# Patient Record
Sex: FEMALE | Race: WHITE | Employment: OTHER | URBAN - NONMETROPOLITAN AREA
[De-identification: names, ages, dates, MRNs, and addresses within clinical notes are randomized per-mention and may not be internally consistent; named-entity substitution may affect disease eponyms.]

---

## 2017-02-07 ENCOUNTER — HOSPITAL ENCOUNTER (OUTPATIENT)
Dept: SLEEP CENTER | Age: 62
Discharge: HOME | End: 2017-02-07
Payer: COMMERCIAL

## 2017-02-07 PROCEDURE — 95811 POLYSOM 6/>YRS CPAP 4/> PARM: CPT

## 2017-02-07 PROCEDURE — 95811 POLYSOM 6/>YRS CPAP 4/> PARM: CPT | Performed by: PSYCHIATRY & NEUROLOGY

## 2017-02-11 DIAGNOSIS — G47.33 OBSTRUCTIVE SLEEP APNEA: ICD-10-CM

## 2017-02-11 DIAGNOSIS — R06.02 SHORTNESS OF BREATH: ICD-10-CM

## 2017-02-11 DIAGNOSIS — G47.31 CENTRAL SLEEP APNEA: Primary | ICD-10-CM

## 2017-02-16 ENCOUNTER — TELEPHONE (OUTPATIENT)
Dept: NEUROLOGY | Age: 62
End: 2017-02-16

## 2017-02-22 ENCOUNTER — HOSPITAL ENCOUNTER (OUTPATIENT)
Dept: NON INVASIVE DIAGNOSTICS | Age: 62
Discharge: HOME | End: 2017-02-22
Payer: COMMERCIAL

## 2017-02-22 DIAGNOSIS — R06.02 SHORTNESS OF BREATH: ICD-10-CM

## 2017-02-22 PROCEDURE — 93306 TTE W/DOPPLER COMPLETE: CPT

## 2017-02-24 ENCOUNTER — TELEPHONE (OUTPATIENT)
Dept: NEUROLOGY | Age: 62
End: 2017-02-24

## 2017-04-05 ENCOUNTER — TELEPHONE (OUTPATIENT)
Dept: NEUROLOGY | Age: 62
End: 2017-04-05

## 2017-05-10 ENCOUNTER — TELEPHONE (OUTPATIENT)
Dept: NEUROLOGY | Age: 62
End: 2017-05-10

## 2018-08-14 ENCOUNTER — APPOINTMENT (OUTPATIENT)
Dept: PREADMISSION TESTING | Facility: HOSPITAL | Age: 63
End: 2018-08-14

## 2018-08-16 ENCOUNTER — APPOINTMENT (OUTPATIENT)
Dept: PREADMISSION TESTING | Facility: HOSPITAL | Age: 63
End: 2018-08-16

## 2018-08-16 VITALS
DIASTOLIC BLOOD PRESSURE: 67 MMHG | WEIGHT: 223.99 LBS | HEIGHT: 64 IN | HEART RATE: 78 BPM | SYSTOLIC BLOOD PRESSURE: 134 MMHG | RESPIRATION RATE: 20 BRPM | OXYGEN SATURATION: 94 % | BODY MASS INDEX: 38.24 KG/M2

## 2018-08-16 LAB
ANION GAP SERPL CALCULATED.3IONS-SCNC: 9 MMOL/L (ref 4–13)
BUN BLD-MCNC: 14 MG/DL (ref 5–21)
BUN/CREAT SERPL: 21.5 (ref 7–25)
CALCIUM SPEC-SCNC: 9.2 MG/DL (ref 8.4–10.4)
CHLORIDE SERPL-SCNC: 102 MMOL/L (ref 98–110)
CO2 SERPL-SCNC: 29 MMOL/L (ref 24–31)
CREAT BLD-MCNC: 0.65 MG/DL (ref 0.5–1.4)
DEPRECATED RDW RBC AUTO: 40.2 FL (ref 40–54)
ERYTHROCYTE [DISTWIDTH] IN BLOOD BY AUTOMATED COUNT: 13.3 % (ref 12–15)
GFR SERPL CREATININE-BSD FRML MDRD: 92 ML/MIN/1.73
GLUCOSE BLD-MCNC: 161 MG/DL (ref 70–100)
HCT VFR BLD AUTO: 39 % (ref 37–47)
HGB BLD-MCNC: 12.4 G/DL (ref 12–16)
MCH RBC QN AUTO: 26.4 PG (ref 28–32)
MCHC RBC AUTO-ENTMCNC: 31.8 G/DL (ref 33–36)
MCV RBC AUTO: 83.2 FL (ref 82–98)
PLATELET # BLD AUTO: 234 10*3/MM3 (ref 130–400)
PMV BLD AUTO: 9.2 FL (ref 6–12)
POTASSIUM BLD-SCNC: 3.5 MMOL/L (ref 3.5–5.3)
RBC # BLD AUTO: 4.69 10*6/MM3 (ref 4.2–5.4)
SODIUM BLD-SCNC: 140 MMOL/L (ref 135–145)
WBC NRBC COR # BLD: 8.24 10*3/MM3 (ref 4.8–10.8)

## 2018-08-16 PROCEDURE — 36415 COLL VENOUS BLD VENIPUNCTURE: CPT

## 2018-08-16 PROCEDURE — 93010 ELECTROCARDIOGRAM REPORT: CPT | Performed by: INTERNAL MEDICINE

## 2018-08-16 PROCEDURE — 93005 ELECTROCARDIOGRAM TRACING: CPT

## 2018-08-16 PROCEDURE — 85027 COMPLETE CBC AUTOMATED: CPT | Performed by: PODIATRIST

## 2018-08-16 PROCEDURE — 80048 BASIC METABOLIC PNL TOTAL CA: CPT | Performed by: PODIATRIST

## 2018-08-16 RX ORDER — ASPIRIN 325 MG
325 TABLET ORAL DAILY
COMMUNITY
End: 2021-04-14

## 2018-08-16 RX ORDER — ERGOCALCIFEROL 1.25 MG/1
50000 CAPSULE ORAL WEEKLY
COMMUNITY
End: 2019-09-30

## 2018-08-16 NOTE — DISCHARGE INSTRUCTIONS
DAY OF SURGERY INSTRUCTIONS        YOUR SURGEON: Senthil Curtis    PROCEDURE: 2nd and 3rd Tarsometatarsal Joint Injection with Fluroscopic Guidance Bilateral Feet     DATE OF SURGERY: August 21, 2018     ARRIVAL TIME: AS DIRECTED BY OFFICE    DAY OF SURGERY TAKE ONLY THESE MEDICATIONS UNLESS OTHERWISE INSTRUCTED BY YOUR PHYSICIAN: Metoprolol         MANAGING PAIN AFTER SURGERY    We know you are probably wondering what your pain will be like after surgery.  Following surgery it is unrealistic to expect you will not have pain.   Pain is how our bodies let us know that something is wrong or cautions us to be careful.  That said, our goal is to make your pain tolerable.    Methods we may use to treat your pain include (oral or IV medications, PCAs, epidurals, nerve blocks, etc.)   While some procedures require IV pain medications for a short time after surgery, transitioning to pain medications by mouth allows for better management of pain.   Your nurse will encourage you to take oral pain medications whenever possible.  IV medications work almost immediately, but only last a short while.  Taking medications by mouth allows for a more constant level of medication in your blood stream for a longer period of time.      Once your pain is out of control it is harder to get back under control.  It is important you are aware when your next dose of pain medication is due.  If you are admitted, your nurse may write the time of your next dose on the white board in your room to help you remember.      We are interested in your pain and encourage you to inform us about aggravating factors during your visit.   Many times a simple repositioning every few hours can make a big difference.    If your physician says it is okay, do not let your pain prevent you from getting out of bed. Be sure to call your nurse for assistance prior to getting up so you do not fall.      Before surgery, please decide your tolerable pain goal.  These  faces help describe the pain ratings we use on a 0-10 scale.   Be prepared to tell us your goal and whether or not you take pain or anxiety medications at home.          BEFORE YOU COME TO THE HOSPITAL  (Pre-op instructions)  • Do not eat, drink, smoke or chew gum after midnight the night before surgery.  This also includes no mints.  • Morning of surgery take only the medicines you have been instructed with a sip of water unless otherwise instructed  by your physician.  • Do not shave, wear makeup or dark nail polish.  • Remove all jewelry including rings.  • Leave anything you consider valuable at home.  • Leave your suitcase in the car until after your surgery.  • Bring the following with you if applicable:  o Picture ID and insurance, Medicare or Medicaid cards  o Co-pay/deductible required by insurance (cash, check, credit card)  o Copy of advance directive, living will or power-of- documents if not brought to PAT  o CPAP or BIPAP mask and tubing  o Relaxation aids (MP3 player, book, magazine)  • On the day of surgery check in at registration located at the main entrance of the hospital.       Outpatient Surgery Guidelines, Adult  Outpatient procedures are those for which the person having the procedure is allowed to go home the same day as the procedure. Various procedures are done on an outpatient basis. You should follow some general guidelines if you will be having an outpatient procedure.  LET YOUR HEALTH CARE PROVIDER KNOW ABOUT:  · Any allergies you have.  · All medicines you are taking, including vitamins, herbs, eye drops, creams, and over-the-counter medicines.  · Previous problems you or members of your family have had with the use of anesthetics.  · Any blood disorders you have.  · Previous surgeries you have had.  · Medical conditions you have.  RISKS AND COMPLICATIONS  Your health care provider will discuss possible risks and complications with you before surgery. Common risks and  complications include:    · Problems due to the use of anesthetics.  · Blood loss and replacement (does not apply to minor surgical procedures).  · Temporary increase in pain due to surgery.  · Uncorrected pain or problems that the surgery was meant to correct.  · Infection.  · New damage.  BEFORE THE PROCEDURE  · Ask your health care provider about changing or stopping your regular medicines. You may need to stop taking certain medicines in the days or weeks before the procedure.  · Stop smoking at least 2 weeks before surgery. This lowers your risk for complications during and after surgery. Ask your health care provider for help with this if needed.  · Eat your usual meals and a light supper the day before surgery. Continue fluid intake. Do not drink alcohol.  · Do not eat or drink after midnight the night before your surgery.   · Arrange for someone to take you home and to stay with you for 24 hours after the procedure. Medicine given for your procedure may affect your ability to drive or to care for yourself.  · Call your health care provider's office if you develop an illness or problem that may prevent you from safely having your procedure.  AFTER THE PROCEDURE  After surgery, you will be taken to a recovery area, where your progress will be monitored. If there are no complications, you will be allowed to go home when you are awake, stable, and taking fluids well. You may have numbness around the surgical site. Healing will take some time. You will have tenderness at the surgical site and may have some swelling and bruising. You may also have some nausea.  HOME CARE INSTRUCTIONS  · Do not drive for 24 hours, or as directed by your health care provider. Do not drive while taking prescription pain medicines.  · Do not drink alcohol for 24 hours.  · Do not make important decisions or sign legal documents for 24 hours.  · You may resume a normal diet and activities as directed.  · Do not lift anything heavier  than 10 pounds (4.5 kg) or play contact sports until your health care provider says it is okay.  · Change your bandages (dressings) as directed.  · Only take over-the-counter or prescription medicines as directed by your health care provider.  · Follow up with your health care provider as directed.  SEEK MEDICAL CARE IF:  · You have increased bleeding (more than a small spot) from the surgical site.  · You have redness, swelling, or increasing pain in the wound.  · You see pus coming from the wound.  · You have a fever.  · You notice a bad smell coming from the wound or dressing.  · You feel lightheaded or faint.  · You develop a rash.  · You have trouble breathing.  · You develop allergies.  MAKE SURE YOU:  · Understand these instructions.  · Will watch your condition.  · Will get help right away if you are not doing well or get worse.     This information is not intended to replace advice given to you by your health care provider. Make sure you discuss any questions you have with your health care provider.     Document Released: 09/12/2002 Document Revised: 05/03/2016 Document Reviewed: 05/22/2014  [facility] [facility] Interactive Patient Education ©2016 [employer]       Fall Prevention in Hospitals, Adult  As a hospital patient, your condition and the treatments you receive can increase your risk for falls. Some additional risk factors for falls in a hospital include:  · Being in an unfamiliar environment.  · Being on bed rest.  · Your surgery.  · Taking certain medicines.  · Your tubing requirements, such as intravenous (IV) therapy or catheters.  It is important that you learn how to decrease fall risks while at the hospital. Below are important tips that can help prevent falls.  SAFETY TIPS FOR PREVENTING FALLS  Talk about your risk of falling.  · Ask your health care provider why you are at risk for falling. Is it your medicine, illness, tubing placement, or something else?  · Make a plan with your health care  provider to keep you safe from falls.  · Ask your health care provider or pharmacist about side effects of your medicines. Some medicines can make you dizzy or affect your coordination.  Ask for help.  · Ask for help before getting out of bed. You may need to press your call button.  · Ask for assistance in getting safely to the toilet.  · Ask for a walker or cane to be put at your bedside. Ask that most of the side rails on your bed be placed up before your health care provider leaves the room.  · Ask family or friends to sit with you.  · Ask for things that are out of your reach, such as your glasses, hearing aids, telephone, bedside table, or call button.  Follow these tips to avoid falling:  · Stay lying or seated, rather than standing, while waiting for help.  · Wear rubber-soled slippers or shoes whenever you walk in the hospital.  · Avoid quick, sudden movements.  ¨ Change positions slowly.  ¨ Sit on the side of your bed before standing.  ¨ Stand up slowly and wait before you start to walk.  · Let your health care provider know if there is a spill on the floor.  · Pay careful attention to the medical equipment, electrical cords, and tubes around you.  · When you need help, use your call button by your bed or in the bathroom. Wait for one of your health care providers to help you.  · If you feel dizzy or unsure of your footing, return to bed and wait for assistance.  · Avoid being distracted by the TV, telephone, or another person in your room.  · Do not lean or support yourself on rolling objects, such as IV poles or bedside tables.     This information is not intended to replace advice given to you by your health care provider. Make sure you discuss any questions you have with your health care provider.     Document Released: 12/15/2001 Document Revised: 01/08/2016 Document Reviewed: 08/25/2013  [employer] Interactive Patient Education ©2016 [employer]       Surgical Site Infections FAQs  What is a Surgical  Site Infection (SSI)?  A surgical site infection is an infection that occurs after surgery in the part of the body where the surgery took place. Most patients who have surgery do not develop an infection. However, infections develop in about 1 to 3 out of every 100 patients who have surgery.  Some of the common symptoms of a surgical site infection are:  · Redness and pain around the area where you had surgery  · Drainage of cloudy fluid from your surgical wound  · Fever  Can SSIs be treated?  Yes. Most surgical site infections can be treated with antibiotics. The antibiotic given to you depends on the bacteria (germs) causing the infection. Sometimes patients with SSIs also need another surgery to treat the infection.  What are some of the things that hospitals are doing to prevent SSIs?  To prevent SSIs, doctors, nurses, and other healthcare providers:  · Clean their hands and arms up to their elbows with an antiseptic agent just before the surgery.  · Clean their hands with soap and water or an alcohol-based hand rub before and after caring for each patient.  · May remove some of your hair immediately before your surgery using electric clippers if the hair is in the same area where the procedure will occur. They should not shave you with a razor.  · Wear special hair covers, masks, gowns, and gloves during surgery to keep the surgery area clean.  · Give you antibiotics before your surgery starts. In most cases, you should get antibiotics within 60 minutes before the surgery starts and the antibiotics should be stopped within 24 hours after surgery.  · Clean the skin at the site of your surgery with a special soap that kills germs.  What can I do to help prevent SSIs?  Before your surgery:  · Tell your doctor about other medical problems you may have. Health problems such as allergies, diabetes, and obesity could affect your surgery and your treatment.  · Quit smoking. Patients who smoke get more infections. Talk  to your doctor about how you can quit before your surgery.  · Do not shave near where you will have surgery. Shaving with a razor can irritate your skin and make it easier to develop an infection.  At the time of your surgery:  · Speak up if someone tries to shave you with a razor before surgery. Ask why you need to be shaved and talk with your surgeon if you have any concerns.  · Ask if you will get antibiotics before surgery.  After your surgery:  · Make sure that your healthcare providers clean their hands before examining you, either with soap and water or an alcohol-based hand rub.  · If you do not see your providers clean their hands, please ask them to do so.  · Family and friends who visit you should not touch the surgical wound or dressings.  · Family and friends should clean their hands with soap and water or an alcohol-based hand rub before and after visiting you. If you do not see them clean their hands, ask them to clean their hands.  What do I need to do when I go home from the hospital?  · Before you go home, your doctor or nurse should explain everything you need to know about taking care of your wound. Make sure you understand how to care for your wound before you leave the hospital.  · Always clean your hands before and after caring for your wound.  · Before you go home, make sure you know who to contact if you have questions or problems after you get home.  · If you have any symptoms of an infection, such as redness and pain at the surgery site, drainage, or fever, call your doctor immediately.  If you have additional questions, please ask your doctor or nurse.  Developed and co-sponsored by The Society for Healthcare Epidemiology of [place] (SHEA); Infectious Diseases Society of [place] (IDSA); American Hospital Association; Association for Professionals in Infection Control and Epidemiology (APIC); Centers for Disease Control and Prevention (CDC); and The Joint Commission.     This information  is not intended to replace advice given to you by your health care provider. Make sure you discuss any questions you have with your health care provider.     Document Released: 12/23/2014 Document Revised: 01/08/2016 Document Reviewed: 03/02/2016  [employer] Interactive Patient Education ©2016 [employer].       [facility]  CHG 4% Patient Instruction Sheet    Preparing the Skin Before Surgery  Preparing or “prepping” skin before surgery can reduce the risk of infection at the surgical site. To make the process easier,[facility] has chosen 4% Chlorhexidine Gluconate (CHG) antiseptic solution.   The steps below outline the prepping process and should be carefully followed.                                                                                                                                                      Prep the skin at the following time(s):                                                      We recommend you shower the night before surgery, and again the morning of surgery with the 4% CHG antiseptic solution using half of the bottle and a cloth each time.  Dress in clean clothes/sleepwear after showering.  See instructions below for application.          Do not apply any lotions or moisturizers.       Do not shave the area to be prepped for at least 2 days prior to surgery.    Clipping the hair may be done immediately prior to your surgery at the hospital    if needed.    Directions:  Thoroughly rinse your body with water.  Apply 4% CHG to a cloth and wash skin gently, paying special attention to the operative site.  Rinse again thoroughly.  Once you have begun using this product do not apply anything else to your skin. If itching or redness persists, rinse affected areas and discontinue use.    When using this product:  • Keep out of eyes, ears, and mouth.  • If solution should contact these areas, rinse out promptly and thoroughly with water.  • For external use only.  • Do not use in genital  area, on your face or head.      PATIENT/FAMILY/RESPONSIBLE PARTY VERBALIZES UNDERSTANDING OF ABOVE EDUCATION.  COPY OF PAIN SCALE GIVEN AND REVIEWED WITH VERBALIZED UNDERSTANDING.

## 2018-08-21 ENCOUNTER — ANESTHESIA (OUTPATIENT)
Dept: PERIOP | Facility: HOSPITAL | Age: 63
End: 2018-08-21

## 2018-08-21 ENCOUNTER — HOSPITAL ENCOUNTER (OUTPATIENT)
Facility: HOSPITAL | Age: 63
Setting detail: HOSPITAL OUTPATIENT SURGERY
Discharge: HOME | End: 2018-08-21
Attending: PODIATRIST | Admitting: PODIATRIST

## 2018-08-21 ENCOUNTER — APPOINTMENT (OUTPATIENT)
Dept: GENERAL RADIOLOGY | Facility: HOSPITAL | Age: 63
End: 2018-08-21

## 2018-08-21 ENCOUNTER — ANESTHESIA EVENT (OUTPATIENT)
Dept: PERIOP | Facility: HOSPITAL | Age: 63
End: 2018-08-21

## 2018-08-21 VITALS
HEART RATE: 55 BPM | RESPIRATION RATE: 18 BRPM | SYSTOLIC BLOOD PRESSURE: 142 MMHG | OXYGEN SATURATION: 96 % | TEMPERATURE: 97.1 F | DIASTOLIC BLOOD PRESSURE: 62 MMHG

## 2018-08-21 PROCEDURE — 73620 X-RAY EXAM OF FOOT: CPT

## 2018-08-21 PROCEDURE — 25010000002 ROPIVACAINE PER 1 MG: Performed by: PODIATRIST

## 2018-08-21 PROCEDURE — 76000 FLUOROSCOPY <1 HR PHYS/QHP: CPT

## 2018-08-21 PROCEDURE — 25010000002 MIDAZOLAM PER 1 MG: Performed by: ANESTHESIOLOGY

## 2018-08-21 PROCEDURE — 25010000002 PROPOFOL 10 MG/ML EMULSION: Performed by: NURSE ANESTHETIST, CERTIFIED REGISTERED

## 2018-08-21 RX ORDER — ACETAMINOPHEN 500 MG
1000 TABLET ORAL ONCE
Status: DISCONTINUED | OUTPATIENT
Start: 2018-08-21 | End: 2018-08-21 | Stop reason: HOSPADM

## 2018-08-21 RX ORDER — MIDAZOLAM HYDROCHLORIDE 1 MG/ML
2 INJECTION INTRAMUSCULAR; INTRAVENOUS
Status: DISCONTINUED | OUTPATIENT
Start: 2018-08-21 | End: 2018-08-21 | Stop reason: HOSPADM

## 2018-08-21 RX ORDER — SODIUM CHLORIDE 0.9 % (FLUSH) 0.9 %
1-10 SYRINGE (ML) INJECTION AS NEEDED
Status: DISCONTINUED | OUTPATIENT
Start: 2018-08-21 | End: 2018-08-21 | Stop reason: HOSPADM

## 2018-08-21 RX ORDER — ROPIVACAINE HYDROCHLORIDE 5 MG/ML
INJECTION, SOLUTION EPIDURAL; INFILTRATION; PERINEURAL AS NEEDED
Status: DISCONTINUED | OUTPATIENT
Start: 2018-08-21 | End: 2018-08-21 | Stop reason: HOSPADM

## 2018-08-21 RX ORDER — METHYLPREDNISOLONE ACETATE 80 MG/ML
INJECTION, SUSPENSION INTRA-ARTICULAR; INTRALESIONAL; INTRAMUSCULAR; SOFT TISSUE AS NEEDED
Status: DISCONTINUED | OUTPATIENT
Start: 2018-08-21 | End: 2018-08-21 | Stop reason: HOSPADM

## 2018-08-21 RX ORDER — SODIUM CHLORIDE, SODIUM LACTATE, POTASSIUM CHLORIDE, CALCIUM CHLORIDE 600; 310; 30; 20 MG/100ML; MG/100ML; MG/100ML; MG/100ML
100 INJECTION, SOLUTION INTRAVENOUS CONTINUOUS PRN
Status: DISCONTINUED | OUTPATIENT
Start: 2018-08-21 | End: 2018-08-21 | Stop reason: HOSPADM

## 2018-08-21 RX ORDER — LIDOCAINE HYDROCHLORIDE 20 MG/ML
INJECTION, SOLUTION INFILTRATION; PERINEURAL AS NEEDED
Status: DISCONTINUED | OUTPATIENT
Start: 2018-08-21 | End: 2018-08-21 | Stop reason: SURG

## 2018-08-21 RX ORDER — MIDAZOLAM HYDROCHLORIDE 1 MG/ML
1 INJECTION INTRAMUSCULAR; INTRAVENOUS
Status: DISCONTINUED | OUTPATIENT
Start: 2018-08-21 | End: 2018-08-21 | Stop reason: HOSPADM

## 2018-08-21 RX ORDER — PROPOFOL 10 MG/ML
VIAL (ML) INTRAVENOUS AS NEEDED
Status: DISCONTINUED | OUTPATIENT
Start: 2018-08-21 | End: 2018-08-21 | Stop reason: SURG

## 2018-08-21 RX ORDER — SODIUM CHLORIDE, SODIUM LACTATE, POTASSIUM CHLORIDE, CALCIUM CHLORIDE 600; 310; 30; 20 MG/100ML; MG/100ML; MG/100ML; MG/100ML
100 INJECTION, SOLUTION INTRAVENOUS CONTINUOUS
Status: DISCONTINUED | OUTPATIENT
Start: 2018-08-21 | End: 2018-08-21 | Stop reason: HOSPADM

## 2018-08-21 RX ADMIN — LIDOCAINE HYDROCHLORIDE 0.5 ML: 10 INJECTION, SOLUTION EPIDURAL; INFILTRATION; INTRACAUDAL; PERINEURAL at 06:27

## 2018-08-21 RX ADMIN — LIDOCAINE HYDROCHLORIDE 100 MG: 20 INJECTION, SOLUTION INFILTRATION; PERINEURAL at 08:10

## 2018-08-21 RX ADMIN — MIDAZOLAM 2 MG: 1 INJECTION INTRAMUSCULAR; INTRAVENOUS at 07:47

## 2018-08-21 RX ADMIN — PROPOFOL 200 MG: 10 INJECTION, EMULSION INTRAVENOUS at 08:10

## 2018-08-21 RX ADMIN — SODIUM CHLORIDE, POTASSIUM CHLORIDE, SODIUM LACTATE AND CALCIUM CHLORIDE 100 ML/HR: 600; 310; 30; 20 INJECTION, SOLUTION INTRAVENOUS at 06:27

## 2018-08-21 NOTE — DISCHARGE INSTRUCTIONS
YOUR NEXT PAIN MEDICATION IS DUE AT______________        Moderate Conscious Sedation, Adult, Care After  Refer to this sheet in the next few weeks. These instructions provide you with information on caring for yourself after your procedure. Your health care provider may also give you more specific instructions. Your treatment has been planned according to current medical practices, but problems sometimes occur. Call your health care provider if you have any problems or questions after your procedure.  WHAT TO EXPECT AFTER THE PROCEDURE    After your procedure:  · You may feel sleepy, clumsy, and have poor balance for several hours.  · Vomiting may occur if you eat too soon after the procedure.  HOME CARE INSTRUCTIONS  · Do not participate in any activities where you could become injured for at least 24 hours. Do not:  ¨ Drive.  ¨ Swim.  ¨ Ride a bicycle.  ¨ Operate heavy machinery.  ¨ Cook.  ¨ Use power tools.  ¨ Climb ladders.  ¨ Work from a high place.  · Do not make important decisions or sign legal documents until you are improved.  · If you vomit, drink water, juice, or soup when you can drink without vomiting. Make sure you have little or no nausea before eating solid foods.  · Only take over-the-counter or prescription medicines for pain, discomfort, or fever as directed by your health care provider.  · Make sure you and your family fully understand everything about the medicines given to you, including what side effects may occur.  · You should not drink alcohol, take sleeping pills, or take medicines that cause drowsiness for at least 24 hours.  · If you smoke, do not smoke without supervision.  · If you are feeling better, you may resume normal activities 24 hours after you were sedated.  · Keep all appointments with your health care provider.  SEEK MEDICAL CARE IF:  · Your skin is pale or bluish in color.  · You continue to feel nauseous or vomit.  · Your pain is getting worse and is not helped by  medicine.  · You have bleeding or swelling.  · You are still sleepy or feeling clumsy after 24 hours.  SEEK IMMEDIATE MEDICAL CARE IF:  · You develop a rash.  · You have difficulty breathing.  · You develop any type of allergic problem.  · You have a fever.  MAKE SURE YOU:  · Understand these instructions.  · Will watch your condition.  · Will get help right away if you are not doing well or get worse.     This information is not intended to replace advice given to you by your health care provider. Make sure you discuss any questions you have with your health care provider.     Document Released: 10/08/2014 Document Revised: 01/08/2016 Document Reviewed: 10/08/2014  [employer] Interactive Patient Education ©2016 [employer]         CALL YOUR PHYSICIAN IF YOU EXPERIENCE  INCREASED PAIN NOT HELPED BY YOUR PAIN MEDICATION.        Fall Prevention in the Home      Falls can cause injuries. They can happen to people of all ages. There are many things you can do to make your home safe and to help prevent falls.    WHAT CAN I DO ON THE OUTSIDE OF MY HOME?  · Regularly fix the edges of walkways and driveways and fix any cracks.  · Remove anything that might make you trip as you walk through a door, such as a raised step or threshold.  · Trim any bushes or trees on the path to your home.  · Use bright outdoor lighting.  · Clear any walking paths of anything that might make someone trip, such as rocks or tools.  · Regularly check to see if handrails are loose or broken. Make sure that both sides of any steps have handrails.  · Any raised decks and porches should have guardrails on the edges.  · Have any leaves, snow, or ice cleared regularly.  · Use sand or salt on walking paths during winter.  · Clean up any spills in your garage right away. This includes oil or grease spills.  WHAT CAN I DO IN THE BATHROOM?    · Use night lights.  · Install grab bars by the toilet and in the tub and shower. Do not use towel bars as grab  bars.  · Use non-skid mats or decals in the tub or shower.  · If you need to sit down in the shower, use a plastic, non-slip stool.  · Keep the floor dry. Clean up any water that spills on the floor as soon as it happens.  · Remove soap buildup in the tub or shower regularly.  · Attach bath mats securely with double-sided non-slip rug tape.  · Do not have throw rugs and other things on the floor that can make you trip.  WHAT CAN I DO IN THE BEDROOM?  · Use night lights.  · Make sure that you have a light by your bed that is easy to reach.  · Do not use any sheets or blankets that are too big for your bed. They should not hang down onto the floor.  · Have a firm chair that has side arms. You can use this for support while you get dressed.  · Do not have throw rugs and other things on the floor that can make you trip.  WHAT CAN I DO IN THE KITCHEN?  · Clean up any spills right away.  · Avoid walking on wet floors.  · Keep items that you use a lot in easy-to-reach places.  · If you need to reach something above you, use a strong step stool that has a grab bar.  · Keep electrical cords out of the way.  · Do not use floor polish or wax that makes floors slippery. If you must use wax, use non-skid floor wax.  · Do not have throw rugs and other things on the floor that can make you trip.  WHAT CAN I DO WITH MY STAIRS?  · Do not leave any items on the stairs.  · Make sure that there are handrails on both sides of the stairs and use them. Fix handrails that are broken or loose. Make sure that handrails are as long as the stairways.  · Check any carpeting to make sure that it is firmly attached to the stairs. Fix any carpet that is loose or worn.  · Avoid having throw rugs at the top or bottom of the stairs. If you do have throw rugs, attach them to the floor with carpet tape.  · Make sure that you have a light switch at the top of the stairs and the bottom of the stairs. If you do not have them, ask someone to add them for  you.  WHAT ELSE CAN I DO TO HELP PREVENT FALLS?  · Wear shoes that:  ¨ Do not have high heels.  ¨ Have rubber bottoms.  ¨ Are comfortable and fit you well.  ¨ Are closed at the toe. Do not wear sandals.  · If you use a stepladder:  ¨ Make sure that it is fully opened. Do not climb a closed stepladder.  ¨ Make sure that both sides of the stepladder are locked into place.  ¨ Ask someone to hold it for you, if possible.  · Clearly sirisha and make sure that you can see:  ¨ Any grab bars or handrails.  ¨ First and last steps.  ¨ Where the edge of each step is.  · Use tools that help you move around (mobility aids) if they are needed. These include:  ¨ Canes.  ¨ Walkers.  ¨ Scooters.  ¨ Crutches.  · Turn on the lights when you go into a dark area. Replace any light bulbs as soon as they burn out.  · Set up your furniture so you have a clear path. Avoid moving your furniture around.  · If any of your floors are uneven, fix them.  · If there are any pets around you, be aware of where they are.  · Review your medicines with your doctor. Some medicines can make you feel dizzy. This can increase your chance of falling.  Ask your doctor what other things that you can do to help prevent falls.     This information is not intended to replace advice given to you by your health care provider. Make sure you discuss any questions you have with your health care provider.     Document Released: 10/14/2010 Document Revised: 05/03/2016 Document Reviewed: 01/22/2016  [employer] Interactive Patient Education ©2016 [employer]     PATIENT/FAMILY/RESPONSIBLE PARTY VERBALIZES UNDERSTANDING OF ABOVE EDUCATION.  COPY OF PAIN SCALE GIVEN AND REVIEWED WITH VERBALIZED UNDERSTANDING.

## 2018-08-21 NOTE — ANESTHESIA POSTPROCEDURE EVALUATION
Patient: Sada Casey    Procedure Summary     Date:  08/21/18 Room / Location:  [facility] OR [address] [place] [address] PAD OR    Anesthesia Start:  0806 Anesthesia Stop:  0824    Procedure:  2, 3 TARSOMETATARSAL JOINT INJECTION WITH FLUROSCOPIC GUIDANCE - BILATERAL FEET (Bilateral ) Diagnosis:       Primary osteoarthritis of foot      Pain, foot      (OSTEOARTHRITIS MIDFOOT - BILATERAL FEET)    Surgeon:  Senthil Curtis DPM Provider:  Ministerio Tate CRNA    Anesthesia Type:  MAC ASA Status:  2          Anesthesia Type: MAC  Last vitals  BP   138/62 (08/21/18 0851)   Temp   97.1 °F (36.2 °C) (08/21/18 0823)   Pulse   59 (08/21/18 0851)   Resp   18 (08/21/18 0851)     SpO2   100 % (08/21/18 0851)     Post Anesthesia Care and Evaluation    Patient location during evaluation: PACU  Patient participation: complete - patient participated  Level of consciousness: awake and alert  Pain score: 0  Pain management: adequate  Airway patency: patent  Anesthetic complications: No anesthetic complications  PONV Status: none  Cardiovascular status: acceptable  Respiratory status: acceptable  Hydration status: acceptable    Comments: Blood pressure 138/62, pulse 59, temperature 97.1 °F (36.2 °C), temperature source Temporal Artery , resp. rate 18, SpO2 100 %.    Pt discharged from PACU based on dilia score >8

## 2018-08-21 NOTE — ANESTHESIA POSTPROCEDURE EVALUATION
Patient: Sada Casey    Procedure Summary     Date:  08/21/18 Room / Location:  [facility] OR [address] [place] [address] PAD OR    Anesthesia Start:  0806 Anesthesia Stop:      Procedure:  2, 3 TARSOMETATARSAL JOINT INJECTION WITH FLUROSCOPIC GUIDANCE - BILATERAL FEET (Bilateral ) Diagnosis:       Primary osteoarthritis of foot      Pain, foot      (OSTEOARTHRITIS MIDFOOT - BILATERAL FEET)    Surgeon:  Senthil Curtis DPM Provider:  Ministerio Tate CRNA    Anesthesia Type:  MAC ASA Status:  2          Anesthesia Type: MAC  Last vitals  BP   134/68 (08/21/18 0615)   Temp   98.4 °F (36.9 °C) (08/21/18 0615)   Pulse   57 (08/21/18 0721)   Resp   16 (08/21/18 0615)     SpO2   98 % (08/21/18 0721)     Post Anesthesia Care and Evaluation    Patient location during evaluation: PACU  Patient participation: complete - patient participated  Level of consciousness: awake and alert  Pain management: adequate  Airway patency: patent  Anesthetic complications: No anesthetic complications  PONV Status: none  Cardiovascular status: acceptable and hemodynamically stable  Respiratory status: acceptable  Hydration status: acceptable    Comments: Blood pressure 134/68, pulse 57, temperature 98.4 °F (36.9 °C), temperature source Temporal Artery , resp. rate 16, SpO2 98 %.    Patient discharged from PACU based upon Juan score. Please see RN notes for further details

## 2018-08-21 NOTE — ANESTHESIA PREPROCEDURE EVALUATION
Anesthesia Evaluation     Patient summary reviewed and Nursing notes reviewed   no history of anesthetic complications:  NPO Solid Status: > 8 hours  NPO Liquid Status: > 8 hours           Airway   Mallampati: III  TM distance: >3 FB  Neck ROM: full  Possible difficult intubation  Dental      Pulmonary     breath sounds clear to auscultation  (+) sleep apnea on CPAP,   (-) asthma, not a smoker  Cardiovascular   Exercise tolerance: poor (<4 METS)    Patient on routine beta blocker and Beta blocker given within 24 hours of surgery  Rhythm: regular  Rate: normal    (+) hypertension, DVT resolved, hyperlipidemia,       Neuro/Psych  (+) numbness, psychiatric history Anxiety,     (-) seizures, TIA, CVA  GI/Hepatic/Renal/Endo    (+) obesity,  GERD,    (-) liver disease, no renal disease, diabetes    Musculoskeletal     Abdominal    Substance History      OB/GYN          Other   (+) arthritis                     Anesthesia Plan    ASA 2     MAC     intravenous induction   Anesthetic plan and risks discussed with patient.

## 2018-12-06 ENCOUNTER — OFFICE VISIT (OUTPATIENT)
Dept: GASTROENTEROLOGY | Facility: CLINIC | Age: 63
End: 2018-12-06

## 2018-12-06 VITALS
HEART RATE: 64 BPM | OXYGEN SATURATION: 99 % | HEIGHT: 63 IN | BODY MASS INDEX: 39.69 KG/M2 | WEIGHT: 224 LBS | DIASTOLIC BLOOD PRESSURE: 74 MMHG | TEMPERATURE: 98.1 F | SYSTOLIC BLOOD PRESSURE: 120 MMHG

## 2018-12-06 DIAGNOSIS — Z12.11 COLON CANCER SCREENING: Primary | ICD-10-CM

## 2018-12-06 PROCEDURE — S0285 CNSLT BEFORE SCREEN COLONOSC: HCPCS | Performed by: NURSE PRACTITIONER

## 2018-12-06 RX ORDER — DULOXETIN HYDROCHLORIDE 30 MG/1
CAPSULE, DELAYED RELEASE ORAL
COMMUNITY
Start: 2018-11-15 | End: 2019-01-14

## 2018-12-06 NOTE — PROGRESS NOTES
Chief Complaint   Patient presents with   • Colon Cancer Screening     Patient is here today for colon screening.     Subjective   HPI  Sada Casey is a 63 y.o. female who presents as a referral for preventative maintenance. She has no complaints of nausea or vomiting. No change in bowels. No wt loss. No BRBPR. No melena. There is no family hx for colon cancer. No abdominal pain. There was n0 Cologuard screening this year.  The patient's last colonoscopy was performed on 12/15/11 with findings of diverticulosis only.  Past Medical History:   Diagnosis Date   • Anxiety    • Arthritis    • DVT (deep venous thrombosis) (CMS/HCC)    • Eczema    • GERD (gastroesophageal reflux disease)    • Hyperlipidemia    • Hypertension    • Right leg numbness    • Sleep apnea    • Stress incontinence      Past Surgical History:   Procedure Laterality Date   • BREAST LUMPECTOMY Left    • COLONOSCOPY  12/15/2011   • EYE SURGERY Bilateral     cataract surgery with lens implants    • HYSTERECTOMY     • UPPER GASTROINTESTINAL ENDOSCOPY  12/15/2011       Current Outpatient Medications:   •  aspirin 325 MG tablet, Take 325 mg by mouth Daily. Held 8/16/2018, Disp: , Rfl:   •  cimetidine (TAGAMET HB) 200 MG tablet, Take 200 mg by mouth 2 times daily as needed, Disp: , Rfl:   •  clotrimazole-betamethasone (LOTRISONE) 1-0.05 % cream, Apply  topically 2 (Two) Times a Day. (Patient taking differently: Apply  topically to the appropriate area as directed 2 (Two) Times a Day. As needed), Disp: 15 g, Rfl: 0  •  hydrochlorothiazide (MICROZIDE) 12.5 MG capsule, Take 12.5 mg by mouth every morning , Disp: , Rfl:   •  irbesartan (AVAPRO) 150 MG tablet, Take 150 mg by mouth daily , Disp: , Rfl:   •  metoprolol succinate XL (TOPROL-XL) 100 MG 24 hr tablet, Take 100 mg by mouth daily , Disp: , Rfl:   •  vitamin D (ERGOCALCIFEROL) 45364 units capsule capsule, Take 50,000 Units by mouth 1 (One) Time Per Week., Disp: , Rfl:   •  DULoxetine (CYMBALTA) 30  MG capsule, , Disp: , Rfl:   •  Sod Picosulfate-Mag Ox-Cit Acd (CLENPIQ) 10-3.5-12 MG-GM -GM/160ML solution, Take 1 container by mouth Take As Directed., Disp: 1 bottle, Rfl: 0  Allergies   Allergen Reactions   • Acetaminophen Rash     Social History     Socioeconomic History   • Marital status:      Spouse name: Not on file   • Number of children: Not on file   • Years of education: Not on file   • Highest education level: Not on file   Social Needs   • Financial resource strain: Not on file   • Food insecurity - worry: Not on file   • Food insecurity - inability: Not on file   • Transportation needs - medical: Not on file   • Transportation needs - non-medical: Not on file   Occupational History   • Not on file   Tobacco Use   • Smoking status: Never Smoker   • Smokeless tobacco: Never Used   Substance and Sexual Activity   • Alcohol use: Yes     Comment: occ   • Drug use: No   • Sexual activity: Defer   Other Topics Concern   • Not on file   Social History Narrative   • Not on file     Family History   Problem Relation Age of Onset   • No Known Problems Father    • Coronary artery disease Mother    • No Known Problems Sister    • No Known Problems Sister    • No Known Problems Sister    • No Known Problems Sister    • Colon cancer Neg Hx    • Colon polyps Neg Hx        REVIEW OF SYSTEMS  General: well appearing, no fever chills or sweats, no unexplained wt loss  HEENT: no acute visual or hearing disturbances  Cardiovascular: No chest pain or palpitations  Pulmonary: No shortness of breath, coughing, wheezing or hemoptysis  : No burning, urgency, hematuria, or dysuria  Musculoskeletal: No joint pain or stiffness  Peripheral: no edema  Skin: No lesions or rashes  Neuro: No dizziness, headaches, stroke, syncope  Endocrine: No hot or cold intolerances  Hematological: No blood dyscrasias    Objective   Vitals:    12/06/18 0952   BP: 120/74   Pulse: 64   Temp: 98.1 °F (36.7 °C)   SpO2: 99%   Weight: 102 kg  "(224 lb)   Height: 160 cm (63\")     Body mass index is 39.68 kg/m².    PHYSICAL EXAM  General: age appropriate well nourished well appearing, no acute distress  Head: normocephalic and atraumatic  Global assessment-supple  Neck-No JVD noted, no lymphadenopathy  Pulmonary-clear to auscultation bilaterally, normal respiratory effort  Cardiovascular-normal rate and rhythm, normal heart sounds, S1 and S2 noted  Abdomen-soft, non tender, non distended, normal bowel sounds all 4 quadrants, no hepatosplenomegaly noted  Extremities-No clubbing cyanosis or edema  Neuro-Non focal, converses appropriately, awake, alert, oriented    Imaging Results (most recent)     None        Assessment/Plan   Sada was seen today for colon cancer screening.    Diagnoses and all orders for this visit:    Colon cancer screening  -     Case Request; Standing  -     Case Request    Other orders  -     Follow Anesthesia Guidelines / Standing Orders; Future  -     Implement Anesthesia Orders Day of Procedure; Standing  -     Obtain Informed Consent; Standing  -     Verify bowel prep was successful; Standing  -     Sod Picosulfate-Mag Ox-Cit Acd (CLENPIQ) 10-3.5-12 MG-GM -GM/160ML solution; Take 1 container by mouth Take As Directed.      COLONOSCOPY WITH ANESTHESIA (N/A)       Body mass index is 39.68 kg/m². Patient's Body mass index is 39.68 kg/m². BMI is above normal parameters. Recommendations include: no follow-up required.      All risks, benefits, alternatives, and indications of colonoscopy procedure have been discussed with the patient. Risks to include perforation of the colon requiring possible surgery or colostomy, risk of bleeding from biopsies or removal of colon tissue, possibility of missing a colon polyp or cancer, or adverse drug reaction.  Benefits to include the diagnosis and management of disease of the colon and rectum. Alternatives to include barium enema, radiographic evaluation, lab testing or no intervention. Pt " verbalizes understanding and agrees.

## 2018-12-26 ENCOUNTER — TELEPHONE (OUTPATIENT)
Dept: NEUROLOGY | Age: 63
End: 2018-12-26

## 2019-01-14 ENCOUNTER — APPOINTMENT (OUTPATIENT)
Dept: PREADMISSION TESTING | Facility: HOSPITAL | Age: 64
End: 2019-01-14

## 2019-01-14 VITALS
BODY MASS INDEX: 40.9 KG/M2 | OXYGEN SATURATION: 96 % | RESPIRATION RATE: 16 BRPM | SYSTOLIC BLOOD PRESSURE: 146 MMHG | HEIGHT: 63 IN | DIASTOLIC BLOOD PRESSURE: 65 MMHG | WEIGHT: 230.82 LBS | HEART RATE: 75 BPM

## 2019-01-14 LAB
ANION GAP SERPL CALCULATED.3IONS-SCNC: 10 MMOL/L (ref 4–13)
BUN BLD-MCNC: 11 MG/DL (ref 5–21)
BUN/CREAT SERPL: 22.4 (ref 7–25)
CALCIUM SPEC-SCNC: 9.3 MG/DL (ref 8.4–10.4)
CHLORIDE SERPL-SCNC: 101 MMOL/L (ref 98–110)
CO2 SERPL-SCNC: 28 MMOL/L (ref 24–31)
CREAT BLD-MCNC: 0.49 MG/DL (ref 0.5–1.4)
DEPRECATED RDW RBC AUTO: 40.3 FL (ref 40–54)
ERYTHROCYTE [DISTWIDTH] IN BLOOD BY AUTOMATED COUNT: 13.4 % (ref 12–15)
GFR SERPL CREATININE-BSD FRML MDRD: 127 ML/MIN/1.73
GLUCOSE BLD-MCNC: 150 MG/DL (ref 70–100)
HCT VFR BLD AUTO: 41.4 % (ref 37–47)
HGB BLD-MCNC: 13.1 G/DL (ref 12–16)
MCH RBC QN AUTO: 26.3 PG (ref 28–32)
MCHC RBC AUTO-ENTMCNC: 31.6 G/DL (ref 33–36)
MCV RBC AUTO: 83.1 FL (ref 82–98)
PLATELET # BLD AUTO: 257 10*3/MM3 (ref 130–400)
PMV BLD AUTO: 9.1 FL (ref 6–12)
POTASSIUM BLD-SCNC: 4 MMOL/L (ref 3.5–5.3)
RBC # BLD AUTO: 4.98 10*6/MM3 (ref 4.2–5.4)
SODIUM BLD-SCNC: 139 MMOL/L (ref 135–145)
WBC NRBC COR # BLD: 8.78 10*3/MM3 (ref 4.8–10.8)

## 2019-01-14 PROCEDURE — 85027 COMPLETE CBC AUTOMATED: CPT | Performed by: PODIATRIST

## 2019-01-14 PROCEDURE — 80048 BASIC METABOLIC PNL TOTAL CA: CPT | Performed by: PODIATRIST

## 2019-01-14 PROCEDURE — 36415 COLL VENOUS BLD VENIPUNCTURE: CPT

## 2019-01-14 RX ORDER — IBUPROFEN AND FAMOTIDINE 26.6; 8 MG/1; MG/1
1 TABLET, FILM COATED ORAL 3 TIMES DAILY PRN
COMMUNITY
End: 2019-09-30

## 2019-01-14 NOTE — DISCHARGE INSTRUCTIONS
DAY OF SURGERY INSTRUCTIONS        YOUR SURGEON: dr yonis calle    PROCEDURE: ***2, 3, TARSOMETATARSAL JOINT INJECTION WITH FLUROSCOPIC GUIDANCE- BILATERAL FEET    DATE OF SURGERY: ***1/15/2019    ARRIVAL TIME: AS DIRECTED BY OFFICE    YOU MAY TAKE THE FOLLOWING MEDICATION(S) THE MORNING OF SURGERY WITH A SIP OF WATER: ***none                MANAGING PAIN AFTER SURGERY    We know you are probably wondering what your pain will be like after surgery.  Following surgery it is unrealistic to expect you will not have pain.   Pain is how our bodies let us know that something is wrong or cautions us to be careful.  That said, our goal is to make your pain tolerable.    Methods we may use to treat your pain include (oral or IV medications, PCAs, epidurals, nerve blocks, etc.)   While some procedures require IV pain medications for a short time after surgery, transitioning to pain medications by mouth allows for better management of pain.   Your nurse will encourage you to take oral pain medications whenever possible.  IV medications work almost immediately, but only last a short while.  Taking medications by mouth allows for a more constant level of medication in your blood stream for a longer period of time.      Once your pain is out of control it is harder to get back under control.  It is important you are aware when your next dose of pain medication is due.  If you are admitted, your nurse may write the time of your next dose on the white board in your room to help you remember.      We are interested in your pain and encourage you to inform us about aggravating factors during your visit.   Many times a simple repositioning every few hours can make a big difference.    If your physician says it is okay, do not let your pain prevent you from getting out of bed. Be sure to call your nurse for assistance prior to getting up so you do not fall.      Before surgery, please decide your tolerable pain goal.  These faces  help describe the pain ratings we use on a 0-10 scale.   Be prepared to tell us your goal and whether or not you take pain or anxiety medications at home.          BEFORE YOU COME TO THE HOSPITAL  (Pre-op instructions)  • Do not eat, drink, smoke or chew gum after midnight the night before surgery.  This also includes no mints.  • Morning of surgery take only the medicines you have been instructed with a sip of water unless otherwise instructed  by your physician.  • Do not shave, wear makeup or dark nail polish.  • Remove all jewelry including rings.  • Leave anything you consider valuable at home.  • Leave your suitcase in the car until after your surgery.  • Bring the following with you if applicable:  o Picture ID and insurance, Medicare or Medicaid cards  o Co-pay/deductible required by insurance (cash, check, credit card)  o Copy of advance directive, living will or power-of- documents if not brought to PAT  o CPAP or BIPAP mask and tubing  o Relaxation aids (MP3 player, book, magazine)  • On the day of surgery check in at registration located at the main entrance of the hospital.       Outpatient Surgery Guidelines, Adult  Outpatient procedures are those for which the person having the procedure is allowed to go home the same day as the procedure. Various procedures are done on an outpatient basis. You should follow some general guidelines if you will be having an outpatient procedure.  LET YOUR HEALTH CARE PROVIDER KNOW ABOUT:  · Any allergies you have.  · All medicines you are taking, including vitamins, herbs, eye drops, creams, and over-the-counter medicines.  · Previous problems you or members of your family have had with the use of anesthetics.  · Any blood disorders you have.  · Previous surgeries you have had.  · Medical conditions you have.  RISKS AND COMPLICATIONS  Your health care provider will discuss possible risks and complications with you before surgery. Common risks and complications  include:    · Problems due to the use of anesthetics.  · Blood loss and replacement (does not apply to minor surgical procedures).  · Temporary increase in pain due to surgery.  · Uncorrected pain or problems that the surgery was meant to correct.  · Infection.  · New damage.  BEFORE THE PROCEDURE  · Ask your health care provider about changing or stopping your regular medicines. You may need to stop taking certain medicines in the days or weeks before the procedure.  · Stop smoking at least 2 weeks before surgery. This lowers your risk for complications during and after surgery. Ask your health care provider for help with this if needed.  · Eat your usual meals and a light supper the day before surgery. Continue fluid intake. Do not drink alcohol.  · Do not eat or drink after midnight the night before your surgery.   · Arrange for someone to take you home and to stay with you for 24 hours after the procedure. Medicine given for your procedure may affect your ability to drive or to care for yourself.  · Call your health care provider's office if you develop an illness or problem that may prevent you from safely having your procedure.  AFTER THE PROCEDURE  After surgery, you will be taken to a recovery area, where your progress will be monitored. If there are no complications, you will be allowed to go home when you are awake, stable, and taking fluids well. You may have numbness around the surgical site. Healing will take some time. You will have tenderness at the surgical site and may have some swelling and bruising. You may also have some nausea.  HOME CARE INSTRUCTIONS  · Do not drive for 24 hours, or as directed by your health care provider. Do not drive while taking prescription pain medicines.  · Do not drink alcohol for 24 hours.  · Do not make important decisions or sign legal documents for 24 hours.  · You may resume a normal diet and activities as directed.  · Do not lift anything heavier than 10 pounds  (4.5 kg) or play contact sports until your health care provider says it is okay.  · Change your bandages (dressings) as directed.  · Only take over-the-counter or prescription medicines as directed by your health care provider.  · Follow up with your health care provider as directed.  SEEK MEDICAL CARE IF:  · You have increased bleeding (more than a small spot) from the surgical site.  · You have redness, swelling, or increasing pain in the wound.  · You see pus coming from the wound.  · You have a fever.  · You notice a bad smell coming from the wound or dressing.  · You feel lightheaded or faint.  · You develop a rash.  · You have trouble breathing.  · You develop allergies.  MAKE SURE YOU:  · Understand these instructions.  · Will watch your condition.  · Will get help right away if you are not doing well or get worse.     This information is not intended to replace advice given to you by your health care provider. Make sure you discuss any questions you have with your health care provider.     Document Released: 09/12/2002 Document Revised: 05/03/2016 Document Reviewed: 05/22/2014  [employer] Interactive Patient Education ©2016 [employer]       Fall Prevention in Hospitals, Adult  As a hospital patient, your condition and the treatments you receive can increase your risk for falls. Some additional risk factors for falls in a hospital include:  · Being in an unfamiliar environment.  · Being on bed rest.  · Your surgery.  · Taking certain medicines.  · Your tubing requirements, such as intravenous (IV) therapy or catheters.  It is important that you learn how to decrease fall risks while at the hospital. Below are important tips that can help prevent falls.  SAFETY TIPS FOR PREVENTING FALLS  Talk about your risk of falling.  · Ask your health care provider why you are at risk for falling. Is it your medicine, illness, tubing placement, or something else?  · Make a plan with your health care provider to keep you  safe from falls.  · Ask your health care provider or pharmacist about side effects of your medicines. Some medicines can make you dizzy or affect your coordination.  Ask for help.  · Ask for help before getting out of bed. You may need to press your call button.  · Ask for assistance in getting safely to the toilet.  · Ask for a walker or cane to be put at your bedside. Ask that most of the side rails on your bed be placed up before your health care provider leaves the room.  · Ask family or friends to sit with you.  · Ask for things that are out of your reach, such as your glasses, hearing aids, telephone, bedside table, or call button.  Follow these tips to avoid falling:  · Stay lying or seated, rather than standing, while waiting for help.  · Wear rubber-soled slippers or shoes whenever you walk in the hospital.  · Avoid quick, sudden movements.  ¨ Change positions slowly.  ¨ Sit on the side of your bed before standing.  ¨ Stand up slowly and wait before you start to walk.  · Let your health care provider know if there is a spill on the floor.  · Pay careful attention to the medical equipment, electrical cords, and tubes around you.  · When you need help, use your call button by your bed or in the bathroom. Wait for one of your health care providers to help you.  · If you feel dizzy or unsure of your footing, return to bed and wait for assistance.  · Avoid being distracted by the TV, telephone, or another person in your room.  · Do not lean or support yourself on rolling objects, such as IV poles or bedside tables.     This information is not intended to replace advice given to you by your health care provider. Make sure you discuss any questions you have with your health care provider.     Document Released: 12/15/2001 Document Revised: 01/08/2016 Document Reviewed: 08/25/2013  [employer] Interactive Patient Education ©2016 [employer]       Surgical Site Infections FAQs  What is a Surgical Site Infection  (SSI)?  A surgical site infection is an infection that occurs after surgery in the part of the body where the surgery took place. Most patients who have surgery do not develop an infection. However, infections develop in about 1 to 3 out of every 100 patients who have surgery.  Some of the common symptoms of a surgical site infection are:  · Redness and pain around the area where you had surgery  · Drainage of cloudy fluid from your surgical wound  · Fever  Can SSIs be treated?  Yes. Most surgical site infections can be treated with antibiotics. The antibiotic given to you depends on the bacteria (germs) causing the infection. Sometimes patients with SSIs also need another surgery to treat the infection.  What are some of the things that hospitals are doing to prevent SSIs?  To prevent SSIs, doctors, nurses, and other healthcare providers:  · Clean their hands and arms up to their elbows with an antiseptic agent just before the surgery.  · Clean their hands with soap and water or an alcohol-based hand rub before and after caring for each patient.  · May remove some of your hair immediately before your surgery using electric clippers if the hair is in the same area where the procedure will occur. They should not shave you with a razor.  · Wear special hair covers, masks, gowns, and gloves during surgery to keep the surgery area clean.  · Give you antibiotics before your surgery starts. In most cases, you should get antibiotics within 60 minutes before the surgery starts and the antibiotics should be stopped within 24 hours after surgery.  · Clean the skin at the site of your surgery with a special soap that kills germs.  What can I do to help prevent SSIs?  Before your surgery:  · Tell your doctor about other medical problems you may have. Health problems such as allergies, diabetes, and obesity could affect your surgery and your treatment.  · Quit smoking. Patients who smoke get more infections. Talk to your doctor  about how you can quit before your surgery.  · Do not shave near where you will have surgery. Shaving with a razor can irritate your skin and make it easier to develop an infection.  At the time of your surgery:  · Speak up if someone tries to shave you with a razor before surgery. Ask why you need to be shaved and talk with your surgeon if you have any concerns.  · Ask if you will get antibiotics before surgery.  After your surgery:  · Make sure that your healthcare providers clean their hands before examining you, either with soap and water or an alcohol-based hand rub.  · If you do not see your providers clean their hands, please ask them to do so.  · Family and friends who visit you should not touch the surgical wound or dressings.  · Family and friends should clean their hands with soap and water or an alcohol-based hand rub before and after visiting you. If you do not see them clean their hands, ask them to clean their hands.  What do I need to do when I go home from the hospital?  · Before you go home, your doctor or nurse should explain everything you need to know about taking care of your wound. Make sure you understand how to care for your wound before you leave the hospital.  · Always clean your hands before and after caring for your wound.  · Before you go home, make sure you know who to contact if you have questions or problems after you get home.  · If you have any symptoms of an infection, such as redness and pain at the surgery site, drainage, or fever, call your doctor immediately.  If you have additional questions, please ask your doctor or nurse.  Developed and co-sponsored by The Society for Healthcare Epidemiology of [place] (SHEA); Infectious Diseases Society of [place] (IDSA); American Hospital Association; Association for Professionals in Infection Control and Epidemiology (APIC); Centers for Disease Control and Prevention (CDC); and The Joint Commission.     This information is not intended  to replace advice given to you by your health care provider. Make sure you discuss any questions you have with your health care provider.     Document Released: 12/23/2014 Document Revised: 01/08/2016 Document Reviewed: 03/02/2016  [employer] Interactive Patient Education ©2016 [employer] Inc.       [facility]  CHG 4% Patient Instruction Sheet    Preparing the Skin Before Surgery  Preparing or “prepping” skin before surgery can reduce the risk of infection at the surgical site. To make the process easier,[facility] has chosen 4% Chlorhexidine Gluconate (CHG) antiseptic solution.   The steps below outline the prepping process and should be carefully followed.                                                                                                                                                      Prep the skin at the following time(s):                                                      We recommend you shower the night before surgery, and again the morning of surgery with the 4% CHG antiseptic solution using half of the bottle and a cloth each time.  Dress in clean clothes/sleepwear after showering.  See instructions below for application.          Do not apply any lotions or moisturizers.       Do not shave the area to be prepped for at least 2 days prior to surgery.    Clipping the hair may be done immediately prior to your surgery at the hospital    if needed.    Directions:  Thoroughly rinse your body with water.  Apply 4% CHG to a cloth and wash skin gently, paying special attention to the operative site.  Rinse again thoroughly.  Once you have begun using this product do not apply anything else to your skin. If itching or redness persists, rinse affected areas and discontinue use.    When using this product:  • Keep out of eyes, ears, and mouth.  • If solution should contact these areas, rinse out promptly and thoroughly with water.  • For external use only.  • Do not use in genital area, on your  face or head.      PATIENT/FAMILY/RESPONSIBLE PARTY VERBALIZES UNDERSTANDING OF ABOVE EDUCATION.  COPY OF PAIN SCALE GIVEN AND REVIEWED WITH VERBALIZED UNDERSTANDING.

## 2019-01-15 ENCOUNTER — HOSPITAL ENCOUNTER (OUTPATIENT)
Facility: HOSPITAL | Age: 64
Setting detail: HOSPITAL OUTPATIENT SURGERY
Discharge: HOME | End: 2019-01-15
Attending: PODIATRIST | Admitting: PODIATRIST

## 2019-01-15 ENCOUNTER — ANESTHESIA EVENT (OUTPATIENT)
Dept: PERIOP | Facility: HOSPITAL | Age: 64
End: 2019-01-15

## 2019-01-15 ENCOUNTER — APPOINTMENT (OUTPATIENT)
Dept: GENERAL RADIOLOGY | Facility: HOSPITAL | Age: 64
End: 2019-01-15

## 2019-01-15 ENCOUNTER — ANESTHESIA (OUTPATIENT)
Dept: PERIOP | Facility: HOSPITAL | Age: 64
End: 2019-01-15

## 2019-01-15 VITALS
TEMPERATURE: 97.6 F | HEART RATE: 63 BPM | RESPIRATION RATE: 18 BRPM | DIASTOLIC BLOOD PRESSURE: 66 MMHG | OXYGEN SATURATION: 96 % | SYSTOLIC BLOOD PRESSURE: 132 MMHG

## 2019-01-15 PROCEDURE — 73620 X-RAY EXAM OF FOOT: CPT

## 2019-01-15 PROCEDURE — 25010000002 MIDAZOLAM PER 1 MG: Performed by: ANESTHESIOLOGY

## 2019-01-15 PROCEDURE — 25010000002 ROPIVACAINE PER 1 MG: Performed by: PODIATRIST

## 2019-01-15 PROCEDURE — 25010000002 METHYLPREDNISOLONE PER 80 MG: Performed by: PODIATRIST

## 2019-01-15 PROCEDURE — 25010000002 PROPOFOL 10 MG/ML EMULSION: Performed by: NURSE ANESTHETIST, CERTIFIED REGISTERED

## 2019-01-15 PROCEDURE — 76000 FLUOROSCOPY <1 HR PHYS/QHP: CPT

## 2019-01-15 RX ORDER — SODIUM CHLORIDE 0.9 % (FLUSH) 0.9 %
1-10 SYRINGE (ML) INJECTION AS NEEDED
Status: DISCONTINUED | OUTPATIENT
Start: 2019-01-15 | End: 2019-01-15 | Stop reason: HOSPADM

## 2019-01-15 RX ORDER — MIDAZOLAM HYDROCHLORIDE 1 MG/ML
2 INJECTION INTRAMUSCULAR; INTRAVENOUS
Status: DISCONTINUED | OUTPATIENT
Start: 2019-01-15 | End: 2019-01-15 | Stop reason: HOSPADM

## 2019-01-15 RX ORDER — METHYLPREDNISOLONE ACETATE 80 MG/ML
INJECTION, SUSPENSION INTRA-ARTICULAR; INTRALESIONAL; INTRAMUSCULAR; SOFT TISSUE AS NEEDED
Status: DISCONTINUED | OUTPATIENT
Start: 2019-01-15 | End: 2019-01-15 | Stop reason: HOSPADM

## 2019-01-15 RX ORDER — MEPERIDINE HYDROCHLORIDE 25 MG/ML
12.5 INJECTION INTRAMUSCULAR; INTRAVENOUS; SUBCUTANEOUS
Status: CANCELLED | OUTPATIENT
Start: 2019-01-15 | End: 2019-01-16

## 2019-01-15 RX ORDER — FENTANYL CITRATE 50 UG/ML
25 INJECTION, SOLUTION INTRAMUSCULAR; INTRAVENOUS AS NEEDED
Status: CANCELLED | OUTPATIENT
Start: 2019-01-15

## 2019-01-15 RX ORDER — SODIUM CHLORIDE, SODIUM LACTATE, POTASSIUM CHLORIDE, CALCIUM CHLORIDE 600; 310; 30; 20 MG/100ML; MG/100ML; MG/100ML; MG/100ML
100 INJECTION, SOLUTION INTRAVENOUS CONTINUOUS
Status: DISCONTINUED | OUTPATIENT
Start: 2019-01-15 | End: 2019-01-15 | Stop reason: HOSPADM

## 2019-01-15 RX ORDER — LABETALOL HYDROCHLORIDE 5 MG/ML
5 INJECTION, SOLUTION INTRAVENOUS
Status: CANCELLED | OUTPATIENT
Start: 2019-01-15

## 2019-01-15 RX ORDER — METOCLOPRAMIDE HYDROCHLORIDE 5 MG/ML
5 INJECTION INTRAMUSCULAR; INTRAVENOUS
Status: CANCELLED | OUTPATIENT
Start: 2019-01-15

## 2019-01-15 RX ORDER — SODIUM CHLORIDE, SODIUM LACTATE, POTASSIUM CHLORIDE, CALCIUM CHLORIDE 600; 310; 30; 20 MG/100ML; MG/100ML; MG/100ML; MG/100ML
1000 INJECTION, SOLUTION INTRAVENOUS CONTINUOUS
Status: DISCONTINUED | OUTPATIENT
Start: 2019-01-15 | End: 2019-01-15 | Stop reason: HOSPADM

## 2019-01-15 RX ORDER — SODIUM CHLORIDE 0.9 % (FLUSH) 0.9 %
3 SYRINGE (ML) INJECTION AS NEEDED
Status: DISCONTINUED | OUTPATIENT
Start: 2019-01-15 | End: 2019-01-15 | Stop reason: HOSPADM

## 2019-01-15 RX ORDER — ONDANSETRON 2 MG/ML
4 INJECTION INTRAMUSCULAR; INTRAVENOUS ONCE AS NEEDED
Status: CANCELLED | OUTPATIENT
Start: 2019-01-15

## 2019-01-15 RX ORDER — NALOXONE HCL 0.4 MG/ML
0.4 VIAL (ML) INJECTION AS NEEDED
Status: CANCELLED | OUTPATIENT
Start: 2019-01-15

## 2019-01-15 RX ORDER — MIDAZOLAM HYDROCHLORIDE 1 MG/ML
1 INJECTION INTRAMUSCULAR; INTRAVENOUS
Status: DISCONTINUED | OUTPATIENT
Start: 2019-01-15 | End: 2019-01-15 | Stop reason: HOSPADM

## 2019-01-15 RX ORDER — PROPOFOL 10 MG/ML
VIAL (ML) INTRAVENOUS AS NEEDED
Status: DISCONTINUED | OUTPATIENT
Start: 2019-01-15 | End: 2019-01-15 | Stop reason: SURG

## 2019-01-15 RX ORDER — LIDOCAINE HYDROCHLORIDE 20 MG/ML
INJECTION, SOLUTION INFILTRATION; PERINEURAL AS NEEDED
Status: DISCONTINUED | OUTPATIENT
Start: 2019-01-15 | End: 2019-01-15 | Stop reason: SURG

## 2019-01-15 RX ORDER — ROPIVACAINE HYDROCHLORIDE 5 MG/ML
INJECTION, SOLUTION EPIDURAL; INFILTRATION; PERINEURAL AS NEEDED
Status: DISCONTINUED | OUTPATIENT
Start: 2019-01-15 | End: 2019-01-15 | Stop reason: HOSPADM

## 2019-01-15 RX ORDER — SODIUM CHLORIDE 0.9 % (FLUSH) 0.9 %
3 SYRINGE (ML) INJECTION EVERY 12 HOURS SCHEDULED
Status: DISCONTINUED | OUTPATIENT
Start: 2019-01-15 | End: 2019-01-15 | Stop reason: HOSPADM

## 2019-01-15 RX ADMIN — MIDAZOLAM HYDROCHLORIDE 2 MG: 1 INJECTION, SOLUTION INTRAMUSCULAR; INTRAVENOUS at 10:05

## 2019-01-15 RX ADMIN — LIDOCAINE HYDROCHLORIDE 0.5 ML: 10 INJECTION, SOLUTION EPIDURAL; INFILTRATION; INTRACAUDAL; PERINEURAL at 09:31

## 2019-01-15 RX ADMIN — PROPOFOL 200 MG: 10 INJECTION, EMULSION INTRAVENOUS at 11:39

## 2019-01-15 RX ADMIN — LIDOCAINE HYDROCHLORIDE 50 MG: 20 INJECTION, SOLUTION INFILTRATION; PERINEURAL at 11:39

## 2019-01-15 RX ADMIN — SODIUM CHLORIDE, POTASSIUM CHLORIDE, SODIUM LACTATE AND CALCIUM CHLORIDE 1000 ML: 600; 310; 30; 20 INJECTION, SOLUTION INTRAVENOUS at 09:31

## 2019-01-15 NOTE — DISCHARGE INSTRUCTIONS

## 2019-01-15 NOTE — OP NOTE
STEROID INJECTION  Procedure Note    Sada R Carmela  1/15/2019    Pre-op Diagnosis:   OSTEOARTHRITIS MIDFOOT- BILATERAL FEET  * Primary osteoarthritis of left foot [M19.072]     * Primary osteoarthritis of right foot [M19.071]     * Pain, foot [M79.673]    Post-op Diagnosis:     Post-Op Diagnosis Codes:     * Primary osteoarthritis of left foot [M19.072]     * Primary osteoarthritis of right foot [M19.071]     * Pain, foot [M79.673]    Procedure/CPT® Codes:      Procedure(s):  1)  2, 3, TARSOMETATARSAL JOINT INJECTION WITH FLUROSCOPIC GUIDANCE- right foot  2)  2, 3, TARSOMETATARSAL JOINT INJECTION WITH FLUROSCOPIC GUIDANCE- left foot    Surgeon(s):  Senthil Curtis DPM    Anesthesia: Local    Staff:   Circulator: Kat Singleton RN  Scrub Person: Tania Williamson; Jose Villatoro    Indications for procedure:  Chronic Midfoot arthritis pain    Procedure details:  The patient was brought to the operating room and placed under sedation following procedures were performed.    Procedure #1 intra-articular cortical steroid injection under fluoroscopic guidance second and third tarsal metatarsal joint right foot    Was directed to the dorsolateral aspect patient's right foot were usual fashion under aseptic technique utilizing a 25-gauge 1/2 inch needle combination of 2 mL 0.5% naropin plain and 1 mL of Depo-Medrol 80 mg/ml was infiltrated into second third tarsal metatarsal joints under direct fluoroscopic guidance.  Band-Aid was applied    Procedure #2 intra-articular cortical steroid injection under fluoroscopic guidance second and third tarsal metatarsal joint left foot    Was directed to the dorsolateral aspect patient's left foot were usual fashion under aseptic technique utilizing a 25-gauge 1/2 inch needle combination of 2 mL 0.5% naropin plain and 1 mL of Depo-Medrol 80 mg/ml was infiltrated into second third tarsal metatarsal joints under direct fluoroscopic guidance.  Band-Aid was  applied          Estimated Blood Loss: none    Specimens:                None      Drains:  none    Implants: Nothing was implanted during the procedure     Complications: none    Follow up:   Weightbearing in normal shoe gear    Senthil Curtis DPM     Date: 1/15/2019  Time: 12:09 PM

## 2019-01-15 NOTE — ANESTHESIA PREPROCEDURE EVALUATION
Anesthesia Evaluation     Patient summary reviewed   no history of anesthetic complications:  NPO Solid Status: > 8 hours  NPO Liquid Status: > 8 hours           Airway   Mallampati: III  TM distance: <3 FB  Possible difficult intubation  Dental          Pulmonary    (+) sleep apnea on CPAP,   (-) COPD, asthma, not a smoker  Cardiovascular     Patient on routine beta blocker and Beta blocker given within 24 hours of surgery    (+) hypertension, hyperlipidemia,       Neuro/Psych  (+) numbness, psychiatric history Anxiety,     (-) seizures, TIA, CVA  GI/Hepatic/Renal/Endo    (+) morbid obesity,    (-) liver disease, no renal disease, diabetes    Musculoskeletal     Abdominal    Substance History      OB/GYN          Other   (+) arthritis                     Anesthesia Plan    ASA 3     MAC     intravenous induction   Anesthetic plan, all risks, benefits, and alternatives have been provided, discussed and informed consent has been obtained with: patient.

## 2019-01-15 NOTE — ANESTHESIA POSTPROCEDURE EVALUATION
Patient: Sada Casey    Procedure Summary     Date:  01/15/19 Room / Location:  [facility] OR [address] [place] [address] [facility]AD OR    Anesthesia Start:  1137 Anesthesia Stop:  1149    Procedure:  2, 3, TARSOMETATARSAL JOINT INJECTION WITH FLUROSCOPIC GUIDANCE- BILATERAL FEET (Bilateral ) Diagnosis:  (OSTEOARTHRITIS MIDFOOT- BILATERAL FEET)    Surgeon:  Senthil Curtis DPM Provider:  Mani Cooper CRNA    Anesthesia Type:  MAC ASA Status:  3          Anesthesia Type: MAC  Last vitals  BP   151/74 (01/15/19 0901)   Temp   97.6 °F (36.4 °C) (01/15/19 0901)   Pulse   62 (01/15/19 0941)   Resp   18 (01/15/19 0901)     SpO2   97 % (01/15/19 0941)     Post Anesthesia Care and Evaluation    Patient location during evaluation: PHASE II  Level of consciousness: awake and alert  Pain management: adequate  Airway patency: patent  Anesthetic complications: No anesthetic complications    Cardiovascular status: acceptable  Respiratory status: acceptable  Hydration status: acceptable

## 2019-02-01 ENCOUNTER — ANESTHESIA (OUTPATIENT)
Dept: GASTROENTEROLOGY | Facility: HOSPITAL | Age: 64
End: 2019-02-01

## 2019-02-01 ENCOUNTER — HOSPITAL ENCOUNTER (OUTPATIENT)
Facility: HOSPITAL | Age: 64
Setting detail: HOSPITAL OUTPATIENT SURGERY
Discharge: HOME | End: 2019-02-01
Attending: INTERNAL MEDICINE | Admitting: INTERNAL MEDICINE

## 2019-02-01 ENCOUNTER — TELEPHONE (OUTPATIENT)
Dept: GASTROENTEROLOGY | Facility: CLINIC | Age: 64
End: 2019-02-01

## 2019-02-01 ENCOUNTER — ANESTHESIA EVENT (OUTPATIENT)
Dept: GASTROENTEROLOGY | Facility: HOSPITAL | Age: 64
End: 2019-02-01

## 2019-02-01 VITALS
DIASTOLIC BLOOD PRESSURE: 53 MMHG | SYSTOLIC BLOOD PRESSURE: 110 MMHG | BODY MASS INDEX: 39.87 KG/M2 | HEART RATE: 54 BPM | WEIGHT: 225 LBS | HEIGHT: 63 IN | OXYGEN SATURATION: 95 % | TEMPERATURE: 97.2 F | RESPIRATION RATE: 25 BRPM

## 2019-02-01 PROCEDURE — 25010000002 PROPOFOL 10 MG/ML EMULSION: Performed by: NURSE ANESTHETIST, CERTIFIED REGISTERED

## 2019-02-01 PROCEDURE — G0121 COLON CA SCRN NOT HI RSK IND: HCPCS | Performed by: INTERNAL MEDICINE

## 2019-02-01 RX ORDER — LIDOCAINE HYDROCHLORIDE 20 MG/ML
INJECTION, SOLUTION INFILTRATION; PERINEURAL AS NEEDED
Status: DISCONTINUED | OUTPATIENT
Start: 2019-02-01 | End: 2019-02-01 | Stop reason: SURG

## 2019-02-01 RX ORDER — SODIUM CHLORIDE 9 MG/ML
500 INJECTION, SOLUTION INTRAVENOUS CONTINUOUS PRN
Status: DISCONTINUED | OUTPATIENT
Start: 2019-02-01 | End: 2019-02-01 | Stop reason: HOSPADM

## 2019-02-01 RX ORDER — SODIUM CHLORIDE 0.9 % (FLUSH) 0.9 %
3 SYRINGE (ML) INJECTION AS NEEDED
Status: DISCONTINUED | OUTPATIENT
Start: 2019-02-01 | End: 2019-02-01 | Stop reason: HOSPADM

## 2019-02-01 RX ORDER — PROPOFOL 10 MG/ML
VIAL (ML) INTRAVENOUS AS NEEDED
Status: DISCONTINUED | OUTPATIENT
Start: 2019-02-01 | End: 2019-02-01 | Stop reason: SURG

## 2019-02-01 RX ADMIN — PROPOFOL 340 MG: 10 INJECTION, EMULSION INTRAVENOUS at 09:25

## 2019-02-01 RX ADMIN — LIDOCAINE HYDROCHLORIDE 60 MG: 20 INJECTION, SOLUTION INFILTRATION; PERINEURAL at 09:25

## 2019-02-01 RX ADMIN — SODIUM CHLORIDE 500 ML: 9 INJECTION, SOLUTION INTRAVENOUS at 07:47

## 2019-02-01 NOTE — ANESTHESIA POSTPROCEDURE EVALUATION
"Patient: Sada Casey    Procedure Summary     Date:  02/01/19 Room / Location:  [facility] ENDOSCOPY 4 / [facility] ENDOSCOPY    Anesthesia Start:  0922 Anesthesia Stop:  0941    Procedure:  COLONOSCOPY WITH ANESTHESIA (N/A ) Diagnosis:       Colon cancer screening      (Colon cancer screening [Z12.11])    Surgeon:  Shawnee Marin MD Provider:  Tanya Hdz CRNA    Anesthesia Type:  MAC, general ASA Status:  2          Anesthesia Type: MAC, general  Last vitals  BP   102/56 (02/01/19 0945)   Temp   97.2 °F (36.2 °C) (02/01/19 0724)   Pulse   57 (02/01/19 0945)   Resp   20 (02/01/19 0945)     SpO2   95 % (02/01/19 0945)     Post Anesthesia Care and Evaluation    Patient location during evaluation: PACU  Patient participation: complete - patient participated  Level of consciousness: awake and alert  Pain management: adequate  Airway patency: patent  Anesthetic complications: No anesthetic complications    Cardiovascular status: acceptable  Respiratory status: acceptable  Hydration status: acceptable    Comments: Blood pressure 102/56, pulse 57, temperature 97.2 °F (36.2 °C), temperature source Temporal, resp. rate 20, height 160 cm (63\"), weight 102 kg (225 lb), SpO2 95 %.    Pt discharged from PACU based on dilia score >8      "

## 2019-02-01 NOTE — H&P
Chief Complaint:   Screening    Subjective     HPI:   She presents for screening colonoscopy.  Last colonoscopy was over 8 years ago and was unremarkable.  Family history is negative.    Past Medical History:   Past Medical History:   Diagnosis Date   • Anxiety    • Arthritis    • DVT (deep venous thrombosis) (CMS/HCC)     left leg, about 6 yrs   • Eczema    • GERD (gastroesophageal reflux disease)    • Hyperlipidemia    • Hypertension    • Right leg numbness    • Sleep apnea     cpap   • Stress incontinence        Past Surgical History:  Past Surgical History:   Procedure Laterality Date   • BREAST LUMPECTOMY Left    • COLONOSCOPY  12/15/2011   • EYE SURGERY Bilateral     cataract surgery with lens implants    • HYSTERECTOMY     • STEROID INJECTION Bilateral 8/21/2018    Procedure: 2, 3 TARSOMETATARSAL JOINT INJECTION WITH FLUROSCOPIC GUIDANCE - BILATERAL FEET;  Surgeon: Senthil Curtis DPM;  Location:  [facility];  Service: Podiatry   • STEROID INJECTION Bilateral 1/15/2019    Procedure: 2, 3, TARSOMETATARSAL JOINT INJECTION WITH FLUROSCOPIC GUIDANCE- BILATERAL FEET;  Surgeon: Senthil Curtis DPM;  Location:  [facility];  Service: Podiatry   • UPPER GASTROINTESTINAL ENDOSCOPY  12/15/2011        Family History:  Family History   Problem Relation Age of Onset   • No Known Problems Father    • Coronary artery disease Mother    • No Known Problems Sister    • No Known Problems Sister    • No Known Problems Sister    • No Known Problems Sister    • Colon cancer Neg Hx    • Colon polyps Neg Hx        Social History:   reports that  has never smoked. she has never used smokeless tobacco. She reports that she drinks alcohol. She reports that she does not use drugs.    Medications:   Medications Prior to Admission   Medication Sig Dispense Refill Last Dose   • aspirin 325 MG tablet Take 325 mg by mouth Daily. Held 8/16/2018 1/31/2019 at 2100   • hydrochlorothiazide (MICROZIDE) 12.5 MG capsule Take 12.5 mg by mouth  "every morning    1/31/2019 at Unknown time   • Ibuprofen-Famotidine (DUEXIS) 800-26.6 MG tablet Take 1 tablet by mouth 3 (Three) Times a Day As Needed.   1/31/2019 at Unknown time   • irbesartan (AVAPRO) 150 MG tablet Take 150 mg by mouth daily    1/31/2019 at Unknown time   • metoprolol succinate XL (TOPROL-XL) 100 MG 24 hr tablet Take 100 mg by mouth daily    1/31/2019 at 2100   • sertraline (ZOLOFT) 50 MG tablet Take 50 mg by mouth Daily.   1/31/2019 at Unknown time   • cimetidine (TAGAMET HB) 200 MG tablet Take 200 mg by mouth 2 times daily as needed   More than a month at Unknown time   • clotrimazole-betamethasone (LOTRISONE) 1-0.05 % cream Apply  topically 2 (Two) Times a Day. (Patient taking differently: Apply  topically to the appropriate area as directed 2 (Two) Times a Day. As needed) 15 g 0 Unknown at Unknown time   • vitamin D (ERGOCALCIFEROL) 05516 units capsule capsule Take 50,000 Units by mouth 1 (One) Time Per Week.   1/28/2019       Allergies:  Acetaminophen; Naprosyn [naproxen]; and Nyquil multi-symptom [pseudoeph-doxylamine-dm-apap]    ROS:    General: Weight stable  Resp: No SOA  Cardiovascular: No CP      Objective     /73 (Patient Position: Sitting)   Pulse 58   Temp 97.2 °F (36.2 °C) (Temporal)   Resp 18   Ht 160 cm (63\")   Wt 102 kg (225 lb)   SpO2 96%   BMI 39.86 kg/m²     Physical Exam   Constitutional: Pt is oriented to person, place, and in no distress.  Eyes: No icterus  ENMT: Unremarkable   Cardiovascular: Normal rate, regular rhythm.    Pulmonary/Chest: No distress.  No audible wheezes  Abdominal: Soft.   Skin: Skin is warm and dry.   Psychiatric: Mood, memory, affect and judgment appear normal.     Assessment/Plan     Diagnosis:    screening    Anticipated Surgical Procedure:  Colonoscopy    The risks, benefits, and alternatives of colonoscopy were reviewed with the patient today.  Risks including perforation of the colon possibly requiring surgery or colostomy.  " Additional risks include risk of bleeding from biopsies or removal of colon tissue.  There is also the risk of a drug reaction or problems with anesthesia.  This will be discussed with the further by the anesthesia team on the day of the procedure.  Lastly there is a possibility of missing a colon polyp or cancer.  The benefits include the diagnosis and management of disease of the colon and rectum.  Alternatives to colonoscopy include barium enema, laboratory testing, radiographic evaluation, or no intervention.  The patient verbalizes understanding and agrees.    Much of this encounter note is an electronic transcription/translation of spoken language to printed text. The electronic translation of spoken language may permit erroneous, or at times, nonsensical words or phrases to be inadvertently transcribed; although I have reviewed the note for such errors, some may still exist.

## 2019-02-01 NOTE — ANESTHESIA PREPROCEDURE EVALUATION
Anesthesia Evaluation     Patient summary reviewed and Nursing notes reviewed   no history of anesthetic complications:  NPO Solid Status: > 8 hours  NPO Liquid Status: > 2 hours           Airway   Mallampati: II  TM distance: >3 FB  Neck ROM: full  No difficulty expected  Dental - normal exam     Pulmonary    (+) sleep apnea on CPAP,   Cardiovascular     (+) hypertension, DVT resolved, hyperlipidemia,       Neuro/Psych  (+) psychiatric history Anxiety,     GI/Hepatic/Renal/Endo    (+) obesity,  GERD,      Musculoskeletal     Abdominal    Substance History - negative use     OB/GYN negative ob/gyn ROS         Other   (+) arthritis                   Anesthesia Plan    ASA 2     MAC and general     intravenous induction   Anesthetic plan, all risks, benefits, and alternatives have been provided, discussed and informed consent has been obtained with: patient.

## 2019-03-25 ENCOUNTER — OFFICE VISIT (OUTPATIENT)
Dept: NEUROLOGY | Age: 64
End: 2019-03-25
Payer: COMMERCIAL

## 2019-03-25 VITALS
HEIGHT: 63 IN | BODY MASS INDEX: 40.57 KG/M2 | DIASTOLIC BLOOD PRESSURE: 75 MMHG | SYSTOLIC BLOOD PRESSURE: 134 MMHG | HEART RATE: 60 BPM | WEIGHT: 229 LBS

## 2019-03-25 DIAGNOSIS — G47.31 CENTRAL SLEEP APNEA: ICD-10-CM

## 2019-03-25 DIAGNOSIS — G47.33 OBSTRUCTIVE SLEEP APNEA: Primary | ICD-10-CM

## 2019-03-25 DIAGNOSIS — Z99.89 BIPAP (BIPHASIC POSITIVE AIRWAY PRESSURE) DEPENDENCE: ICD-10-CM

## 2019-03-25 PROCEDURE — 99213 OFFICE O/P EST LOW 20 MIN: CPT | Performed by: PHYSICIAN ASSISTANT

## 2019-03-25 RX ORDER — CHOLECALCIFEROL (VITAMIN D3) 1250 MCG
CAPSULE ORAL WEEKLY
COMMUNITY
End: 2019-12-27

## 2019-03-25 RX ORDER — ASPIRIN 325 MG
325 TABLET ORAL DAILY
COMMUNITY

## 2019-09-09 ENCOUNTER — TELEPHONE (OUTPATIENT)
Dept: GASTROENTEROLOGY | Facility: CLINIC | Age: 64
End: 2019-09-09

## 2019-09-09 RX ORDER — DICYCLOMINE HYDROCHLORIDE 10 MG/1
10 CAPSULE ORAL
Qty: 120 CAPSULE | Refills: 0 | Status: SHIPPED | OUTPATIENT
Start: 2019-09-09 | End: 2019-11-07 | Stop reason: SINTOL

## 2019-09-09 NOTE — TELEPHONE ENCOUNTER
Yes.  Needs appt with Jaqui.    I sent a 1 month supply of Bentyl sent to her pharmacy.  She will take 1 capsule 30 minutes before breakfast before lunch before dinner and at nighttime as needed.    Shawnee Marin MD

## 2019-09-09 NOTE — TELEPHONE ENCOUNTER
"Pt just called me-states she is having a lot of abdominal cramping. This has been going on a while. She tells me every morning she has diarrhea and cramping that lasts several hours-starts after breakfast. Most days it slacks off by the afternoon, but occasionally these \"episodes\" last all day.     Pt takes Pepto every morning and and an OTC Omeprazole every day-those seem to help some. She tells me she has the abdominal cramping even on days she doesn't have diarrhea. No BRBPR, fever, or n/v.     She said she knows she probably needs an appt, but was asking if there was something you could call in for her to help until she can be seen? I told her I would message you and call her back later with your recommendations. Thanks!     [Uses [facility]]  "

## 2019-09-09 NOTE — TELEPHONE ENCOUNTER
Patient wanted me to schedule after the first of October due to being out of town.  I have her scheduled for October 7th with Jaqui.

## 2019-09-09 NOTE — TELEPHONE ENCOUNTER
Spoke to pt re: Dr. Marin's recommendations-she VU.  Pt advised to call me back with any further questions/problems.    Milla-I tried to transfer her to schedule OV but no one was available. Can you call her and schedule an appt with Jaqui in the next couple of weeks-just whatever is good for the pt. Thanks!

## 2019-09-30 ENCOUNTER — OFFICE VISIT (OUTPATIENT)
Dept: GASTROENTEROLOGY | Facility: CLINIC | Age: 64
End: 2019-09-30

## 2019-09-30 VITALS
HEART RATE: 75 BPM | TEMPERATURE: 98.7 F | BODY MASS INDEX: 34.02 KG/M2 | OXYGEN SATURATION: 98 % | SYSTOLIC BLOOD PRESSURE: 122 MMHG | HEIGHT: 63 IN | WEIGHT: 192 LBS | DIASTOLIC BLOOD PRESSURE: 74 MMHG

## 2019-09-30 DIAGNOSIS — R19.7 DIARRHEA, UNSPECIFIED TYPE: Primary | ICD-10-CM

## 2019-09-30 DIAGNOSIS — R10.9 ABDOMINAL CRAMPING: ICD-10-CM

## 2019-09-30 PROCEDURE — 99214 OFFICE O/P EST MOD 30 MIN: CPT | Performed by: NURSE PRACTITIONER

## 2019-09-30 RX ORDER — DICLOFENAC SODIUM 75 MG/1
1 TABLET, DELAYED RELEASE ORAL DAILY
COMMUNITY
Start: 2019-08-30

## 2019-09-30 RX ORDER — DULOXETIN HYDROCHLORIDE 30 MG/1
60 CAPSULE, DELAYED RELEASE ORAL DAILY
COMMUNITY
End: 2022-09-06

## 2019-09-30 NOTE — PROGRESS NOTES
Chief Complaint:   Chief Complaint   Patient presents with   • Follow-up     Pt here to f/u on abdominal cramping and diarrhea-was started on Bentyl a couple of weeks ago; Pt states she takes the Bentyl usually twice a day, but sometimes 3x/day-still having some cramping and diarrhea         Patient ID: Sada Casey is a 64 y.o. female     History of Present Illness: This is a very pleasant 64-year-old female who is here today to follow-up for abdominal cramping.    The patient's last colonoscopy was performed on 2/1/2019 with findings of diverticulosis only.  The patient states that sometime shortly after her colonoscopy  for the past 6 months she began to have diarrhea along with abdominal cramping. She called our office and Bentyl was given. She has tried Pepto which seems to help and Bently has helped some.  She states that she will have up to 5 watery and sometimes explosive stools.  She states that this is always accompanied by abdominal cramping but the abdominal cramping can occur without the diarrhea.  She denies any previous antibiotics.    The patient denies any nausea, vomiting, epigastric pain, dysphagia, pyrosis or hematemesis.  The patient denies any fever or chills.  Denies any melena or hematochezia.  Denies any unintentional weight loss or loss of appetite.    Past Medical History:   Diagnosis Date   • Anxiety    • Arthritis    • DVT (deep venous thrombosis) (CMS/HCC)     left leg, about 6 yrs   • Eczema    • GERD (gastroesophageal reflux disease)    • Hyperlipidemia    • Hypertension    • Insomnia    • Right leg numbness    • Sleep apnea     cpap   • Stress incontinence    • TIA (transient ischemic attack)        Past Surgical History:   Procedure Laterality Date   • BREAST LUMPECTOMY Left    • CATARACT EXTRACTION WITH INTRAOCULAR LENS IMPLANT Bilateral    • COLONOSCOPY  12/15/2011    Diverticulosis; Repeat 7 years   • COLONOSCOPY N/A 2/1/2019    Diverticulosis in the left colon; The  examination was otherwise normal on direct and retroflexion views; No specimens collected; Repeat 10 years   • ENDOSCOPY  12/15/2011    [facility]   • HYSTERECTOMY     • STEROID INJECTION Bilateral 8/21/2018    Procedure: 2, 3 TARSOMETATARSAL JOINT INJECTION WITH FLUROSCOPIC GUIDANCE - BILATERAL FEET;  Surgeon: Senthil Curtis DPM;  Location:  [facility];  Service: Podiatry   • STEROID INJECTION Bilateral 1/15/2019    Procedure: 2, 3, TARSOMETATARSAL JOINT INJECTION WITH FLUROSCOPIC GUIDANCE- BILATERAL FEET;  Surgeon: Senthil Curtis DPM;  Location:  [facility];  Service: Podiatry         Current Outpatient Medications:   •  aspirin 325 MG tablet, Take 325 mg by mouth Daily., Disp: , Rfl:   •  cimetidine (TAGAMET HB) 200 MG tablet, Take 200 mg by mouth 2 times daily as needed, Disp: , Rfl:   •  clotrimazole-betamethasone (LOTRISONE) 1-0.05 % cream, Apply  topically 2 (Two) Times a Day. (Patient taking differently: Apply  topically to the appropriate area as directed 2 (Two) Times a Day. As needed), Disp: 15 g, Rfl: 0  •  diclofenac (VOLTAREN) 75 MG EC tablet, Take 1 tablet by mouth 2 (Two) Times a Day., Disp: , Rfl:   •  dicyclomine (BENTYL) 10 MG capsule, Take 1 capsule by mouth 4 (Four) Times a Day Before Meals & at Bedtime., Disp: 120 capsule, Rfl: 0  •  DULoxetine (CYMBALTA) 30 MG capsule, Take 30 mg by mouth Daily., Disp: , Rfl:   •  hydrochlorothiazide (MICROZIDE) 12.5 MG capsule, Take 12.5 mg by mouth every morning , Disp: , Rfl:   •  irbesartan (AVAPRO) 150 MG tablet, Take 150 mg by mouth daily , Disp: , Rfl:   •  metoprolol succinate XL (TOPROL-XL) 100 MG 24 hr tablet, Take 100 mg by mouth daily , Disp: , Rfl:     Allergies   Allergen Reactions   • Acetaminophen Rash   • Naprosyn [Naproxen] Rash   • Nyquil Multi-Symptom [Pseudoeph-Doxylamine-Dm-Apap] Rash       Social History     Socioeconomic History   • Marital status:      Spouse name: Not on file   • Number of children: Not on file   • Years of  "education: Not on file   • Highest education level: Not on file   Tobacco Use   • Smoking status: Never Smoker   • Smokeless tobacco: Never Used   Substance and Sexual Activity   • Alcohol use: Yes     Comment: Occasional   • Drug use: No   • Sexual activity: Defer       Family History   Problem Relation Age of Onset   • No Known Problems Father    • Coronary artery disease Mother    • No Known Problems Sister    • No Known Problems Sister    • No Known Problems Sister    • No Known Problems Sister    • Colon cancer Neg Hx    • Colon polyps Neg Hx    • Esophageal cancer Neg Hx    • Liver cancer Neg Hx    • Liver disease Neg Hx    • Rectal cancer Neg Hx    • Stomach cancer Neg Hx        Vitals:    09/30/19 0843   BP: 122/74   BP Location: Left arm   Patient Position: Sitting   Cuff Size: Adult   Pulse: 75   Temp: 98.7 °F (37.1 °C)   TempSrc: Tympanic   SpO2: 98%   Weight: 87.1 kg (192 lb)   Height: 160 cm (63\")       Review of Systems:    General:    Present -feeling well   Skin:    Not Present-Rash   HEENT:     Not Present-Acute visual changes or Acute hearing changes   Neck :    Not Present- swollen glands   Genitourinary:      Not Present- burning, frequency, urgency hematuria, dysuria,   Cardiovascular:   Not Present-chest pain, palpitations, or pressure   Respiratory:   Not Present- shortness of breath or cough   Gastrointestinal:  Musculoskeletal:  Neurological:  Psychiatric:   Present as mentioned in the HP    Not Present. Recent gait disturbances.    Not Present-Seizures and weakness in extremities.    Not Present- Anxiety or Depression.       Physical Exam:    General Appearance:    Alert, cooperative, in no acute distress   Psych:    Mood appropriate    Eyes:          conjunctivae and sclerae normal, no   icterus, no pallor   ENMT:    Ears appear intact with no abnormalities noted oral mucosa moist   Neck:   No adenopathy, supple, trachea midline, no thyromegaly, no   carotid bruit, no JVD    " Cardiovascular:    Regular rhythm and normal rate, normal S1 and S2, no            murmur, no gallop, no rub, no click   Gastrointestinal:     Inspection normal.  Normal bowel sounds, no masses, no organomegaly, soft round non-tender, non-distended, no guarding, no rebound or tenderness. No hepatosplenomegaly.   Skin:   No bleeding, bruising or rash   Neurologic:   nonfocal       Lab Results   Component Value Date    WBC 8.78 01/14/2019    WBC 8.24 08/16/2018    HGB 13.1 01/14/2019    HGB 12.4 08/16/2018    HCT 41.4 01/14/2019    HCT 39.0 08/16/2018     01/14/2019     08/16/2018        Lab Results   Component Value Date     01/14/2019     08/16/2018    K 4.0 01/14/2019    K 3.5 08/16/2018     01/14/2019     08/16/2018    CO2 28.0 01/14/2019    CO2 29.0 08/16/2018    BUN 11 01/14/2019    BUN 14 08/16/2018    CREATININE 0.49 (L) 01/14/2019    CREATININE 0.65 08/16/2018    GLUCOSE 150 (H) 01/14/2019    GLUCOSE 161 (H) 08/16/2018       No results found for: INR    Body mass index is 34.01 kg/m². Patient's Body mass index is 34.01 kg/m². BMI is above normal parameters. Recommendations include: nutrition counseling.    Assessment and Plan:  Assessment/Plan   Sada was seen today for follow-up.    Diagnoses and all orders for this visit:    Diarrhea, unspecified type  Comments:  Will check gastrointestinal panel for pathogens.  Patient states she recently had CBC CMP and thyroid check all normal with PCP  Orders:  -     Gastrointestinal Panel, PCR - Stool, Per Rectum; Future  -     Fecal Leukocytes - Stool, Per Rectum; Future  -     CT Abdomen Pelvis Without Contrast; Future    Abdominal cramping  Comments:  Check CT of abdomen and pelvis depending on findings may schedule gallbladder ultrasound and HIDA scan  Orders:  -     Gastrointestinal Panel, PCR - Stool, Per Rectum; Future  -     Fecal Leukocytes - Stool, Per Rectum; Future  -     CT Abdomen Pelvis Without Contrast;  Future             There are no Patient Instructions on file for this visit.    Next follow-up appointment          EMR Dragon/Transcription disclaimer:  Much of this encounter note is an electronic transcription/translation of spoken language to printed text. The electronic translation of spoken language may permit erroneous, or at times, nonsensical words or phrases to be inadvertently transcribed; although I have reviewed the note for such errors, some may still exist.

## 2019-10-01 ENCOUNTER — LAB (OUTPATIENT)
Dept: LAB | Facility: HOSPITAL | Age: 64
End: 2019-10-01

## 2019-10-01 DIAGNOSIS — R19.7 DIARRHEA, UNSPECIFIED TYPE: ICD-10-CM

## 2019-10-01 DIAGNOSIS — R10.9 ABDOMINAL CRAMPING: ICD-10-CM

## 2019-10-01 LAB
ADV 40+41 DNA STL QL NAA+NON-PROBE: NOT DETECTED
ASTRO TYP 1-8 RNA STL QL NAA+NON-PROBE: NOT DETECTED
C CAYETANENSIS DNA STL QL NAA+NON-PROBE: NOT DETECTED
C DIFF TOX GENS STL QL NAA+PROBE: NOT DETECTED
CAMPY SP DNA.DIARRHEA STL QL NAA+PROBE: NOT DETECTED
CRYPTOSP STL CULT: NOT DETECTED
E COLI DNA SPEC QL NAA+PROBE: NOT DETECTED
E HISTOLYT AG STL-ACNC: NOT DETECTED
EAEC PAA PLAS AGGR+AATA ST NAA+NON-PRB: NOT DETECTED
EC STX1 + STX2 GENES STL NAA+PROBE: NOT DETECTED
EPEC EAE GENE STL QL NAA+NON-PROBE: NOT DETECTED
ETEC LTA+ST1A+ST1B TOX ST NAA+NON-PROBE: NOT DETECTED
G LAMBLIA DNA SPEC QL NAA+PROBE: NOT DETECTED
NOROVIRUS GI+II RNA STL QL NAA+NON-PROBE: NOT DETECTED
P SHIGELLOIDES DNA STL QL NAA+PROBE: NOT DETECTED
RV RNA STL NAA+PROBE: NOT DETECTED
SALMONELLA DNA SPEC QL NAA+PROBE: NOT DETECTED
SAPO I+II+IV+V RNA STL QL NAA+NON-PROBE: NOT DETECTED
SHIGELLA SP+EIEC IPAH STL QL NAA+PROBE: NOT DETECTED
V CHOLERAE DNA SPEC QL NAA+PROBE: NOT DETECTED
VIBRIO DNA SPEC NAA+PROBE: NOT DETECTED
WBC STL QL MICRO: ABNORMAL
YERSINIA STL CULT: NOT DETECTED

## 2019-10-01 PROCEDURE — 87205 SMEAR GRAM STAIN: CPT | Performed by: NURSE PRACTITIONER

## 2019-10-01 PROCEDURE — 0097U HC BIOFIRE FILMARRAY GI PANEL: CPT | Performed by: NURSE PRACTITIONER

## 2019-10-02 ENCOUNTER — TELEPHONE (OUTPATIENT)
Dept: GASTROENTEROLOGY | Facility: CLINIC | Age: 64
End: 2019-10-02

## 2019-10-02 NOTE — TELEPHONE ENCOUNTER
----- Message from VINICIO Taylor sent at 10/2/2019  1:59 PM CDT -----  Please notify patient gastrointestinal panel was negative for pathogens

## 2019-10-02 NOTE — TELEPHONE ENCOUNTER
I called and spoke to pt re: results-she VU. Pt tells me she went and picked up some Activia after she left the office Monday when she saw you. She states since she has started that, she hasn't had any diarrhea or cramping.     She wants to hold off on the CT right now to see if this continues to help. I told her I would let you know and if you thought it wasn't ok to wait or if you had any further recommendations, then I would call her back.     Otherwise-she was advised to update me on how she is feeling. She understands if the cramping/diarrhea were to return, then the CT would be the first thing that would need to be done.

## 2019-10-15 DIAGNOSIS — R19.7 DIARRHEA, UNSPECIFIED TYPE: ICD-10-CM

## 2019-10-15 DIAGNOSIS — R10.9 ABDOMINAL CRAMPING: Primary | ICD-10-CM

## 2019-10-28 ENCOUNTER — HOSPITAL ENCOUNTER (OUTPATIENT)
Dept: CT IMAGING | Facility: HOSPITAL | Age: 64
Discharge: HOME | End: 2019-10-28
Admitting: NURSE PRACTITIONER

## 2019-10-28 DIAGNOSIS — R19.7 DIARRHEA, UNSPECIFIED TYPE: ICD-10-CM

## 2019-10-28 DIAGNOSIS — R10.9 ABDOMINAL CRAMPING: ICD-10-CM

## 2019-10-28 PROCEDURE — 74176 CT ABD & PELVIS W/O CONTRAST: CPT

## 2019-10-28 PROCEDURE — 0 IOHEXOL 300 MG/ML SOLUTION: Performed by: NURSE PRACTITIONER

## 2019-10-28 RX ADMIN — IOHEXOL 50 ML: 300 INJECTION, SOLUTION INTRAVENOUS at 10:19

## 2019-10-29 ENCOUNTER — TELEPHONE (OUTPATIENT)
Dept: GASTROENTEROLOGY | Facility: CLINIC | Age: 64
End: 2019-10-29

## 2019-10-29 NOTE — TELEPHONE ENCOUNTER
----- Message from VINICIO Taylor sent at 10/28/2019  3:31 PM CDT -----  Please notify the patient that CT of abdomen and pelvis revealed no acute process to explain her abdominal pain and diarrhea.  She does show fatty liver which will need to be followed.

## 2019-10-29 NOTE — TELEPHONE ENCOUNTER
Tried to call pt re: results-was unable to reach her so I left VM asking her to call me back. Per Jaqui-pt needs OV in the next couple of months to discuss fatty liver and complete the workup for it-will further discuss with pt when she calls me back.

## 2019-10-29 NOTE — TELEPHONE ENCOUNTER
PT returned Lynn's call re: CT results. She VU and I transferred pt to [facility] to schedule OV for the fatty liver.

## 2019-11-07 ENCOUNTER — OFFICE VISIT (OUTPATIENT)
Dept: GASTROENTEROLOGY | Facility: CLINIC | Age: 64
End: 2019-11-07

## 2019-11-07 ENCOUNTER — LAB (OUTPATIENT)
Dept: LAB | Facility: HOSPITAL | Age: 64
End: 2019-11-07

## 2019-11-07 VITALS
BODY MASS INDEX: 33.84 KG/M2 | HEART RATE: 73 BPM | SYSTOLIC BLOOD PRESSURE: 124 MMHG | TEMPERATURE: 98.1 F | WEIGHT: 191 LBS | HEIGHT: 63 IN | DIASTOLIC BLOOD PRESSURE: 72 MMHG | OXYGEN SATURATION: 98 %

## 2019-11-07 DIAGNOSIS — Z79.1 ENCOUNTER FOR LONG-TERM (CURRENT) USE OF NSAIDS: ICD-10-CM

## 2019-11-07 DIAGNOSIS — R19.7 DIARRHEA, UNSPECIFIED TYPE: ICD-10-CM

## 2019-11-07 DIAGNOSIS — R93.2 ABNORMAL FINDING ON IMAGING OF LIVER: ICD-10-CM

## 2019-11-07 DIAGNOSIS — K21.9 GASTROESOPHAGEAL REFLUX DISEASE, ESOPHAGITIS PRESENCE NOT SPECIFIED: ICD-10-CM

## 2019-11-07 DIAGNOSIS — R10.9 ABDOMINAL CRAMPING: ICD-10-CM

## 2019-11-07 DIAGNOSIS — R14.2 BELCHING: ICD-10-CM

## 2019-11-07 DIAGNOSIS — R12 HEARTBURN: Primary | ICD-10-CM

## 2019-11-07 PROCEDURE — 80053 COMPREHEN METABOLIC PANEL: CPT | Performed by: NURSE PRACTITIONER

## 2019-11-07 PROCEDURE — 82104 ALPHA-1-ANTITRYPSIN PHENO: CPT | Performed by: NURSE PRACTITIONER

## 2019-11-07 PROCEDURE — 84466 ASSAY OF TRANSFERRIN: CPT | Performed by: NURSE PRACTITIONER

## 2019-11-07 PROCEDURE — 82390 ASSAY OF CERULOPLASMIN: CPT | Performed by: NURSE PRACTITIONER

## 2019-11-07 PROCEDURE — 85025 COMPLETE CBC W/AUTO DIFF WBC: CPT | Performed by: NURSE PRACTITIONER

## 2019-11-07 PROCEDURE — 83516 IMMUNOASSAY NONANTIBODY: CPT | Performed by: NURSE PRACTITIONER

## 2019-11-07 PROCEDURE — 86038 ANTINUCLEAR ANTIBODIES: CPT | Performed by: NURSE PRACTITIONER

## 2019-11-07 PROCEDURE — 82103 ALPHA-1-ANTITRYPSIN TOTAL: CPT | Performed by: NURSE PRACTITIONER

## 2019-11-07 PROCEDURE — 99214 OFFICE O/P EST MOD 30 MIN: CPT | Performed by: NURSE PRACTITIONER

## 2019-11-07 PROCEDURE — 83540 ASSAY OF IRON: CPT | Performed by: NURSE PRACTITIONER

## 2019-11-07 PROCEDURE — 36415 COLL VENOUS BLD VENIPUNCTURE: CPT

## 2019-11-07 PROCEDURE — 80074 ACUTE HEPATITIS PANEL: CPT | Performed by: NURSE PRACTITIONER

## 2019-11-07 PROCEDURE — 82728 ASSAY OF FERRITIN: CPT | Performed by: NURSE PRACTITIONER

## 2019-11-07 RX ORDER — PANTOPRAZOLE SODIUM 40 MG/1
40 TABLET, DELAYED RELEASE ORAL DAILY
Qty: 30 TABLET | Refills: 11 | Status: SHIPPED | OUTPATIENT
Start: 2019-11-07 | End: 2020-03-05 | Stop reason: SDUPTHER

## 2019-11-07 NOTE — H&P (VIEW-ONLY)
Chief Complaint:   Chief Complaint   Patient presents with   • Follow-up     Pt presents today for fatty liver follow up-had CT 10/28/19 that showed fatty liver; Pt also states she is still having abdominal cramping and diarrhea-had to stop taking Bentyl because it made her feel bad; Pt still taking pepto-usually BID-that seems to help          Patient ID: Sada Casey is a 64 y.o. female     History of Present Illness: This is a very pleasant 64-year-old female who is here to follow-up today for abnormal findings on imaging of liver.  The patient is also following up for abdominal cramping and diarrhea.    On 9/30/2019 the patient came in with complaints of abdominal cramping and diarrhea.  She had been started on Bentyl which she stated helped some at the time but then began to cause her to feel dizzy and drowsy and therefore she stopped taking it.  She states that she has been using Pepto-Bismol for her abdominal cramping. The patient's last colonoscopy was performed on 2/1/2019 with findings of diverticulosis only.  The patient has not been treated with any antibiotics.  Gastrointestinal panel was negative for pathogens.  Discussed possible gallbladder ultrasound and HIDA scan. Last EGD was 2011 small HH. She is having heartburn and belching. She states she has been taking NSAIDs for over a year along with aspirin 325 mg daily and Pepto-Bismol.  After last office visit the patient bought Activia and started taking that which she stated stopped her diarrhea and cramping.  She then began to have those complaints again and we went forward with the CT of abdomen and pelvis.    The patient denies any nausea, vomiting, epigastric pain, dysphagia, or hematemesis.  The patient denies any fever or chills.  Denies any melena or hematochezia.  Denies any unintentional weight loss or loss of appetite.    Patient was also found to have abnormal imaging of liver suggesting hepatic steatosis.  She states that she has lost  "about 35 pounds in 1 year using the keto diet but has recently stopped because she was unsure if this is what is causing all of her \"stomach issues.\".     Study Result     Exam: CT ABDOMEN PELVIS WO CONTRAST-     Indication: Lower abdominal pain, cramping, diarrhea     Comparison: None       IMPRESSION:     No findings to explain abdominal pain.     Hepatic steatosis.  This report was finalized on 10/28/2019 10:31 by Dr. Blas Lewis MD.    Past Medical History:   Diagnosis Date   • Anxiety    • Arthritis    • DVT (deep venous thrombosis) (CMS/HCC)     left leg, about 6 yrs   • Eczema    • GERD (gastroesophageal reflux disease)    • Hyperlipidemia    • Hypertension    • Insomnia    • Right leg numbness    • Sleep apnea     cpap   • Stress incontinence    • TIA (transient ischemic attack)        Past Surgical History:   Procedure Laterality Date   • BREAST LUMPECTOMY Left    • CATARACT EXTRACTION WITH INTRAOCULAR LENS IMPLANT Bilateral    • COLONOSCOPY  12/15/2011    Diverticulosis; Repeat 7 years   • COLONOSCOPY N/A 2/1/2019    Diverticulosis in the left colon; The examination was otherwise normal on direct and retroflexion views; No specimens collected; Repeat 10 years   • ENDOSCOPY  12/15/2011    HH   • HYSTERECTOMY     • STEROID INJECTION Bilateral 8/21/2018    Procedure: 2, 3 TARSOMETATARSAL JOINT INJECTION WITH FLUROSCOPIC GUIDANCE - BILATERAL FEET;  Surgeon: Senthil Curtis DPM;  Location:  [facility];  Service: Podiatry   • STEROID INJECTION Bilateral 1/15/2019    Procedure: 2, 3, TARSOMETATARSAL JOINT INJECTION WITH FLUROSCOPIC GUIDANCE- BILATERAL FEET;  Surgeon: Senthil Curtis DPM;  Location:  [facility];  Service: Podiatry         Current Outpatient Medications:   •  aspirin 325 MG tablet, Take 325 mg by mouth Daily., Disp: , Rfl:   •  bismuth subsalicylate (PEPTO BISMOL) 262 MG/15ML suspension, Take 15 mL by mouth 2 (Two) Times a Day As Needed for Indigestion., Disp: , Rfl:   •  " clotrimazole-betamethasone (LOTRISONE) 1-0.05 % cream, Apply  topically 2 (Two) Times a Day. (Patient taking differently: Apply  topically to the appropriate area as directed 2 (Two) Times a Day. As needed), Disp: 15 g, Rfl: 0  •  diclofenac (VOLTAREN) 75 MG EC tablet, Take 1 tablet by mouth Daily., Disp: , Rfl:   •  DULoxetine (CYMBALTA) 30 MG capsule, Take 60 mg by mouth Daily., Disp: , Rfl:   •  hydrochlorothiazide (MICROZIDE) 12.5 MG capsule, Take 12.5 mg by mouth every morning , Disp: , Rfl:   •  irbesartan (AVAPRO) 150 MG tablet, Take 150 mg by mouth daily , Disp: , Rfl:   •  metoprolol succinate XL (TOPROL-XL) 100 MG 24 hr tablet, Take 100 mg by mouth daily , Disp: , Rfl:   •  pantoprazole (PROTONIX) 40 MG EC tablet, Take 1 tablet by mouth Daily., Disp: 30 tablet, Rfl: 11    Allergies   Allergen Reactions   • Acetaminophen Rash   • Naprosyn [Naproxen] Rash   • Nyquil Multi-Symptom [Pseudoeph-Doxylamine-Dm-Apap] Rash       Social History     Socioeconomic History   • Marital status:      Spouse name: Not on file   • Number of children: Not on file   • Years of education: Not on file   • Highest education level: Not on file   Tobacco Use   • Smoking status: Never Smoker   • Smokeless tobacco: Never Used   Substance and Sexual Activity   • Alcohol use: Yes     Comment: Occasional   • Drug use: No   • Sexual activity: Defer       Family History   Problem Relation Age of Onset   • No Known Problems Father    • Coronary artery disease Mother    • No Known Problems Sister    • No Known Problems Sister    • No Known Problems Sister    • No Known Problems Sister    • Colon cancer Neg Hx    • Colon polyps Neg Hx    • Esophageal cancer Neg Hx    • Liver cancer Neg Hx    • Liver disease Neg Hx    • Rectal cancer Neg Hx    • Stomach cancer Neg Hx        Vitals:    11/07/19 1322   BP: 124/72   BP Location: Left arm   Patient Position: Sitting   Cuff Size: Adult   Pulse: 73   Temp: 98.1 °F (36.7 °C)   TempSrc:  "Tympanic   SpO2: 98%   Weight: 86.6 kg (191 lb)   Height: 160 cm (63\")       Review of Systems:    General:    Present -feeling well   Skin:    Not Present-Rash   HEENT:     Not Present-Acute visual changes or Acute hearing changes   Neck :    Not Present- swollen glands   Genitourinary:      Not Present- burning, frequency, urgency hematuria, dysuria,   Cardiovascular:   Not Present-chest pain, palpitations, or pressure   Respiratory:   Not Present- shortness of breath or cough   Gastrointestinal:  Musculoskeletal:  Neurological:  Psychiatric:   Present as mentioned in the HP    Not Present. Recent gait disturbances.    Not Present-Seizures and weakness in extremities.    Not Present- Anxiety or Depression.       Physical Exam:    General Appearance:    Alert, cooperative, in no acute distress   Psych:    Mood appropriate    Eyes:          conjunctivae and sclerae normal, no   icterus, no pallor   ENMT:    Ears appear intact with no abnormalities noted oral mucosa moist   Neck:   No adenopathy, supple, trachea midline, no thyromegaly, no   carotid bruit, no JVD    Cardiovascular:    Regular rhythm and normal rate, normal S1 and S2, no            murmur, no gallop, no rub, no click   Gastrointestinal:     Inspection normal.  Normal bowel sounds, no masses, no organomegaly, soft round non-tender, non-distended, no guarding, no rebound or tenderness. No hepatosplenomegaly.   Skin:   No bleeding, bruising or rash   Neurologic:   nonfocal       Lab Results   Component Value Date    WBC 8.78 01/14/2019    WBC 8.24 08/16/2018    HGB 13.1 01/14/2019    HGB 12.4 08/16/2018    HCT 41.4 01/14/2019    HCT 39.0 08/16/2018     01/14/2019     08/16/2018        Lab Results   Component Value Date     01/14/2019     08/16/2018    K 4.0 01/14/2019    K 3.5 08/16/2018     01/14/2019     08/16/2018    CO2 28.0 01/14/2019    CO2 29.0 08/16/2018    BUN 11 01/14/2019    BUN 14 08/16/2018    CREATININE " "0.49 (L) 01/14/2019    CREATININE 0.65 08/16/2018    GLUCOSE 150 (H) 01/14/2019    GLUCOSE 161 (H) 08/16/2018       No results found for: INR    Body mass index is 33.83 kg/m². Patient's Body mass index is 33.83 kg/m². BMI is above normal parameters. Recommendations include: nutrition counseling.    Assessment and Plan:  Assessment/Plan   Sada was seen today for follow-up.    Diagnoses and all orders for this visit:    Heartburn  Comments:  Initiate Protonix.  Schedule EGD  Orders:  -     Case Request; Standing  -     Case Request    Abnormal finding on imaging of liver  Comments:  We will schedule liver ultrasound as well as labs noted below.  Will call when all are resulted and schedule follow-up  Orders:  -     CBC & Differential; Future  -     Comprehensive Metabolic Panel; Future  -     PARTH; Future  -     Mitochondrial Antibodies, M2; Future  -     Anti-Smooth Muscle Antibody Titer; Future  -     Iron Profile; Future  -     Ferritin; Future  -     Ceruloplasmin; Future  -     Alpha - 1 - Antitrypsin; Future  -     Alpha - 1 - Antitrypsin Phenotype; Future  -     Hepatitis Panel, Acute; Future  -     US Elastography Parenchyma; Future  -     US Liver; Future    Abdominal cramping    Diarrhea, unspecified type  Comments:  Start Imodium as directed    Belching  -     Case Request; Standing  -     Case Request    Gastroesophageal reflux disease, esophagitis presence not specified  -     Case Request; Standing  -     Case Request    Encounter for long-term (current) use of NSAIDs  Comments:  Patient instructed to discontinue all NSAID use and use Tylenol only.  She tells me \"I have to have something for my arthritis.\"  Orders:  -     Case Request; Standing  -     Case Request    Other orders  -     pantoprazole (PROTONIX) 40 MG EC tablet; Take 1 tablet by mouth Daily.  -     Follow Anesthesia Guidelines / Standing Orders; Future  -     Obtain Informed Consent; Future  -     Implement Anesthesia Orders Day of " Procedure; Standing  -     Obtain Informed Consent; Standing        The risks, benefits, and alternatives of endoscopy were reviewed with the patient today.  Risks including perforation, with or without dilation, possibly requiring surgery.  Additional risks include risk of bleeding.  There is also the risk of a drug reaction or problems with anesthesia.  This will be discussed with the further by the anesthesia team on the day of the procedure. The benefits include the diagnosis and management of disease of the upper digestive tract.  Alternatives to endoscopy include upper GI series, laboratory testing, radiographic evaluation, or no intervention.  The patient verbalizes understanding and agrees.       There are no Patient Instructions on file for this visit.    Next follow-up appointment          EMR Dragon/Transcription disclaimer:  Much of this encounter note is an electronic transcription/translation of spoken language to printed text. The electronic translation of spoken language may permit erroneous, or at times, nonsensical words or phrases to be inadvertently transcribed; although I have reviewed the note for such errors, some may still exist.

## 2019-11-08 PROBLEM — Z79.1 ENCOUNTER FOR LONG-TERM (CURRENT) USE OF NSAIDS: Status: ACTIVE | Noted: 2019-11-08

## 2019-11-08 PROBLEM — K21.9 GASTROESOPHAGEAL REFLUX DISEASE: Status: ACTIVE | Noted: 2019-11-08

## 2019-11-08 PROBLEM — R14.2 BELCHING: Status: ACTIVE | Noted: 2019-11-08

## 2019-11-08 PROBLEM — R12 HEARTBURN: Status: ACTIVE | Noted: 2019-11-08

## 2019-11-08 LAB
ACTIN IGG SERPL-ACNC: 5 UNITS (ref 0–19)
ALBUMIN SERPL-MCNC: 3.8 G/DL (ref 3.5–5.2)
ALBUMIN/GLOB SERPL: 1.2 G/DL
ALP SERPL-CCNC: 77 U/L (ref 39–117)
ALT SERPL W P-5'-P-CCNC: 32 U/L (ref 1–33)
ANA SER QL: NEGATIVE
ANION GAP SERPL CALCULATED.3IONS-SCNC: 12.5 MMOL/L (ref 5–15)
AST SERPL-CCNC: 26 U/L (ref 1–32)
BASOPHILS # BLD AUTO: 0.04 10*3/MM3 (ref 0–0.2)
BASOPHILS NFR BLD AUTO: 0.5 % (ref 0–1.5)
BILIRUB SERPL-MCNC: 0.3 MG/DL (ref 0.2–1.2)
BUN BLD-MCNC: 11 MG/DL (ref 8–23)
BUN/CREAT SERPL: 20 (ref 7–25)
CALCIUM SPEC-SCNC: 9 MG/DL (ref 8.6–10.5)
CERULOPLASMIN SERPL-MCNC: 27 MG/DL (ref 19–39)
CHLORIDE SERPL-SCNC: 103 MMOL/L (ref 98–107)
CO2 SERPL-SCNC: 26.5 MMOL/L (ref 22–29)
CREAT BLD-MCNC: 0.55 MG/DL (ref 0.57–1)
DEPRECATED MITOCHONDRIA M2 IGG SER-ACNC: <20 UNITS (ref 0–20)
DEPRECATED RDW RBC AUTO: 39.5 FL (ref 37–54)
EOSINOPHIL # BLD AUTO: 0.17 10*3/MM3 (ref 0–0.4)
EOSINOPHIL NFR BLD AUTO: 2.3 % (ref 0.3–6.2)
ERYTHROCYTE [DISTWIDTH] IN BLOOD BY AUTOMATED COUNT: 13.1 % (ref 12.3–15.4)
FERRITIN SERPL-MCNC: 138 NG/ML (ref 13–150)
GFR SERPL CREATININE-BSD FRML MDRD: 111 ML/MIN/1.73
GLOBULIN UR ELPH-MCNC: 3.3 GM/DL
GLUCOSE BLD-MCNC: 88 MG/DL (ref 65–99)
HAV IGM SERPL QL IA: NORMAL
HBV CORE IGM SERPL QL IA: NORMAL
HBV SURFACE AG SERPL QL IA: NORMAL
HCT VFR BLD AUTO: 39.6 % (ref 34–46.6)
HCV AB SER DONR QL: NORMAL
HGB BLD-MCNC: 13.5 G/DL (ref 12–15.9)
IMM GRANULOCYTES # BLD AUTO: 0.03 10*3/MM3 (ref 0–0.05)
IMM GRANULOCYTES NFR BLD AUTO: 0.4 % (ref 0–0.5)
IRON 24H UR-MRATE: 48 MCG/DL (ref 37–145)
IRON SATN MFR SERPL: 14 % (ref 20–50)
LYMPHOCYTES # BLD AUTO: 2.71 10*3/MM3 (ref 0.7–3.1)
LYMPHOCYTES NFR BLD AUTO: 36.5 % (ref 19.6–45.3)
MCH RBC QN AUTO: 28.5 PG (ref 26.6–33)
MCHC RBC AUTO-ENTMCNC: 34.1 G/DL (ref 31.5–35.7)
MCV RBC AUTO: 83.7 FL (ref 79–97)
MONOCYTES # BLD AUTO: 0.57 10*3/MM3 (ref 0.1–0.9)
MONOCYTES NFR BLD AUTO: 7.7 % (ref 5–12)
NEUTROPHILS # BLD AUTO: 3.91 10*3/MM3 (ref 1.7–7)
NEUTROPHILS NFR BLD AUTO: 52.6 % (ref 42.7–76)
NRBC BLD AUTO-RTO: 0 /100 WBC (ref 0–0.2)
PLATELET # BLD AUTO: 290 10*3/MM3 (ref 140–450)
PMV BLD AUTO: 9.8 FL (ref 6–12)
POTASSIUM BLD-SCNC: 3.5 MMOL/L (ref 3.5–5.2)
PROT SERPL-MCNC: 7.1 G/DL (ref 6–8.5)
RBC # BLD AUTO: 4.73 10*6/MM3 (ref 3.77–5.28)
SODIUM BLD-SCNC: 142 MMOL/L (ref 136–145)
TIBC SERPL-MCNC: 335 MCG/DL (ref 298–536)
TRANSFERRIN SERPL-MCNC: 225 MG/DL (ref 200–360)
WBC NRBC COR # BLD: 7.43 10*3/MM3 (ref 3.4–10.8)

## 2019-11-11 ENCOUNTER — OFFICE VISIT (OUTPATIENT)
Dept: SURGERY | Age: 64
End: 2019-11-11
Payer: MEDICARE

## 2019-11-11 VITALS
HEIGHT: 63 IN | BODY MASS INDEX: 35.08 KG/M2 | WEIGHT: 198 LBS | HEART RATE: 70 BPM | SYSTOLIC BLOOD PRESSURE: 139 MMHG | DIASTOLIC BLOOD PRESSURE: 78 MMHG

## 2019-11-11 DIAGNOSIS — R92.0 MICROCALCIFICATION OF BREAST: Primary | ICD-10-CM

## 2019-11-11 PROCEDURE — 1090F PRES/ABSN URINE INCON ASSESS: CPT | Performed by: PHYSICIAN ASSISTANT

## 2019-11-11 PROCEDURE — G8417 CALC BMI ABV UP PARAM F/U: HCPCS | Performed by: PHYSICIAN ASSISTANT

## 2019-11-11 PROCEDURE — G8400 PT W/DXA NO RESULTS DOC: HCPCS | Performed by: PHYSICIAN ASSISTANT

## 2019-11-11 PROCEDURE — G8484 FLU IMMUNIZE NO ADMIN: HCPCS | Performed by: PHYSICIAN ASSISTANT

## 2019-11-11 PROCEDURE — 99203 OFFICE O/P NEW LOW 30 MIN: CPT | Performed by: PHYSICIAN ASSISTANT

## 2019-11-11 PROCEDURE — G8427 DOCREV CUR MEDS BY ELIG CLIN: HCPCS | Performed by: PHYSICIAN ASSISTANT

## 2019-11-11 PROCEDURE — 3017F COLORECTAL CA SCREEN DOC REV: CPT | Performed by: PHYSICIAN ASSISTANT

## 2019-11-11 PROCEDURE — 4040F PNEUMOC VAC/ADMIN/RCVD: CPT | Performed by: PHYSICIAN ASSISTANT

## 2019-11-11 PROCEDURE — 1123F ACP DISCUSS/DSCN MKR DOCD: CPT | Performed by: PHYSICIAN ASSISTANT

## 2019-11-11 PROCEDURE — 1036F TOBACCO NON-USER: CPT | Performed by: PHYSICIAN ASSISTANT

## 2019-11-11 RX ORDER — DULOXETIN HYDROCHLORIDE 30 MG/1
60 CAPSULE, DELAYED RELEASE ORAL DAILY
COMMUNITY

## 2019-11-11 RX ORDER — PANTOPRAZOLE SODIUM 40 MG/1
40 TABLET, DELAYED RELEASE ORAL DAILY
COMMUNITY
Start: 2019-11-07

## 2019-11-12 ENCOUNTER — APPOINTMENT (OUTPATIENT)
Dept: ULTRASOUND IMAGING | Facility: HOSPITAL | Age: 64
End: 2019-11-12

## 2019-11-13 ENCOUNTER — APPOINTMENT (OUTPATIENT)
Dept: ULTRASOUND IMAGING | Facility: HOSPITAL | Age: 64
End: 2019-11-13

## 2019-11-14 ENCOUNTER — TELEPHONE (OUTPATIENT)
Dept: GASTROENTEROLOGY | Facility: CLINIC | Age: 64
End: 2019-11-14

## 2019-11-14 LAB
A1AT SERPL-MCNC: 149 MG/DL (ref 101–187)
PHENOTYPE: NORMAL

## 2019-11-14 NOTE — TELEPHONE ENCOUNTER
Called and spoke to pt re: results-she VU. Pt is scheduled for liver US tomorrow-advised her we would let her know those results after they came back. I faxed copy of labs to PCP-advised her if they had any further recommendations, then Dr. Hummel's office would contact her.  Pt advised to call me back with any further questions/problems.

## 2019-11-14 NOTE — TELEPHONE ENCOUNTER
----- Message from VINICIO Taylro sent at 11/14/2019  3:46 PM CST -----  Please notify the patient that liver work-up was found to be essentially unremarkable.  She is still due to have her liver ultrasound performed.  Most likely as we discussed this will just be fatty liver as all labs were normal.  She does show a low iron saturation.  Please send the result of the iron profile to her PCP.

## 2019-11-15 ENCOUNTER — HOSPITAL ENCOUNTER (OUTPATIENT)
Dept: ULTRASOUND IMAGING | Facility: HOSPITAL | Age: 64
Discharge: HOME | End: 2019-11-15

## 2019-11-15 ENCOUNTER — HOSPITAL ENCOUNTER (OUTPATIENT)
Dept: ULTRASOUND IMAGING | Facility: HOSPITAL | Age: 64
Discharge: HOME | End: 2019-11-15
Admitting: NURSE PRACTITIONER

## 2019-11-15 DIAGNOSIS — R93.2 ABNORMAL FINDING ON IMAGING OF LIVER: ICD-10-CM

## 2019-11-15 PROCEDURE — 76705 ECHO EXAM OF ABDOMEN: CPT

## 2019-11-15 PROCEDURE — 76981 USE PARENCHYMA: CPT

## 2019-11-18 ENCOUNTER — HOSPITAL ENCOUNTER (OUTPATIENT)
Facility: HOSPITAL | Age: 64
Setting detail: HOSPITAL OUTPATIENT SURGERY
Discharge: HOME | End: 2019-11-18
Attending: INTERNAL MEDICINE | Admitting: INTERNAL MEDICINE

## 2019-11-18 ENCOUNTER — ANESTHESIA EVENT (OUTPATIENT)
Dept: GASTROENTEROLOGY | Facility: HOSPITAL | Age: 64
End: 2019-11-18

## 2019-11-18 ENCOUNTER — ANESTHESIA (OUTPATIENT)
Dept: GASTROENTEROLOGY | Facility: HOSPITAL | Age: 64
End: 2019-11-18

## 2019-11-18 VITALS
OXYGEN SATURATION: 98 % | RESPIRATION RATE: 19 BRPM | HEIGHT: 63 IN | HEART RATE: 58 BPM | BODY MASS INDEX: 34.2 KG/M2 | SYSTOLIC BLOOD PRESSURE: 121 MMHG | TEMPERATURE: 97.3 F | WEIGHT: 193 LBS | DIASTOLIC BLOOD PRESSURE: 66 MMHG

## 2019-11-18 DIAGNOSIS — Z79.1 ENCOUNTER FOR LONG-TERM (CURRENT) USE OF NSAIDS: ICD-10-CM

## 2019-11-18 DIAGNOSIS — K21.9 GASTROESOPHAGEAL REFLUX DISEASE, ESOPHAGITIS PRESENCE NOT SPECIFIED: ICD-10-CM

## 2019-11-18 DIAGNOSIS — R14.2 BELCHING: ICD-10-CM

## 2019-11-18 DIAGNOSIS — R12 HEARTBURN: ICD-10-CM

## 2019-11-18 PROCEDURE — 88305 TISSUE EXAM BY PATHOLOGIST: CPT | Performed by: INTERNAL MEDICINE

## 2019-11-18 PROCEDURE — 88342 IMHCHEM/IMCYTCHM 1ST ANTB: CPT | Performed by: INTERNAL MEDICINE

## 2019-11-18 PROCEDURE — 25010000002 PROPOFOL 10 MG/ML EMULSION: Performed by: NURSE ANESTHETIST, CERTIFIED REGISTERED

## 2019-11-18 PROCEDURE — 43239 EGD BIOPSY SINGLE/MULTIPLE: CPT | Performed by: INTERNAL MEDICINE

## 2019-11-18 RX ORDER — SODIUM CHLORIDE 0.9 % (FLUSH) 0.9 %
10 SYRINGE (ML) INJECTION AS NEEDED
Status: DISCONTINUED | OUTPATIENT
Start: 2019-11-18 | End: 2019-11-18 | Stop reason: HOSPADM

## 2019-11-18 RX ORDER — PROPOFOL 10 MG/ML
VIAL (ML) INTRAVENOUS AS NEEDED
Status: DISCONTINUED | OUTPATIENT
Start: 2019-11-18 | End: 2019-11-18 | Stop reason: SURG

## 2019-11-18 RX ORDER — SODIUM CHLORIDE 9 MG/ML
500 INJECTION, SOLUTION INTRAVENOUS CONTINUOUS PRN
Status: DISCONTINUED | OUTPATIENT
Start: 2019-11-18 | End: 2019-11-18 | Stop reason: HOSPADM

## 2019-11-18 RX ADMIN — SODIUM CHLORIDE 500 ML: 9 INJECTION, SOLUTION INTRAVENOUS at 11:16

## 2019-11-18 RX ADMIN — LIDOCAINE HYDROCHLORIDE 100 MG: 20 INJECTION, SOLUTION INTRAVENOUS at 13:23

## 2019-11-18 RX ADMIN — PROPOFOL 50 MG: 10 INJECTION, EMULSION INTRAVENOUS at 13:28

## 2019-11-18 RX ADMIN — PROPOFOL 50 MG: 10 INJECTION, EMULSION INTRAVENOUS at 13:26

## 2019-11-18 RX ADMIN — PROPOFOL 100 MG: 10 INJECTION, EMULSION INTRAVENOUS at 13:23

## 2019-11-18 NOTE — ANESTHESIA POSTPROCEDURE EVALUATION
Patient: Sada Casey    Procedure Summary     Date:  11/18/19 Room / Location:   [facility] /  [facility]    Anesthesia Start:  1322 Anesthesia Stop:  1333    Procedure:  ESOPHAGOGASTRODUODENOSCOPY WITH ANESTHESIA (N/A ) Diagnosis:       Heartburn      Belching      Gastroesophageal reflux disease, esophagitis presence not specified      Encounter for long-term (current) use of NSAIDs      (Heartburn [R12])      (Belching [R14.2])      (Gastroesophageal reflux disease, esophagitis presence not specified [K21.9])      (Encounter for long-term (current) use of NSAIDs [Z79.1])    Surgeon:  Shawnee Marin MD Provider:  YOLA Landaverde CRNA    Anesthesia Type:  MAC ASA Status:  2          Anesthesia Type: MAC  Last vitals  BP   147/83 (11/18/19 1054)   Temp   97.3 °F (36.3 °C) (11/18/19 1054)   Pulse   68 (11/18/19 1054)   Resp   19 (11/18/19 1054)     SpO2   94 % (11/18/19 1054)     Post Anesthesia Care and Evaluation    Patient location during evaluation: PACU  Patient participation: complete - patient participated  Level of consciousness: awake and alert  Pain score: 0  Pain management: adequate  Airway patency: patent  Anesthetic complications: No anesthetic complications    Cardiovascular status: acceptable and stable  Respiratory status: acceptable and unassisted  Hydration status: acceptable

## 2019-11-18 NOTE — ANESTHESIA PREPROCEDURE EVALUATION
Anesthesia Evaluation     Patient summary reviewed and Nursing notes reviewed   no history of anesthetic complications:  NPO Solid Status: > 8 hours  NPO Liquid Status: > 8 hours           Airway   Mallampati: II  TM distance: >3 FB  Neck ROM: full  No difficulty expected  Dental - normal exam     Pulmonary    (+) sleep apnea on CPAP,   Cardiovascular   Exercise tolerance: good (4-7 METS)    (+) hypertension, DVT resolved, hyperlipidemia,       Neuro/Psych  (+) TIA (possible, right periorbital numbness), psychiatric history Anxiety,     (-) seizures, CVA  GI/Hepatic/Renal/Endo    (+) obesity,  GERD,    (-) liver disease, no renal disease, diabetes    Musculoskeletal     Abdominal    Substance History - negative use     OB/GYN negative ob/gyn ROS         Other   arthritis,                        Anesthesia Plan    ASA 2     MAC     intravenous induction     Anesthetic plan, all risks, benefits, and alternatives have been provided, discussed and informed consent has been obtained with: patient.

## 2019-11-19 LAB
LAB AP CASE REPORT: NORMAL
LAB AP CLINICAL INFORMATION: NORMAL
LAB AP DIAGNOSIS COMMENT: NORMAL
PATH REPORT.FINAL DX SPEC: NORMAL
PATH REPORT.GROSS SPEC: NORMAL

## 2019-11-20 ENCOUNTER — TELEPHONE (OUTPATIENT)
Dept: GASTROENTEROLOGY | Facility: CLINIC | Age: 64
End: 2019-11-20

## 2019-11-20 ENCOUNTER — HOSPITAL ENCOUNTER (OUTPATIENT)
Dept: WOMENS IMAGING | Age: 64
Discharge: HOME | End: 2019-11-20
Payer: MEDICARE

## 2019-11-20 DIAGNOSIS — R92.0 MICROCALCIFICATIONS OF THE BREAST: ICD-10-CM

## 2019-11-20 DIAGNOSIS — K29.80 LYMPHOCYTIC DUODENITIS: Primary | ICD-10-CM

## 2019-11-20 DIAGNOSIS — R92.0 MICROCALCIFICATION OF BREAST: ICD-10-CM

## 2019-11-20 PROCEDURE — 19081 BX BREAST 1ST LESION STRTCTC: CPT | Performed by: SURGERY

## 2019-11-20 PROCEDURE — 2720000010 MAM STEREO BREAST BX W LOC DEVICE 1ST LESION RIGHT

## 2019-11-20 PROCEDURE — 88305 TISSUE EXAM BY PATHOLOGIST: CPT

## 2019-11-20 PROCEDURE — 77065 DX MAMMO INCL CAD UNI: CPT

## 2019-11-20 PROCEDURE — 88361 TUMOR IMMUNOHISTOCHEM/COMPUT: CPT

## 2019-11-20 NOTE — TELEPHONE ENCOUNTER
Please let her know that her small bowel bxs showed nonspecific inflammation.  I'd like to send off some labs to check for celiac disease as this is on the top of my list.  She can have them done anytime at her convenience.    Shawnee Marin MD

## 2019-11-22 ENCOUNTER — LAB (OUTPATIENT)
Dept: LAB | Facility: HOSPITAL | Age: 64
End: 2019-11-22

## 2019-11-22 DIAGNOSIS — K29.80 LYMPHOCYTIC DUODENITIS: ICD-10-CM

## 2019-11-22 LAB — IGA1 MFR SER: 357 MG/DL (ref 70–400)

## 2019-11-22 PROCEDURE — 36415 COLL VENOUS BLD VENIPUNCTURE: CPT

## 2019-11-22 PROCEDURE — 82784 ASSAY IGA/IGD/IGG/IGM EACH: CPT | Performed by: INTERNAL MEDICINE

## 2019-11-22 PROCEDURE — 83516 IMMUNOASSAY NONANTIBODY: CPT | Performed by: INTERNAL MEDICINE

## 2019-11-23 LAB — TTG IGA SER-ACNC: <2 U/ML (ref 0–3)

## 2019-11-25 ENCOUNTER — TELEPHONE (OUTPATIENT)
Dept: SURGERY | Age: 64
End: 2019-11-25

## 2019-11-25 DIAGNOSIS — D05.11 BREAST NEOPLASM, TIS (DCIS), RIGHT: Primary | ICD-10-CM

## 2019-11-26 ENCOUNTER — TELEPHONE (OUTPATIENT)
Dept: GASTROENTEROLOGY | Facility: CLINIC | Age: 64
End: 2019-11-26

## 2019-11-26 NOTE — TELEPHONE ENCOUNTER
Pt called and requesting lab results she had done on Friday. I told pt I would send you a message and call her back as soon as I heard back from you.

## 2019-11-27 ENCOUNTER — TELEPHONE (OUTPATIENT)
Dept: OTHER | Age: 64
End: 2019-11-27

## 2019-11-27 LAB
CONV COMMENT: NORMAL
DEPRECATED HLA-DQ2 QL: POSITIVE
HLA-DQ8 QL: NEGATIVE
Lab: NORMAL

## 2019-11-27 NOTE — TELEPHONE ENCOUNTER
They aren't all back.  So far neg for celiac, but the genetic test is pending.    Shawnee Marin MD

## 2019-12-02 PROBLEM — K90.41 GLUTEN INTOLERANCE: Status: ACTIVE | Noted: 2019-12-02

## 2019-12-12 ENCOUNTER — HOSPITAL ENCOUNTER (OUTPATIENT)
Dept: MRI IMAGING | Age: 64
Discharge: HOME | End: 2019-12-12
Payer: MEDICARE

## 2019-12-12 ENCOUNTER — APPOINTMENT (OUTPATIENT)
Dept: WOMENS IMAGING | Age: 64
End: 2019-12-12
Payer: MEDICARE

## 2019-12-12 DIAGNOSIS — D05.11 BREAST NEOPLASM, TIS (DCIS), RIGHT: ICD-10-CM

## 2019-12-12 PROCEDURE — A9577 INJ MULTIHANCE: HCPCS | Performed by: PHYSICIAN ASSISTANT

## 2019-12-12 PROCEDURE — 6360000004 HC RX CONTRAST MEDICATION: Performed by: PHYSICIAN ASSISTANT

## 2019-12-12 PROCEDURE — 77049 MRI BREAST C-+ W/CAD BI: CPT

## 2019-12-12 RX ADMIN — GADOBENATE DIMEGLUMINE 18 ML: 529 INJECTION, SOLUTION INTRAVENOUS at 12:56

## 2019-12-17 ENCOUNTER — TELEPHONE (OUTPATIENT)
Dept: SURGERY | Age: 64
End: 2019-12-17

## 2019-12-23 ENCOUNTER — HOSPITAL ENCOUNTER (OUTPATIENT)
Dept: WOMENS IMAGING | Age: 64
Discharge: HOME | End: 2019-12-23
Payer: MEDICARE

## 2019-12-23 ENCOUNTER — INITIAL CONSULT (OUTPATIENT)
Dept: SURGERY | Age: 64
End: 2019-12-23
Payer: MEDICARE

## 2019-12-23 DIAGNOSIS — D05.11 BREAST NEOPLASM, TIS (DCIS), RIGHT: ICD-10-CM

## 2019-12-23 DIAGNOSIS — D05.11 DUCTAL CARCINOMA IN SITU (DCIS) OF RIGHT BREAST: ICD-10-CM

## 2019-12-23 PROCEDURE — 3017F COLORECTAL CA SCREEN DOC REV: CPT | Performed by: SURGERY

## 2019-12-23 PROCEDURE — G8484 FLU IMMUNIZE NO ADMIN: HCPCS | Performed by: SURGERY

## 2019-12-23 PROCEDURE — 1036F TOBACCO NON-USER: CPT | Performed by: SURGERY

## 2019-12-23 PROCEDURE — G8417 CALC BMI ABV UP PARAM F/U: HCPCS | Performed by: SURGERY

## 2019-12-23 PROCEDURE — G8400 PT W/DXA NO RESULTS DOC: HCPCS | Performed by: SURGERY

## 2019-12-23 PROCEDURE — 1090F PRES/ABSN URINE INCON ASSESS: CPT | Performed by: SURGERY

## 2019-12-23 PROCEDURE — 99215 OFFICE O/P EST HI 40 MIN: CPT | Performed by: SURGERY

## 2019-12-23 PROCEDURE — 1123F ACP DISCUSS/DSCN MKR DOCD: CPT | Performed by: SURGERY

## 2019-12-23 PROCEDURE — 4040F PNEUMOC VAC/ADMIN/RCVD: CPT | Performed by: SURGERY

## 2019-12-23 PROCEDURE — G8428 CUR MEDS NOT DOCUMENT: HCPCS | Performed by: SURGERY

## 2019-12-23 PROCEDURE — 99354 PR PROLONGED SVC OUTPATIENT SETTING 1ST HOUR: CPT | Performed by: SURGERY

## 2019-12-27 ENCOUNTER — HOSPITAL ENCOUNTER (OUTPATIENT)
Dept: PREADMISSION TESTING | Age: 64
Discharge: HOME | End: 2019-12-31
Payer: MEDICARE

## 2019-12-27 VITALS — BODY MASS INDEX: 34.73 KG/M2 | WEIGHT: 196 LBS | HEIGHT: 63 IN

## 2019-12-27 LAB
ANION GAP SERPL CALCULATED.3IONS-SCNC: 17 MMOL/L (ref 7–19)
BASOPHILS ABSOLUTE: 0.1 K/UL (ref 0–0.2)
BASOPHILS RELATIVE PERCENT: 0.9 % (ref 0–1)
BUN BLDV-MCNC: 10 MG/DL (ref 8–23)
CALCIUM SERPL-MCNC: 9 MG/DL (ref 8.8–10.2)
CHLORIDE BLD-SCNC: 103 MMOL/L (ref 98–111)
CO2: 22 MMOL/L (ref 22–29)
CREAT SERPL-MCNC: 0.5 MG/DL (ref 0.5–0.9)
EOSINOPHILS ABSOLUTE: 0.3 K/UL (ref 0–0.6)
EOSINOPHILS RELATIVE PERCENT: 3.2 % (ref 0–5)
GFR NON-AFRICAN AMERICAN: >60
GLUCOSE BLD-MCNC: 139 MG/DL (ref 74–109)
HCT VFR BLD CALC: 40.6 % (ref 37–47)
HEMOGLOBIN: 12.8 G/DL (ref 12–16)
IMMATURE GRANULOCYTES #: 0.1 K/UL
LYMPHOCYTES ABSOLUTE: 3.4 K/UL (ref 1.1–4.5)
LYMPHOCYTES RELATIVE PERCENT: 36.8 % (ref 20–40)
MCH RBC QN AUTO: 27.7 PG (ref 27–31)
MCHC RBC AUTO-ENTMCNC: 31.5 G/DL (ref 33–37)
MCV RBC AUTO: 87.9 FL (ref 81–99)
MONOCYTES ABSOLUTE: 0.7 K/UL (ref 0–0.9)
MONOCYTES RELATIVE PERCENT: 8.1 % (ref 0–10)
NEUTROPHILS ABSOLUTE: 4.6 K/UL (ref 1.5–7.5)
NEUTROPHILS RELATIVE PERCENT: 49.8 % (ref 50–65)
PDW BLD-RTO: 13.2 % (ref 11.5–14.5)
PLATELET # BLD: 285 K/UL (ref 130–400)
PMV BLD AUTO: 9.4 FL (ref 9.4–12.3)
POTASSIUM SERPL-SCNC: 3.5 MMOL/L (ref 3.5–5)
RBC # BLD: 4.62 M/UL (ref 4.2–5.4)
SODIUM BLD-SCNC: 142 MMOL/L (ref 136–145)
WBC # BLD: 9.2 K/UL (ref 4.8–10.8)

## 2019-12-27 PROCEDURE — 85025 COMPLETE CBC W/AUTO DIFF WBC: CPT

## 2019-12-27 PROCEDURE — 80048 BASIC METABOLIC PNL TOTAL CA: CPT

## 2019-12-27 PROCEDURE — 93005 ELECTROCARDIOGRAM TRACING: CPT | Performed by: ANESTHESIOLOGY

## 2019-12-27 RX ORDER — MONTELUKAST SODIUM 10 MG/1
10 TABLET ORAL NIGHTLY
COMMUNITY
End: 2020-04-23 | Stop reason: SDUPTHER

## 2019-12-27 RX ORDER — HYDROCHLOROTHIAZIDE 12.5 MG/1
12.5 CAPSULE, GELATIN COATED ORAL EVERY MORNING
COMMUNITY

## 2019-12-27 RX ORDER — DICLOFENAC SODIUM 75 MG/1
75 TABLET, DELAYED RELEASE ORAL 2 TIMES DAILY
COMMUNITY

## 2019-12-29 LAB
EKG P AXIS: -6 DEGREES
EKG P-R INTERVAL: 146 MS
EKG Q-T INTERVAL: 410 MS
EKG QRS DURATION: 90 MS
EKG QTC CALCULATION (BAZETT): 417 MS
EKG T AXIS: 70 DEGREES

## 2019-12-29 PROCEDURE — 93010 ELECTROCARDIOGRAM REPORT: CPT | Performed by: INTERNAL MEDICINE

## 2019-12-30 ENCOUNTER — APPOINTMENT (OUTPATIENT)
Dept: WOMENS IMAGING | Age: 64
End: 2019-12-30
Payer: MEDICARE

## 2019-12-30 ENCOUNTER — ANESTHESIA (OUTPATIENT)
Dept: OPERATING ROOM | Age: 64
End: 2019-12-30
Payer: MEDICARE

## 2019-12-30 ENCOUNTER — HOSPITAL ENCOUNTER (OUTPATIENT)
Age: 64
Setting detail: OUTPATIENT SURGERY
Discharge: HOME | End: 2019-12-30
Attending: SURGERY | Admitting: SURGERY
Payer: MEDICARE

## 2019-12-30 ENCOUNTER — ANESTHESIA EVENT (OUTPATIENT)
Dept: OPERATING ROOM | Age: 64
End: 2019-12-30
Payer: MEDICARE

## 2019-12-30 VITALS
TEMPERATURE: 97 F | RESPIRATION RATE: 22 BRPM | SYSTOLIC BLOOD PRESSURE: 117 MMHG | DIASTOLIC BLOOD PRESSURE: 63 MMHG | OXYGEN SATURATION: 94 %

## 2019-12-30 VITALS
HEIGHT: 63 IN | OXYGEN SATURATION: 95 % | HEART RATE: 76 BPM | RESPIRATION RATE: 16 BRPM | TEMPERATURE: 98.3 F | WEIGHT: 196 LBS | SYSTOLIC BLOOD PRESSURE: 131 MMHG | DIASTOLIC BLOOD PRESSURE: 58 MMHG | BODY MASS INDEX: 34.73 KG/M2

## 2019-12-30 PROCEDURE — 2500000003 HC RX 250 WO HCPCS: Performed by: NURSE ANESTHETIST, CERTIFIED REGISTERED

## 2019-12-30 PROCEDURE — 6360000002 HC RX W HCPCS: Performed by: SURGERY

## 2019-12-30 PROCEDURE — 77334 RADIATION TREATMENT AID(S): CPT | Performed by: SURGERY

## 2019-12-30 PROCEDURE — A4648 IMPLANTABLE TISSUE MARKER: HCPCS | Performed by: SURGERY

## 2019-12-30 PROCEDURE — 14302 TIS TRNFR ADDL 30 SQ CM: CPT | Performed by: PHYSICIAN ASSISTANT

## 2019-12-30 PROCEDURE — 14301 TIS TRNFR ANY 30.1-60 SQ CM: CPT | Performed by: SURGERY

## 2019-12-30 PROCEDURE — 2580000003 HC RX 258: Performed by: ANESTHESIOLOGY

## 2019-12-30 PROCEDURE — 77300 RADIATION THERAPY DOSE PLAN: CPT | Performed by: SURGERY

## 2019-12-30 PROCEDURE — 19297 PLACE BREAST CATH FOR RAD: CPT | Performed by: SURGERY

## 2019-12-30 PROCEDURE — 3600000005 HC SURGERY LEVEL 5 BASE: Performed by: SURGERY

## 2019-12-30 PROCEDURE — 19301 PARTIAL MASTECTOMY: CPT | Performed by: SURGERY

## 2019-12-30 PROCEDURE — 3700000000 HC ANESTHESIA ATTENDED CARE: Performed by: SURGERY

## 2019-12-30 PROCEDURE — 7100000000 HC PACU RECOVERY - FIRST 15 MIN: Performed by: SURGERY

## 2019-12-30 PROCEDURE — 19285 PERQ DEV BREAST 1ST US IMAG: CPT | Performed by: SURGERY

## 2019-12-30 PROCEDURE — 3700000001 HC ADD 15 MINUTES (ANESTHESIA): Performed by: SURGERY

## 2019-12-30 PROCEDURE — C1728 CATH, BRACHYTX SEED ADM: HCPCS | Performed by: SURGERY

## 2019-12-30 PROCEDURE — 2709999900 HC NON-CHARGEABLE SUPPLY: Performed by: SURGERY

## 2019-12-30 PROCEDURE — 3600000015 HC SURGERY LEVEL 5 ADDTL 15MIN: Performed by: SURGERY

## 2019-12-30 PROCEDURE — 14301 TIS TRNFR ANY 30.1-60 SQ CM: CPT | Performed by: PHYSICIAN ASSISTANT

## 2019-12-30 PROCEDURE — 77424 IO RAD TX DELIVERY BY X-RAY: CPT | Performed by: SURGERY

## 2019-12-30 PROCEDURE — 7100000010 HC PHASE II RECOVERY - FIRST 15 MIN: Performed by: SURGERY

## 2019-12-30 PROCEDURE — 6360000002 HC RX W HCPCS: Performed by: NURSE ANESTHETIST, CERTIFIED REGISTERED

## 2019-12-30 PROCEDURE — 77332 RADIATION TREATMENT AID(S): CPT | Performed by: SURGERY

## 2019-12-30 PROCEDURE — 6360000002 HC RX W HCPCS

## 2019-12-30 PROCEDURE — 2709999900 US PLACE BREAST LOC DEVICE 1ST LESION RIGHT

## 2019-12-30 PROCEDURE — 7100000001 HC PACU RECOVERY - ADDTL 15 MIN: Performed by: SURGERY

## 2019-12-30 PROCEDURE — 77280 THER RAD SIMULAJ FIELD SMPL: CPT | Performed by: SURGERY

## 2019-12-30 PROCEDURE — 19301 PARTIAL MASTECTOMY: CPT | Performed by: PHYSICIAN ASSISTANT

## 2019-12-30 PROCEDURE — 7100000011 HC PHASE II RECOVERY - ADDTL 15 MIN: Performed by: SURGERY

## 2019-12-30 PROCEDURE — 2500000003 HC RX 250 WO HCPCS: Performed by: SURGERY

## 2019-12-30 PROCEDURE — 14302 TIS TRNFR ADDL 30 SQ CM: CPT | Performed by: SURGERY

## 2019-12-30 PROCEDURE — 2580000003 HC RX 258: Performed by: SURGERY

## 2019-12-30 PROCEDURE — 19294 PREPJ TUM CAV IORT PRTL MAST: CPT | Performed by: SURGERY

## 2019-12-30 PROCEDURE — 77370 RADIATION PHYSICS CONSULT: CPT | Performed by: SURGERY

## 2019-12-30 PROCEDURE — 88307 TISSUE EXAM BY PATHOLOGIST: CPT

## 2019-12-30 DEVICE — THE MARKER IS A RADIOGRAPHIC IMPLANTABLE MARKER USED TO MARK SOFT TISSUE.IT IS COMPRISED OF A BIOABSORBABLE SPACER THAT HOLDS RADIOPAQUE MARKER CLIPS.
Type: IMPLANTABLE DEVICE | Site: BREAST | Status: FUNCTIONAL
Brand: BIOZORB MARKER

## 2019-12-30 RX ORDER — ENALAPRILAT 2.5 MG/2ML
1.25 INJECTION INTRAVENOUS
Status: DISCONTINUED | OUTPATIENT
Start: 2019-12-30 | End: 2019-12-30 | Stop reason: HOSPADM

## 2019-12-30 RX ORDER — PROPOFOL 10 MG/ML
INJECTION, EMULSION INTRAVENOUS CONTINUOUS PRN
Status: DISCONTINUED | OUTPATIENT
Start: 2019-12-30 | End: 2019-12-30 | Stop reason: SDUPTHER

## 2019-12-30 RX ORDER — PROMETHAZINE HYDROCHLORIDE 25 MG/ML
6.25 INJECTION, SOLUTION INTRAMUSCULAR; INTRAVENOUS
Status: COMPLETED | OUTPATIENT
Start: 2019-12-30 | End: 2019-12-30

## 2019-12-30 RX ORDER — MEPERIDINE HYDROCHLORIDE 50 MG/ML
12.5 INJECTION INTRAMUSCULAR; INTRAVENOUS; SUBCUTANEOUS EVERY 5 MIN PRN
Status: DISCONTINUED | OUTPATIENT
Start: 2019-12-30 | End: 2019-12-30 | Stop reason: HOSPADM

## 2019-12-30 RX ORDER — LIDOCAINE HYDROCHLORIDE 10 MG/ML
INJECTION, SOLUTION INFILTRATION; PERINEURAL PRN
Status: DISCONTINUED | OUTPATIENT
Start: 2019-12-30 | End: 2019-12-30 | Stop reason: SDUPTHER

## 2019-12-30 RX ORDER — HYDROCODONE BITARTRATE AND ACETAMINOPHEN 5; 325 MG/1; MG/1
1 TABLET ORAL EVERY 6 HOURS PRN
Qty: 12 TABLET | Refills: 0 | Status: SHIPPED | OUTPATIENT
Start: 2019-12-30 | End: 2020-12-29

## 2019-12-30 RX ORDER — LIDOCAINE HYDROCHLORIDE 10 MG/ML
1 INJECTION, SOLUTION EPIDURAL; INFILTRATION; INTRACAUDAL; PERINEURAL
Status: DISCONTINUED | OUTPATIENT
Start: 2019-12-30 | End: 2019-12-30 | Stop reason: HOSPADM

## 2019-12-30 RX ORDER — ONDANSETRON 2 MG/ML
INJECTION INTRAMUSCULAR; INTRAVENOUS PRN
Status: DISCONTINUED | OUTPATIENT
Start: 2019-12-30 | End: 2019-12-30 | Stop reason: SDUPTHER

## 2019-12-30 RX ORDER — DEXAMETHASONE SODIUM PHOSPHATE 4 MG/ML
INJECTION, SOLUTION INTRA-ARTICULAR; INTRALESIONAL; INTRAMUSCULAR; INTRAVENOUS; SOFT TISSUE PRN
Status: DISCONTINUED | OUTPATIENT
Start: 2019-12-30 | End: 2019-12-30 | Stop reason: SDUPTHER

## 2019-12-30 RX ORDER — MORPHINE SULFATE 4 MG/ML
4 INJECTION, SOLUTION INTRAMUSCULAR; INTRAVENOUS EVERY 5 MIN PRN
Status: DISCONTINUED | OUTPATIENT
Start: 2019-12-30 | End: 2019-12-30 | Stop reason: HOSPADM

## 2019-12-30 RX ORDER — KETAMINE HYDROCHLORIDE 50 MG/ML
INJECTION, SOLUTION, CONCENTRATE INTRAMUSCULAR; INTRAVENOUS PRN
Status: DISCONTINUED | OUTPATIENT
Start: 2019-12-30 | End: 2019-12-30 | Stop reason: SDUPTHER

## 2019-12-30 RX ORDER — EPHEDRINE SULFATE 50 MG/ML
INJECTION, SOLUTION INTRAVENOUS PRN
Status: DISCONTINUED | OUTPATIENT
Start: 2019-12-30 | End: 2019-12-30 | Stop reason: SDUPTHER

## 2019-12-30 RX ORDER — ROCURONIUM BROMIDE 10 MG/ML
INJECTION, SOLUTION INTRAVENOUS PRN
Status: DISCONTINUED | OUTPATIENT
Start: 2019-12-30 | End: 2019-12-30 | Stop reason: SDUPTHER

## 2019-12-30 RX ORDER — SODIUM CHLORIDE 0.9 % (FLUSH) 0.9 %
10 SYRINGE (ML) INJECTION EVERY 12 HOURS SCHEDULED
Status: DISCONTINUED | OUTPATIENT
Start: 2019-12-30 | End: 2019-12-30 | Stop reason: HOSPADM

## 2019-12-30 RX ORDER — PROPOFOL 10 MG/ML
INJECTION, EMULSION INTRAVENOUS PRN
Status: DISCONTINUED | OUTPATIENT
Start: 2019-12-30 | End: 2019-12-30 | Stop reason: SDUPTHER

## 2019-12-30 RX ORDER — SODIUM CHLORIDE, SODIUM LACTATE, POTASSIUM CHLORIDE, CALCIUM CHLORIDE 600; 310; 30; 20 MG/100ML; MG/100ML; MG/100ML; MG/100ML
INJECTION, SOLUTION INTRAVENOUS CONTINUOUS
Status: DISCONTINUED | OUTPATIENT
Start: 2019-12-30 | End: 2019-12-30 | Stop reason: HOSPADM

## 2019-12-30 RX ORDER — HYDRALAZINE HYDROCHLORIDE 20 MG/ML
5 INJECTION INTRAMUSCULAR; INTRAVENOUS EVERY 10 MIN PRN
Status: DISCONTINUED | OUTPATIENT
Start: 2019-12-30 | End: 2019-12-30 | Stop reason: HOSPADM

## 2019-12-30 RX ORDER — MORPHINE SULFATE 4 MG/ML
4 INJECTION, SOLUTION INTRAMUSCULAR; INTRAVENOUS
Status: DISCONTINUED | OUTPATIENT
Start: 2019-12-30 | End: 2019-12-30 | Stop reason: HOSPADM

## 2019-12-30 RX ORDER — FENTANYL CITRATE 50 UG/ML
50 INJECTION, SOLUTION INTRAMUSCULAR; INTRAVENOUS
Status: DISCONTINUED | OUTPATIENT
Start: 2019-12-30 | End: 2019-12-30 | Stop reason: HOSPADM

## 2019-12-30 RX ORDER — DEXAMETHASONE SODIUM PHOSPHATE 4 MG/ML
INJECTION, SOLUTION INTRA-ARTICULAR; INTRALESIONAL; INTRAMUSCULAR; INTRAVENOUS; SOFT TISSUE PRN
Status: DISCONTINUED | OUTPATIENT
Start: 2019-12-30 | End: 2019-12-30 | Stop reason: ALTCHOICE

## 2019-12-30 RX ORDER — FENTANYL CITRATE 50 UG/ML
INJECTION, SOLUTION INTRAMUSCULAR; INTRAVENOUS PRN
Status: DISCONTINUED | OUTPATIENT
Start: 2019-12-30 | End: 2019-12-30 | Stop reason: SDUPTHER

## 2019-12-30 RX ORDER — MIDAZOLAM HYDROCHLORIDE 1 MG/ML
INJECTION INTRAMUSCULAR; INTRAVENOUS PRN
Status: DISCONTINUED | OUTPATIENT
Start: 2019-12-30 | End: 2019-12-30 | Stop reason: SDUPTHER

## 2019-12-30 RX ORDER — CEFAZOLIN SODIUM 1 G/3ML
INJECTION, POWDER, FOR SOLUTION INTRAMUSCULAR; INTRAVENOUS PRN
Status: DISCONTINUED | OUTPATIENT
Start: 2019-12-30 | End: 2019-12-30 | Stop reason: SDUPTHER

## 2019-12-30 RX ORDER — SCOLOPAMINE TRANSDERMAL SYSTEM 1 MG/1
1 PATCH, EXTENDED RELEASE TRANSDERMAL ONCE
Status: DISCONTINUED | OUTPATIENT
Start: 2019-12-30 | End: 2019-12-30 | Stop reason: HOSPADM

## 2019-12-30 RX ORDER — SODIUM CHLORIDE 0.9 % (FLUSH) 0.9 %
10 SYRINGE (ML) INJECTION PRN
Status: DISCONTINUED | OUTPATIENT
Start: 2019-12-30 | End: 2019-12-30 | Stop reason: HOSPADM

## 2019-12-30 RX ORDER — MIDAZOLAM HYDROCHLORIDE 1 MG/ML
2 INJECTION INTRAMUSCULAR; INTRAVENOUS
Status: COMPLETED | OUTPATIENT
Start: 2019-12-30 | End: 2019-12-30

## 2019-12-30 RX ORDER — LABETALOL 20 MG/4 ML (5 MG/ML) INTRAVENOUS SYRINGE
5 EVERY 10 MIN PRN
Status: DISCONTINUED | OUTPATIENT
Start: 2019-12-30 | End: 2019-12-30 | Stop reason: HOSPADM

## 2019-12-30 RX ORDER — METOCLOPRAMIDE HYDROCHLORIDE 5 MG/ML
10 INJECTION INTRAMUSCULAR; INTRAVENOUS
Status: DISCONTINUED | OUTPATIENT
Start: 2019-12-30 | End: 2019-12-30 | Stop reason: HOSPADM

## 2019-12-30 RX ORDER — MIDAZOLAM HYDROCHLORIDE 1 MG/ML
INJECTION INTRAMUSCULAR; INTRAVENOUS
Status: COMPLETED
Start: 2019-12-30 | End: 2019-12-30

## 2019-12-30 RX ORDER — MORPHINE SULFATE 4 MG/ML
2 INJECTION, SOLUTION INTRAMUSCULAR; INTRAVENOUS EVERY 5 MIN PRN
Status: DISCONTINUED | OUTPATIENT
Start: 2019-12-30 | End: 2019-12-30 | Stop reason: HOSPADM

## 2019-12-30 RX ORDER — CEPHALEXIN 500 MG/1
500 CAPSULE ORAL 3 TIMES DAILY
Qty: 30 CAPSULE | Refills: 0 | Status: SHIPPED | OUTPATIENT
Start: 2019-12-30 | End: 2020-01-09

## 2019-12-30 RX ADMIN — SUGAMMADEX 200 MG: 100 INJECTION, SOLUTION INTRAVENOUS at 18:10

## 2019-12-30 RX ADMIN — ONDANSETRON HYDROCHLORIDE 4 MG: 2 INJECTION, SOLUTION INTRAMUSCULAR; INTRAVENOUS at 17:57

## 2019-12-30 RX ADMIN — HYDROMORPHONE HYDROCHLORIDE 0.5 MG: 1 INJECTION, SOLUTION INTRAMUSCULAR; INTRAVENOUS; SUBCUTANEOUS at 17:58

## 2019-12-30 RX ADMIN — LABETALOL 20 MG/4 ML (5 MG/ML) INTRAVENOUS SYRINGE 5 MG: at 18:41

## 2019-12-30 RX ADMIN — MIDAZOLAM 2 MG: 1 INJECTION INTRAMUSCULAR; INTRAVENOUS at 12:34

## 2019-12-30 RX ADMIN — Medication 20 MG: at 15:12

## 2019-12-30 RX ADMIN — PROPOFOL 180 MG: 10 INJECTION, EMULSION INTRAVENOUS at 15:12

## 2019-12-30 RX ADMIN — CEFAZOLIN 2000 MG: 330 INJECTION, POWDER, FOR SOLUTION INTRAMUSCULAR; INTRAVENOUS at 15:42

## 2019-12-30 RX ADMIN — SODIUM CHLORIDE, SODIUM LACTATE, POTASSIUM CHLORIDE, AND CALCIUM CHLORIDE: 600; 310; 30; 20 INJECTION, SOLUTION INTRAVENOUS at 15:47

## 2019-12-30 RX ADMIN — FENTANYL CITRATE 25 MCG: 50 INJECTION INTRAMUSCULAR; INTRAVENOUS at 17:41

## 2019-12-30 RX ADMIN — PROPOFOL 20 MCG/KG/MIN: 10 INJECTION, EMULSION INTRAVENOUS at 15:59

## 2019-12-30 RX ADMIN — DEXAMETHASONE SODIUM PHOSPHATE 10 MG: 4 INJECTION, SOLUTION INTRAMUSCULAR; INTRAVENOUS at 15:20

## 2019-12-30 RX ADMIN — MIDAZOLAM HYDROCHLORIDE 2 MG: 1 INJECTION INTRAMUSCULAR; INTRAVENOUS at 12:34

## 2019-12-30 RX ADMIN — SODIUM CHLORIDE, SODIUM LACTATE, POTASSIUM CHLORIDE, AND CALCIUM CHLORIDE: 600; 310; 30; 20 INJECTION, SOLUTION INTRAVENOUS at 11:48

## 2019-12-30 RX ADMIN — MIDAZOLAM 2 MG: 1 INJECTION INTRAMUSCULAR; INTRAVENOUS at 15:08

## 2019-12-30 RX ADMIN — PROPOFOL 20 MG: 10 INJECTION, EMULSION INTRAVENOUS at 15:59

## 2019-12-30 RX ADMIN — HYDROMORPHONE HYDROCHLORIDE 0.5 MG: 1 INJECTION, SOLUTION INTRAMUSCULAR; INTRAVENOUS; SUBCUTANEOUS at 18:13

## 2019-12-30 RX ADMIN — MORPHINE SULFATE 4 MG: 4 INJECTION, SOLUTION INTRAMUSCULAR; INTRAVENOUS at 18:53

## 2019-12-30 RX ADMIN — PROMETHAZINE HYDROCHLORIDE 6.25 MG: 25 INJECTION INTRAMUSCULAR; INTRAVENOUS at 18:55

## 2019-12-30 RX ADMIN — FENTANYL CITRATE 50 MCG: 50 INJECTION INTRAMUSCULAR; INTRAVENOUS at 15:12

## 2019-12-30 RX ADMIN — LIDOCAINE HYDROCHLORIDE 50 MG: 10 INJECTION, SOLUTION INFILTRATION; PERINEURAL at 15:12

## 2019-12-30 RX ADMIN — EPHEDRINE SULFATE 10 MG: 50 INJECTION INTRAMUSCULAR; INTRAVENOUS; SUBCUTANEOUS at 16:18

## 2019-12-30 RX ADMIN — ROCURONIUM BROMIDE 50 MG: 10 SOLUTION INTRAVENOUS at 16:00

## 2019-12-30 RX ADMIN — EPHEDRINE SULFATE 10 MG: 50 INJECTION INTRAMUSCULAR; INTRAVENOUS; SUBCUTANEOUS at 16:33

## 2019-12-30 RX ADMIN — FENTANYL CITRATE 25 MCG: 50 INJECTION INTRAMUSCULAR; INTRAVENOUS at 15:45

## 2019-12-30 RX ADMIN — Medication 10 MG: at 16:12

## 2019-12-30 RX ADMIN — MORPHINE SULFATE 4 MG: 4 INJECTION, SOLUTION INTRAMUSCULAR; INTRAVENOUS at 18:59

## 2019-12-30 ASSESSMENT — PAIN SCALES - GENERAL
PAINLEVEL_OUTOF10: 5
PAINLEVEL_OUTOF10: 9
PAINLEVEL_OUTOF10: 0
PAINLEVEL_OUTOF10: 5
PAINLEVEL_OUTOF10: 0
PAINLEVEL_OUTOF10: 7
PAINLEVEL_OUTOF10: 0

## 2019-12-30 ASSESSMENT — LIFESTYLE VARIABLES: SMOKING_STATUS: 0

## 2019-12-30 ASSESSMENT — PAIN DESCRIPTION - PAIN TYPE
TYPE: SURGICAL PAIN
TYPE: SURGICAL PAIN

## 2019-12-30 ASSESSMENT — PAIN DESCRIPTION - LOCATION
LOCATION: BREAST;INCISION
LOCATION: BREAST;INCISION

## 2019-12-30 ASSESSMENT — PAIN DESCRIPTION - ORIENTATION
ORIENTATION: RIGHT
ORIENTATION: RIGHT

## 2019-12-30 ASSESSMENT — PAIN DESCRIPTION - DESCRIPTORS
DESCRIPTORS: ACHING
DESCRIPTORS: ACHING

## 2019-12-30 NOTE — OP NOTE
Radiation Oncology      Patient's Name/Date of Birth:    Sharmila Connors / 1955 (34 y.o.)    DATE OF OPERATION:    12/30/2019    SURGEON:    Tevin Navarrete MD  Phone Number: [phone]    OTHER SURGEONS:      Surgeon(s):  MD Tevin Jean MD    PREOPERATIVE DIAGNOSIS:    D05.11    POSTOPERATIVE DIAGNOSIS:    D05.11    BRIEF HISTORY:    Ms. Jovani Ortega is a 71 y.o. female with a history of mammography showing microcalcifications of the right breast.  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 This lesion measured 0.5 cm in length. ER was positive at 100%.     MRI of the bilateral breasts was performed 12/12/2019. The right breast showed non-mass enhancement injury 6.3 x 1.8 cm at the upper outer breast at about the 2 o'clock position. Non-mass enhancement was also seen at the lower outer right breast 4 to 5 o'clock position measuring 2 x 0.6 cm. No suspicious enhancement in the left breast was seen. No axillary or internal mammary lymphadenopathy was noted.     Treatment options have been discussed with the patient by Dr. Lv Diaz and she has elected to proceed with right breast lumpectomy with consideration of intraoperative radiation therapy utilizing the Xoft chronic brachytherapy system. STAGE: 0 (Tis N0 M0)    OPERATION PERFORMED:    1. Right partial mastectomy. 2. Ultrasound examination of the right breast following insertion of the Xoft balloon applicator into the lumpectomy cavity. 3. Treatment planning. 4. Intraoperative irradiation using the Xoft electronic brachytherapy system. DESCRIPTION OF PROCEDURE:    Under general anesthesia, following prepping and draping in the usual sterile manner, right partial mastectomy was performed by Dr. Mireya Campo. This will be dictated separately.     A 5-6 cm Xoft balloon applicator was inserted into the right breast lumpectomy cavity

## 2019-12-30 NOTE — H&P
2008         Family History         Family History   Problem Relation Age of Onset    Breast Cancer Sister 61    Hypertension Mother      Heart Disease Mother           Social History            Tobacco Use    Smoking status: Never Smoker    Smokeless tobacco: Never Used   Substance Use Topics    Alcohol use: Yes       Comment: social         ROS:  14 point review of systems is negative except for the above.          PHYSICAL EXAM:     The patient is a 71 y.o. female  in no acute distress. She is alert oriented and cooperative. Mood and affect are appropriate. Skin is warm and dry without rashes.     HEENT: Normocephalic and atraumatic. EOMs intact. Pupils equal and round and reactive to light and accommodation. External ears and nose are normal.  Sclera nonicteric. Conjunctiva normal  Oropharynx without masses or lesions.     Neck: Neck is supple without masses or thyromegaly     Chest: Lungs are clear to auscultation. Respiratory effort normal     Cardiac: Regular rate and rhythm without rubs, murmurs, or gallops     Breasts: The breasts are symmetrical. There are fibrocystic changes throughout both breasts. There are no dominant masses, no skin or nipple changes, and no axillary adenopathy. She has a well-healed needle biopsy scar on the right.     Abdomen: The abdomen is soft and nontender with no hepatosplenomegaly. There are no abdominal hernias noted.     Extremities: The extremities are normal. There are no signs of clubbing, cyanosis, or edema.     IMPRESSION: DCIS right breast        DISCUSSION:  I had a lengthy discussion with Ms. Rodriguez  about the ramifications of the diagnosis of carcinoma in situ. I explained how carcinoma in situ is related to invasive breast cancer and stressed that this is a preinvasive malignancy with essentially 100 percent chance of cure with proper treatment.     We discussed the pathophysiology of cancer in general and also the ways in which surgery, radiation

## 2019-12-30 NOTE — CONSULTS
History   Problem Relation Age of Onset    Breast Cancer Sister 61    Heart Disease Mother     High Blood Pressure Mother     Heart Attack Mother 66       Social History     Socioeconomic History    Marital status:      Spouse name: Not on file    Number of children: Not on file    Years of education: Not on file    Highest education level: Not on file   Occupational History    Not on file   Social Needs    Financial resource strain: Not on file    Food insecurity:     Worry: Not on file     Inability: Not on file    Transportation needs:     Medical: Not on file     Non-medical: Not on file   Tobacco Use    Smoking status: Never Smoker    Smokeless tobacco: Never Used   Substance and Sexual Activity    Alcohol use: Yes     Comment: social    Drug use: No    Sexual activity: Not on file   Lifestyle    Physical activity:     Days per week: Not on file     Minutes per session: Not on file    Stress: Not on file   Relationships    Social connections:     Talks on phone: Not on file     Gets together: Not on file     Attends Holiness service: Not on file     Active member of club or organization: Not on file     Attends meetings of clubs or organizations: Not on file     Relationship status: Not on file    Intimate partner violence:     Fear of current or ex partner: Not on file     Emotionally abused: Not on file     Physically abused: Not on file     Forced sexual activity: Not on file   Other Topics Concern    Not on file   Social History Narrative    Not on file       Allergies   Allergen Reactions    Acetaminophen Rash    Naproxen Rash    Pseudoeph-Doxylamine-Dm-Apap Rash       CURRENT MEDICATIONS:      ROS:  Negative    PE:  Vitals:    12/30/19 1130   BP: (!) 165/83   Pulse: 58   Resp: 18   Temp: 97.8 °F (36.6 °C)   SpO2: 99%     No intake or output data in the 24 hours ending 12/30/19 1223  Estimated body mass index is 34.72 kg/m² as calculated from the following:    Height as of this encounter: 5' 3\" (1.6 m). Weight as of this encounter: 196 lb (88.9 kg). General Appearance:    Alert, oriented, cooperative, no distress, appears stated age   Head:    Normocephalic, without obvious abnormality, atraumatic   Eyes:    PERRL, conjunctiva/corneas clear, EOM's intact            Nose:   Nares normal, septum midline, mucosa normal, no drainage or sinus tenderness   Throat:   Lips, mucosa, and tongue normal; Teeth: Moderate repair, gums normal   Neck:   Supple, symmetrical, trachea midline, no adenopathy; Thyroid:  no enlargement/tenderness/nodules; No carotid    bruit or JVD   Back:     Symmetric, no curvature, ROM normal, no CVA tenderness   Lungs:     Clear to auscultation and percussion, respirations unlabored   Chest wall:    No tenderness or deformity   Heart:    Breasts:    Regular rate and rhythm, S1 and S2 normal; no murmur, rub   or gallop    Deferred until examination under anesthesia prior to planned   lumpectomy and axillary sentinel lymph node biopsy. Abdomen:     Soft, non-tender, bowel sounds active all four quadrants,     no masses, no organomegaly           Extremities:   Extremities normal, atraumatic, no clubbing, cyanosis or   edema       Skin:   Skin color, texture, turgor normal, no rashes or lesions   Lymph nodes:   Cervical, supraclavicular, and axillary nodes normal   Neurologic:   CNII-XII intact. Normal strength, sensation and reflexes       throughout       LAB RESULTS:  No results found for this or any previous visit (from the past 24 hour(s)). IMAGING:  Mri Breast Bilateral W Wo Contrast    Result Date: 12/12/2019  EXAM: MRI BREAST BILATERAL W WO CONTRAST -- 12/12/2019 10:26 AM INDICATION: New breast cancer. Evaluate extent of disease. HISTORY: 71 years, Female, D05.11. Pathology result 11/20/2019 with right breast 2:00 ductal carcinoma in situ. COMPARISON: 11/20/2019, 10/28/2019 FAMILY HISTORY OF BREAST CANCER: Sister with history of breast cancer.

## 2019-12-31 NOTE — BRIEF OP NOTE
Brief Postoperative Note      DATE OF PROCEDURE: 12/30/2019     SURGEON: Frank Qureshi MD    PREOPERATIVE DIAGNOSIS:  D05.11    POSTOPERATIVE DIAGNOSIS: Same     OPERATION: Procedure(s):  RIGHT LUMPECTOMY NO SENTINEL NODE, AM OF SURGERY ULTRASOUND AND INTRAOP ULTRASOUND GUIDED NEEDLE LOCALIZATION, BIOZORB, FLAPS, PECTORAL BLOCK, IORT    ANESTHESIA: General    ESTIMATED BLOOD LOSS: Minimal    COMPLICATIONS: None. SPECIMENS:   ID Type Source Tests Collected by Time Destination   A : Right Breast Tissue Tissue Breast SURGICAL PATHOLOGY Frank Qureshi MD 12/30/2019 1447    B : Additional Cranial Margins Tissue Breast SURGICAL PATHOLOGY Frank Qureshi MD 12/30/2019 1534    C : Additional Caudal Margins Tissue Breast SURGICAL PATHOLOGY Frank Qureshi MD 12/30/2019 1534    D : Additional Medial Margins Tissue Breast SURGICAL PATHOLOGY Frank Qureshi MD 12/30/2019 1630    E : Additional Deep Margins Tissue Breast SURGICAL PATHOLOGY Frank Qureshi MD 12/30/2019 1631        DRAINS:VALENTE  The patient tolerated the procedure well.     Electronically signed by Frank Qureshi MD  on 12/30/2019 at 6:55 PM

## 2020-01-06 ENCOUNTER — OFFICE VISIT (OUTPATIENT)
Dept: SURGERY | Age: 65
End: 2020-01-06

## 2020-01-06 VITALS — DIASTOLIC BLOOD PRESSURE: 80 MMHG | SYSTOLIC BLOOD PRESSURE: 124 MMHG | HEART RATE: 80 BPM

## 2020-01-06 PROCEDURE — 99024 POSTOP FOLLOW-UP VISIT: CPT | Performed by: SURGERY

## 2020-01-06 NOTE — PROGRESS NOTES
HISTORY OF PRESENT ILLNESS:    Ms. 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 ER is positive at 100%. Prelude DX demonstrates a 13% risk of all recurrence lowered to 7% with radiation therapy. It demonstrates a 7% invasive recurrence risk reduced to 5%. MRI-12/12/2019     EXAM: MRI BREAST BILATERAL W WO CONTRAST -- 12/12/2019 10:26 AM   INDICATION: New breast cancer. Evaluate extent of disease. HISTORY: 71 years, Female, D05.11. Pathology result 11/20/2019 with   right breast 2:00 ductal carcinoma in situ. COMPARISON: 11/20/2019, 10/28/2019   FAMILY HISTORY OF BREAST CANCER: Sister with history of breast cancer. TECHNIQUE: Routine protocol MRI performed of the breasts without and   with intravenous contrast. Post processing performed on a separate   workstation. FINDINGS:   Amount of Fibroglandular Tissue: Heterogeneous fibroglandular tissue. Background Parenchymal Enhancement:  Moderate. Right breast with segmental nonmass enhancement measuring about 6.3 x   1.8 cm (AP by transverse), middle third, upper outer breast, about   2:00 position. The biopsy marker is at the posterior aspect of this   nonmass enhancement. Right breast with segmental nonmass enhancement at the middle third,   lower outer breast, 4:00-5:00 position measuring about 2.0 cm x 0.6 cm   (AP by transverse). No focally suspicious enhancement in the left breast.   No axillary or internal mammary lymphadenopathy. Borderline heart size. Otherwise, the partially visualized chest and   abdomen is within normal limits considering technique.        Impression   1. Segmental nonmass enhancement in the right upper outer breast with   biopsy marker at the posterior aspect, concerning for extent of DCIS.    There is additional indeterminate segmental nonmass enhancement in the   right lower outer breast.   2. No focally suspicious findings of the

## 2020-01-06 NOTE — Clinical Note
She will need to see Sue Freire in one week for fluid check. She will need an appointment with Dr. Abdon Peters in 3 weeks. I will want to see her back in one month. She will call if anything changes.

## 2020-01-09 PROBLEM — Z98.890 STATUS POST RIGHT BREAST LUMPECTOMY: Status: ACTIVE | Noted: 2020-01-09

## 2020-01-13 ENCOUNTER — OFFICE VISIT (OUTPATIENT)
Dept: SURGERY | Age: 65
End: 2020-01-13

## 2020-01-13 VITALS
SYSTOLIC BLOOD PRESSURE: 128 MMHG | BODY MASS INDEX: 35.79 KG/M2 | HEIGHT: 63 IN | TEMPERATURE: 98 F | DIASTOLIC BLOOD PRESSURE: 74 MMHG | WEIGHT: 202 LBS

## 2020-01-13 PROCEDURE — 99024 POSTOP FOLLOW-UP VISIT: CPT | Performed by: PHYSICIAN ASSISTANT

## 2020-02-03 NOTE — PROGRESS NOTES
MEDICAL ONCOLOGY CONSULTATION    Pt Name: Joni Conde  MRN: [id number]  YOB: 1955  Date of evaluation: 2/5/2020    REASON FOR CONSULTATION: DCIS  REQUESTING PHYSICIAN:    History Obtained From:  patient and old medical records    HISTORY OF PRESENT ILLNESS:      Diagnosis  · Stage 0 DCIS of the right breast, November 2019  · ER positive  · High-grade    Treatment summary  · 12/30/2019-right lumpectomy  · 12/30/2018-IORT 2000 Gy  · 2/5/2020-Femara x5 years preventative endocrine therapy    Ms. Nilam Ríos was first seen by me on 2/5/2020 referred for diagnosis of DCIS of the right breast.  This was any screening detected lesion. · 11/20/2019-core biopsy consistent DCIS high-grade present in with multiple core biopsies. Largest focus 5 mm. %. DCISionRT low risk. · 12/12/2018-bilateral breast MRI showed segmental non-mass enhancement the right upper outer breast concerning for DCIS. · 12/30/2019- right lumpectomy by Dr. Bernadette Curry at [facility] showed residual DCIS high-grade measuring 4 mm. Margins clear. Final pathologic staging [id number]  · 12/30/2018-IORT 2000 cGy  · 2/5/2020-she was first seen by me. Recommend preventative endocrine therapy with Femara x5 years.           Past Medical History:    Past Medical History:   Diagnosis Date    Anxiety     BiPAP (biphasic positive airway pressure) dependence     BiPAP ASV 8cm to 15cm with pressure support at 4cm to 6cm and automatic rate    Central sleep apnea     Hx of blood clots 2009    DVT LEFT CALF    Hyperlipidemia     Hypertension     Obstructive sleep apnea     AHI:  116.8 per PS[facility], 2/2017       Past Surgical History:    Past Surgical History:   Procedure Laterality Date    BREAST LUMPECTOMY      Done by Dr Wilhelm March LUMPECTOMY Right 12/30/2019    RIGHT LUMPECTOMY NO SENTINEL NODE, AM OF SURGERY ULTRASOUND AND INTRAOP ULTRASOUND GUIDED NEEDLE LOCALIZATION, BIOZORB, FLAPS, PECTORAL BLOCK, IORT performed by Jayme Phi fatigue;  HEENT: no blurring of vision, no double vision, no hearing difficulty, no tinnitus, no ulceration, no dysplasia, no epistaxis;  LUNGS: no cough, no hemoptysis, no wheeze,  no shortness of breath;  BREAST: Status post right lumpectomy  CARDIOVASCULAR: no palpitation, no chest pain, no shortness of breath;  GI: no abdominal pain, no nausea, no vomiting, no diarrhea, no constipation;  DOLORES: no dysuria, no hematuria, no frequency or urgency, no nephrolithiasis;  MUSCULOSKELETAL: no joint pain, no swelling, no stiffness;  ENDOCRINE: no polyuria, no polydipsia, no cold or heat intolerance;  HEMATOLOGY: no easy bruising or bleeding, no history of clotting disorder;  DERMATOLOGY: no skin rash, no eczema, no pruritus;  PSYCHIATRY: no depression, no anxiety, no panic attacks, no suicidal ideation, no homicidal ideation;  NEUROLOGY: no syncope, no seizures, no numbness or tingling of hands, no numbness or tingling of feet, no paresis;    Objective   BP (!) 150/100   Pulse 73   Ht 5' 3\" (1.6 m)   Wt 203 lb 1.6 oz (92.1 kg)   SpO2 98%   BMI 35.98 kg/m²     PHYSICAL EXAM:  CONSTITUTIONAL: Alert, appropriate, no acute distress  EYES: Non icteric, EOM intact, pupils equal round   ENT: Mucus membranes moist, no oral pharyngeal lesions, external inspection of ears and nose are normal  NECK: Supple, no masses. No palpable thyroid mass  CHEST/LUNGS: CTA bilaterally, normal respiratory effort   CARDIOVASCULAR: RRR, no murmurs. No lower extremity edema  ABDOMEN: soft non-tender, active bowel sounds, no HSM. No palpable masses  EXTREMITIES: warm, full ROM in all 4 extremities, no focal weakness. SKIN: warm, dry with no rashes or lesions  LYMPH: No cervical, clavicular, axillary, or inguinal lymphadenopathy  NEUROLOGIC: follows commands, non focal   PSYCH: mood and affect appropriate.   Alert and oriented to time, place, person        LABORATORY RESULTS REVIEWED BY ME:  ALECN:8/5/3919  WBC-7.39  HGB-13.1  PLT-258,000  Neut-4.02    RADIOLOGY STUDIES REVIEWED BY ME:  12/12/2018-bilateral breast MRI showed segmental non-mass enhancement the right upper outer breast concerning for DCIS. ASSESSMENT:  #DCIS right breast, high-grade ER positive, December 2019  Status post lumpectomy and IORT 2000 cGy on 12/30/2019  Recommended preventative endocrine therapy with Femara x5 years  The patient was counseled today about diagnosis, staging, prognosis, diagnostic tests, medications, side effects and disease management. The method of counseling included verbal explanation. The patient verbalized understanding. History of provoked DVT left lower extremity- the patient had an episode of DVT after a trauma in her left lower extremity. She was on aspirin for 8 years. She has stopped aspirin at this point. Will avoid tamoxifen at this time. If she cannot tolerate Femara then okay to try tamoxifen 5 mg p.o. daily as preventative endocrine therapy. Health Maintenance: The patient is encouraged to follow-up with primary care regularly for further recommendations regarding age appropriated screening for cancer, well-being visit (preventative  measures), follow-up and treatment of other medical comorbidities. Survivorship Recommendations: Keep a healthy body weight. Dietary recommendations include limit energy intake from total fats and sugars; increase consumption of fruit and vegetables, as well as legumes, whole grains and nuts. Engage in regular physical activity (150 minutes spread through the week for adults). Other recommendations include minimizing alcohol intake, avoid use to stop using of tobacco products. Practice sun safety: Utilize a sunscreen with an SPF of at least 27. Avoiding tanning beds. Ensure adequate amount of sleep. Follow-up with PCP regularly. Hypertension-follow-up with PCP. Uncontrolled    Liver steatosis- follow-up with GI.   Stable    PLAN:  Start [facility] 4-month  Follow-up PCP for other medical issues    I have seen, examined and reviewed this patient medication list, appropriate labs and imaging studies. I reviewed relevant medical records and others physicians notes. I discussed the plans of care with the patient. I answered all the questions to the patients satisfaction. (Please note that portions of this note were completed with a voice recognition program. Efforts were made to edit the dictations but occasionally words are mis-transcribed.)    I, Dr Hernán Salas, personally performed the services described in this documentation as scribed by Radha Ray MA in my presence and is both accurate and complete. Over 50% of the total visit time of 60 minutes in face to face encounter with the patient, out of which more than 50% of the time was spent in counseling patient or family and coordination of care. Counseling included but was not limited to time spent reviewing labs, imaging studies/ treatment plan and answering questions.        Jermaine Jurado MD    02/05/20  3:13 PM

## 2020-02-05 ENCOUNTER — OFFICE VISIT (OUTPATIENT)
Dept: HEMATOLOGY | Age: 65
End: 2020-02-05
Payer: MEDICARE

## 2020-02-05 ENCOUNTER — HOSPITAL ENCOUNTER (OUTPATIENT)
Dept: INFUSION THERAPY | Age: 65
Discharge: HOME | End: 2020-02-05
Payer: MEDICARE

## 2020-02-05 VITALS
HEIGHT: 63 IN | OXYGEN SATURATION: 98 % | HEART RATE: 73 BPM | BODY MASS INDEX: 35.98 KG/M2 | WEIGHT: 203.1 LBS | DIASTOLIC BLOOD PRESSURE: 100 MMHG | SYSTOLIC BLOOD PRESSURE: 150 MMHG

## 2020-02-05 DIAGNOSIS — D05.11 DUCTAL CARCINOMA IN SITU (DCIS) OF RIGHT BREAST: ICD-10-CM

## 2020-02-05 PROCEDURE — 1090F PRES/ABSN URINE INCON ASSESS: CPT | Performed by: INTERNAL MEDICINE

## 2020-02-05 PROCEDURE — 4040F PNEUMOC VAC/ADMIN/RCVD: CPT | Performed by: INTERNAL MEDICINE

## 2020-02-05 PROCEDURE — 3017F COLORECTAL CA SCREEN DOC REV: CPT | Performed by: INTERNAL MEDICINE

## 2020-02-05 PROCEDURE — G8417 CALC BMI ABV UP PARAM F/U: HCPCS | Performed by: INTERNAL MEDICINE

## 2020-02-05 PROCEDURE — 99205 OFFICE O/P NEW HI 60 MIN: CPT | Performed by: INTERNAL MEDICINE

## 2020-02-05 PROCEDURE — 1123F ACP DISCUSS/DSCN MKR DOCD: CPT | Performed by: INTERNAL MEDICINE

## 2020-02-05 PROCEDURE — 85025 COMPLETE CBC W/AUTO DIFF WBC: CPT

## 2020-02-05 PROCEDURE — 1036F TOBACCO NON-USER: CPT | Performed by: INTERNAL MEDICINE

## 2020-02-05 PROCEDURE — G8484 FLU IMMUNIZE NO ADMIN: HCPCS | Performed by: INTERNAL MEDICINE

## 2020-02-05 PROCEDURE — G8400 PT W/DXA NO RESULTS DOC: HCPCS | Performed by: INTERNAL MEDICINE

## 2020-02-05 PROCEDURE — 99201 HC NEW PT, E/M LEVEL 1: CPT

## 2020-02-05 PROCEDURE — G8427 DOCREV CUR MEDS BY ELIG CLIN: HCPCS | Performed by: INTERNAL MEDICINE

## 2020-02-05 RX ORDER — LETROZOLE 2.5 MG/1
2.5 TABLET, FILM COATED ORAL DAILY
Qty: 30 TABLET | Refills: 5 | Status: SHIPPED | OUTPATIENT
Start: 2020-02-05

## 2020-02-07 NOTE — PROGRESS NOTES
HISTORY OF PRESENT ILLNESS:    Ms. 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 ER is positive at 100%. Prelude DX demonstrates a 13% risk of all recurrence lowered to 7% with radiation therapy. It demonstrates a 7% invasive recurrence risk reduced to 5%. MRI-12/12/2019     EXAM: MRI BREAST BILATERAL W WO CONTRAST -- 12/12/2019 10:26 AM   INDICATION: New breast cancer. Evaluate extent of disease. HISTORY: 71 years, Female, D05.11. Pathology result 11/20/2019 with   right breast 2:00 ductal carcinoma in situ. COMPARISON: 11/20/2019, 10/28/2019   FAMILY HISTORY OF BREAST CANCER: Sister with history of breast cancer. TECHNIQUE: Routine protocol MRI performed of the breasts without and   with intravenous contrast. Post processing performed on a separate   workstation. FINDINGS:   Amount of Fibroglandular Tissue: Heterogeneous fibroglandular tissue. Background Parenchymal Enhancement:  Moderate. Right breast with segmental nonmass enhancement measuring about 6.3 x   1.8 cm (AP by transverse), middle third, upper outer breast, about   2:00 position. The biopsy marker is at the posterior aspect of this   nonmass enhancement. Right breast with segmental nonmass enhancement at the middle third,   lower outer breast, 4:00-5:00 position measuring about 2.0 cm x 0.6 cm   (AP by transverse). No focally suspicious enhancement in the left breast.   No axillary or internal mammary lymphadenopathy. Borderline heart size. Otherwise, the partially visualized chest and   abdomen is within normal limits considering technique.        Impression   1. Segmental nonmass enhancement in the right upper outer breast with   biopsy marker at the posterior aspect, concerning for extent of DCIS.    There is additional indeterminate segmental nonmass enhancement in the   right lower outer breast.   2. No focally suspicious findings of the left breast by MRI. She is now status post right lumpectomy with IORT on 12/30/2019        PATHOLOGY REVEALS:  FINAL DIAGNOSIS:    A.  Breast, right 2 o'clock, wire localized lumpectomy:  Ductal carcinoma in situ, high-grade with central necrosis  Ductal carcinoma in situ measures 4 mm in greatest linear dimension  Adjacent biopsy site  Margins of resection are negative for ductal carcinoma in situ. The closest margin of resection is the green inferior margin at 3 mm    B.  Breast, right, additional cranial margin:  Negative for ductal carcinoma in situ    C.  Breast, right, additional caudal margin:  Negative for ductal carcinoma in situ    D.  Breast, right, additional medial margin:  Small intraductal papilloma, 2 mm in greatest linear dimension, excised  Negative for ductal carcinoma in situ    E.  Breast, right, additional deep margin:  Negative for ductal carcinoma in situ    AJCC cancer stage:  pTis, pNX    PHYSICAL EXAM:  The  wounds look good with no evidence of infection, fluid accumulation, or skin necrosis. IMPRESSION:    S/p right lumpectomy and IORT-12/30/2019    PLAN:Return in July 2020 with a unilateral Right mammogram     I have seen, examined and reviewed this patient medication list, appropriate labs and imaging studies. I reviewed relevant medical records and others physicians notes. I discussed the plans of care with the patient. I answered all the questions to the patients satisfaction. I, Dr Rodney Hayward, personally performed the services described in this documentation as scribed by Florencia Mckeon MA in my presence and is both accurate and complete.      (Please note that portions of this note were completed with a voice recognition program. Efforts were made to edit the dictations but occasionally words are mis-transcribed.)  Over 50% of the total visit time of 15 minutes in face to face encounter with the patient, out of which more than 50% of the time was spent in counseling patient or family and coordination of care. Counseling included but was not limited to time spent reviewing labs, imaging studies/ treatment plan and answering questions.

## 2020-02-10 ENCOUNTER — OFFICE VISIT (OUTPATIENT)
Dept: SURGERY | Age: 65
End: 2020-02-10

## 2020-02-10 VITALS — HEART RATE: 84 BPM | SYSTOLIC BLOOD PRESSURE: 138 MMHG | DIASTOLIC BLOOD PRESSURE: 80 MMHG

## 2020-02-10 PROCEDURE — 99024 POSTOP FOLLOW-UP VISIT: CPT | Performed by: SURGERY

## 2020-02-10 NOTE — Clinical Note
Follow-up in July with unilateral right mammogram Patient reports urgent need to have bowel movement. Ambulatory to restroom at this time.

## 2020-03-05 DIAGNOSIS — K21.9 GASTROESOPHAGEAL REFLUX DISEASE, ESOPHAGITIS PRESENCE NOT SPECIFIED: Primary | ICD-10-CM

## 2020-03-05 RX ORDER — PANTOPRAZOLE SODIUM 40 MG/1
40 TABLET, DELAYED RELEASE ORAL DAILY
Qty: 90 TABLET | Refills: 2 | Status: SHIPPED | OUTPATIENT
Start: 2020-03-05 | End: 2020-12-10

## 2020-03-05 NOTE — TELEPHONE ENCOUNTER
Pt states Pantoprazole is working well for her-would like 90 day supply sent to [facility]. I pended new script to [facility].  Pt advised to call me back with any further questions/problems.

## 2020-03-23 ENCOUNTER — TELEPHONE (OUTPATIENT)
Dept: NEUROLOGY | Age: 65
End: 2020-03-23

## 2020-04-23 ENCOUNTER — TELEPHONE (OUTPATIENT)
Dept: SURGERY | Age: 65
End: 2020-04-23

## 2020-04-23 RX ORDER — MONTELUKAST SODIUM 10 MG/1
10 TABLET ORAL NIGHTLY
Qty: 90 TABLET | Refills: 3 | Status: SHIPPED | OUTPATIENT
Start: 2020-04-23

## 2020-07-30 ENCOUNTER — OFFICE VISIT (OUTPATIENT)
Dept: SURGERY | Age: 65
End: 2020-07-30
Payer: MEDICARE

## 2020-07-30 ENCOUNTER — HOSPITAL ENCOUNTER (OUTPATIENT)
Dept: WOMENS IMAGING | Age: 65
Discharge: HOME | End: 2020-07-30
Payer: MEDICARE

## 2020-07-30 VITALS
HEART RATE: 73 BPM | BODY MASS INDEX: 34.2 KG/M2 | SYSTOLIC BLOOD PRESSURE: 133 MMHG | WEIGHT: 193 LBS | DIASTOLIC BLOOD PRESSURE: 82 MMHG | TEMPERATURE: 98.5 F | HEIGHT: 63 IN

## 2020-07-30 PROCEDURE — G0279 TOMOSYNTHESIS, MAMMO: HCPCS

## 2020-07-30 PROCEDURE — 3017F COLORECTAL CA SCREEN DOC REV: CPT | Performed by: PHYSICIAN ASSISTANT

## 2020-07-30 PROCEDURE — 99213 OFFICE O/P EST LOW 20 MIN: CPT | Performed by: PHYSICIAN ASSISTANT

## 2020-07-30 PROCEDURE — G8427 DOCREV CUR MEDS BY ELIG CLIN: HCPCS | Performed by: PHYSICIAN ASSISTANT

## 2020-07-30 PROCEDURE — 1036F TOBACCO NON-USER: CPT | Performed by: PHYSICIAN ASSISTANT

## 2020-07-30 PROCEDURE — G8400 PT W/DXA NO RESULTS DOC: HCPCS | Performed by: PHYSICIAN ASSISTANT

## 2020-07-30 PROCEDURE — 1123F ACP DISCUSS/DSCN MKR DOCD: CPT | Performed by: PHYSICIAN ASSISTANT

## 2020-07-30 PROCEDURE — 1090F PRES/ABSN URINE INCON ASSESS: CPT | Performed by: PHYSICIAN ASSISTANT

## 2020-07-30 PROCEDURE — 4040F PNEUMOC VAC/ADMIN/RCVD: CPT | Performed by: PHYSICIAN ASSISTANT

## 2020-07-30 PROCEDURE — G8417 CALC BMI ABV UP PARAM F/U: HCPCS | Performed by: PHYSICIAN ASSISTANT

## 2020-07-30 NOTE — PROGRESS NOTES
HISTORY OF PRESENT ILLNESS:    Ms. 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 ER is positive at 100%. Prelude DX demonstrates a 13% risk of all recurrence lowered to 7% with radiation therapy. It demonstrates a 7% invasive recurrence risk reduced to 5%. She is now status post right lumpectomy with IORT on 12/30/2019        PATHOLOGY REVEALS:  FINAL DIAGNOSIS:    A.  Breast, right 2 o'clock, wire localized lumpectomy:  Ductal carcinoma in situ, high-grade with central necrosis  Ductal carcinoma in situ measures 4 mm in greatest linear dimension  Adjacent biopsy site  Margins of resection are negative for ductal carcinoma in situ. The closest margin of resection is the green inferior margin at 3 mm    B.  Breast, right, additional cranial margin:  Negative for ductal carcinoma in situ    C.  Breast, right, additional caudal margin:  Negative for ductal carcinoma in situ    D.  Breast, right, additional medial margin:  Small intraductal papilloma, 2 mm in greatest linear dimension, excised  Negative for ductal carcinoma in situ    E.  Breast, right, additional deep margin:  Negative for ductal carcinoma in situ    AJCC cancer stage:  pTis, pNX    BREAST EXAM:  There are fibrocystic changes throughout both breasts. There are appropriate postoperative changes on the right. There are no dominant masses, skin dimpling or nipple retraction. There is no axillary lymphadenopathy bilaterally. IMPRESSION:  S/p right lumpectomy and IORT-12/30/2019    PLAN:  I will see her in 12/2020 with bilateral mammograms. She will call sooner with any concerns. 15 minutes spent, which includes face to face with patient, record review, evaluation, planning, and education. I spent over 50% of this visit counseling patient.

## 2020-12-01 ENCOUNTER — OFFICE VISIT (OUTPATIENT)
Dept: SURGERY | Age: 65
End: 2020-12-01
Payer: MEDICARE

## 2020-12-01 ENCOUNTER — HOSPITAL ENCOUNTER (OUTPATIENT)
Dept: WOMENS IMAGING | Age: 65
Discharge: HOME | End: 2020-12-01
Payer: MEDICARE

## 2020-12-01 VITALS
TEMPERATURE: 99 F | SYSTOLIC BLOOD PRESSURE: 130 MMHG | HEART RATE: 68 BPM | WEIGHT: 198 LBS | BODY MASS INDEX: 35.08 KG/M2 | DIASTOLIC BLOOD PRESSURE: 80 MMHG | HEIGHT: 63 IN

## 2020-12-01 PROCEDURE — G8400 PT W/DXA NO RESULTS DOC: HCPCS | Performed by: PHYSICIAN ASSISTANT

## 2020-12-01 PROCEDURE — 4040F PNEUMOC VAC/ADMIN/RCVD: CPT | Performed by: PHYSICIAN ASSISTANT

## 2020-12-01 PROCEDURE — 1090F PRES/ABSN URINE INCON ASSESS: CPT | Performed by: PHYSICIAN ASSISTANT

## 2020-12-01 PROCEDURE — G8427 DOCREV CUR MEDS BY ELIG CLIN: HCPCS | Performed by: PHYSICIAN ASSISTANT

## 2020-12-01 PROCEDURE — G8484 FLU IMMUNIZE NO ADMIN: HCPCS | Performed by: PHYSICIAN ASSISTANT

## 2020-12-01 PROCEDURE — 1123F ACP DISCUSS/DSCN MKR DOCD: CPT | Performed by: PHYSICIAN ASSISTANT

## 2020-12-01 PROCEDURE — 1036F TOBACCO NON-USER: CPT | Performed by: PHYSICIAN ASSISTANT

## 2020-12-01 PROCEDURE — 3017F COLORECTAL CA SCREEN DOC REV: CPT | Performed by: PHYSICIAN ASSISTANT

## 2020-12-01 PROCEDURE — G8417 CALC BMI ABV UP PARAM F/U: HCPCS | Performed by: PHYSICIAN ASSISTANT

## 2020-12-01 PROCEDURE — G0279 TOMOSYNTHESIS, MAMMO: HCPCS

## 2020-12-01 PROCEDURE — 99213 OFFICE O/P EST LOW 20 MIN: CPT | Performed by: PHYSICIAN ASSISTANT

## 2020-12-06 NOTE — PROGRESS NOTES
HISTORY OF PRESENT ILLNESS:    Ms. Senait Mayberry  is status post 11/20/2019 stereotactic breast biopsy  on the right which revealed a 0.5  cm high grade ductal carcinoma in situ. ER is positive at 100%. Prelude DX demonstrates a 13% risk of all recurrence lowered to 7% with radiation therapy. It demonstrates a 7% invasive recurrence risk reduced to 5%. She is now status post right lumpectomy with IORT on 12/30/2019        PATHOLOGY REVEALS:  FINAL DIAGNOSIS:    A.  Breast, right 2 o'clock, wire localized lumpectomy:  Ductal carcinoma in situ, high-grade with central necrosis  Ductal carcinoma in situ measures 4 mm in greatest linear dimension  Adjacent biopsy site  Margins of resection are negative for ductal carcinoma in situ. The closest margin of resection is the green inferior margin at 3 mm    B.  Breast, right, additional cranial margin:  Negative for ductal carcinoma in situ    C.  Breast, right, additional caudal margin:  Negative for ductal carcinoma in situ    D.  Breast, right, additional medial margin:  Small intraductal papilloma, 2 mm in greatest linear dimension, excised  Negative for ductal carcinoma in situ    E.  Breast, right, additional deep margin:  Negative for ductal carcinoma in situ    AJCC cancer stage:  pTis, pNX    PHYSICAL EXAM:  BREAST EXAM:  The right lumpectomy site has healed well. There are fibrocystic changes throughout both breasts. There are no dominant masses, skin dimpling or nipple retraction. There is no axillary lymphadenopathy bilaterally. IMPRESSION:  Doing well s/p right lumpectomy and IORT-12/30/2019    PLAN:  I will see her in 1 year with bilateral mammograms. She will call with any concerns. 15 minutes spent, which includes face to face with patient, record review, evaluation, planning, and education. I spent over 50% of this visit counseling patient.

## 2020-12-10 DIAGNOSIS — K21.9 GASTROESOPHAGEAL REFLUX DISEASE: ICD-10-CM

## 2020-12-10 RX ORDER — PANTOPRAZOLE SODIUM 40 MG/1
TABLET, DELAYED RELEASE ORAL
Qty: 90 TABLET | Refills: 0 | Status: SHIPPED | OUTPATIENT
Start: 2020-12-10 | End: 2021-03-23 | Stop reason: SDUPTHER

## 2021-03-10 DIAGNOSIS — K21.9 GASTROESOPHAGEAL REFLUX DISEASE: ICD-10-CM

## 2021-03-10 RX ORDER — PANTOPRAZOLE SODIUM 40 MG/1
TABLET, DELAYED RELEASE ORAL
Qty: 90 TABLET | Refills: 3 | OUTPATIENT
Start: 2021-03-10

## 2021-03-23 DIAGNOSIS — K21.9 GASTROESOPHAGEAL REFLUX DISEASE: ICD-10-CM

## 2021-03-23 RX ORDER — PANTOPRAZOLE SODIUM 40 MG/1
40 TABLET, DELAYED RELEASE ORAL DAILY
Qty: 90 TABLET | Refills: 0 | Status: SHIPPED | OUTPATIENT
Start: 2021-03-23 | End: 2021-04-14 | Stop reason: SDUPTHER

## 2021-03-23 NOTE — TELEPHONE ENCOUNTER
Pt called requesting refills on her Pantoprazole be called in to [facility]. She is due for yearly OV-I transferred her to [facility] to schedule appt and pended 1 refill to [facility]. Pt advised to call me back with any further questions/problems.

## 2021-04-14 ENCOUNTER — TELEMEDICINE (OUTPATIENT)
Dept: GASTROENTEROLOGY | Facility: CLINIC | Age: 66
End: 2021-04-14

## 2021-04-14 DIAGNOSIS — R12 HEARTBURN: ICD-10-CM

## 2021-04-14 DIAGNOSIS — K21.9 GASTROESOPHAGEAL REFLUX DISEASE, UNSPECIFIED WHETHER ESOPHAGITIS PRESENT: ICD-10-CM

## 2021-04-14 DIAGNOSIS — R19.7 DIARRHEA, UNSPECIFIED TYPE: Primary | ICD-10-CM

## 2021-04-14 DIAGNOSIS — K21.9 GASTROESOPHAGEAL REFLUX DISEASE: ICD-10-CM

## 2021-04-14 PROCEDURE — 99213 OFFICE O/P EST LOW 20 MIN: CPT | Performed by: NURSE PRACTITIONER

## 2021-04-14 RX ORDER — PANTOPRAZOLE SODIUM 40 MG/1
40 TABLET, DELAYED RELEASE ORAL DAILY
Qty: 90 TABLET | Refills: 4 | Status: SHIPPED | OUTPATIENT
Start: 2021-04-14 | End: 2022-05-06

## 2021-04-14 RX ORDER — ARIPIPRAZOLE 5 MG/1
1 TABLET ORAL DAILY
COMMUNITY
Start: 2021-03-30 | End: 2022-09-06

## 2021-04-14 NOTE — PROGRESS NOTES
"Chief Complaint:   Chief Complaint   Patient presents with   • Med Refill     Pt presents today for yearly OV for GERD-Needs refills on Pantoprazole-pt states she is having no issues with reflux   • Diarrhea     Pt does c/o frequent diarrhea for the last year-states if she eats 2-3 Activia a day it helps         Patient ID: Sada Casey is a 65 y.o. female     History of Present Illness: This is a very pleasant 65-year-old female who I have had an A/V telehealth encounter with the patient at her home and me at my office at [facility].    The patient's last EGD was performed on 11/18/2019 found to be normal.  The patient carries a history of heartburn and GERD treated with Protonix 40 mg daily.  She states the Protonix works well for her controlling both her reflux and heartburn.  She states she needs a refill for this.    In addition, the patient tells me today that she has continued having chronic diarrhea.  In 2019 she saw me with these complaints.  CT of abdomen pelvis was ordered found to be normal.  Gastrointestinal panel for pathogens and C. difficile were negative.  Labs were unremarkable.  EGD as noted above was performed 2019 found to be normal.  The patient also underwent colonoscopy that revealed diverticulosis only 2019.  She tells me that this comes in bouts and approximately 1 month ago she had \"3 days of just watery diarrhea all day that smelled very foul.\"  She tells me she treated herself with some Kaopectate and that got better.  She states that she did take Bentyl in the past however she has not taken this for a long time.  She states she does experience some abdominal cramping.    The patient denies any nausea, vomiting, epigastric pain, dysphagia, pyrosis or hematemesis.  The patient denies any fever or chills.  Denies any melena or hematochezia.  Denies any unintentional weight loss or loss of appetite.    Past Medical History:   Diagnosis Date   • Anxiety    • Arthritis    • " Diverticulosis    • DVT (deep venous thrombosis) (CMS/HCC)     left leg, about 6 yrs   • Eczema    • GERD (gastroesophageal reflux disease)    • Hyperlipidemia    • Hypertension    • Insomnia    • Right leg numbness    • Sleep apnea     cpap   • Stress incontinence    • TIA (transient ischemic attack)        Past Surgical History:   Procedure Laterality Date   • BREAST LUMPECTOMY Left    • CATARACT EXTRACTION WITH INTRAOCULAR LENS IMPLANT Bilateral    • COLONOSCOPY  12/15/2011    Diverticulosis; Repeat 7 years   • COLONOSCOPY N/A 2/1/2019    Diverticulosis in the left colon; The examination was otherwise normal on direct and retroflexion views; No specimens collected; Repeat 10 years   • ENDOSCOPY  12/15/2011    [facility]   • ENDOSCOPY N/A 11/18/2019    Normal esophagus; Normal stomach; Normal examined duodenum-biopsied   • HYSTERECTOMY     • STEROID INJECTION Bilateral 8/21/2018    Procedure: 2, 3 TARSOMETATARSAL JOINT INJECTION WITH FLUROSCOPIC GUIDANCE - BILATERAL FEET;  Surgeon: Senthil Curtis DPM;  Location:  [facility] OR;  Service: Podiatry   • STEROID INJECTION Bilateral 1/15/2019    Procedure: 2, 3, TARSOMETATARSAL JOINT INJECTION WITH FLUROSCOPIC GUIDANCE- BILATERAL FEET;  Surgeon: Senthil Curtis DPM;  Location:  [facility] OR;  Service: Podiatry         Current Outpatient Medications:   •  ARIPiprazole (ABILIFY) 5 MG tablet, Take 1 tablet by mouth Daily., Disp: , Rfl:   •  bismuth subsalicylate (PEPTO BISMOL) 262 MG/15ML suspension, Take 15 mL by mouth 2 (Two) Times a Day As Needed for Indigestion., Disp: , Rfl:   •  clotrimazole-betamethasone (LOTRISONE) 1-0.05 % cream, Apply  topically 2 (Two) Times a Day. (Patient taking differently: Apply  topically to the appropriate area as directed 2 (Two) Times a Day. As needed), Disp: 15 g, Rfl: 0  •  diclofenac (VOLTAREN) 75 MG EC tablet, Take 1 tablet by mouth Daily., Disp: , Rfl:   •  DULoxetine (CYMBALTA) 30 MG capsule, Take 60 mg by mouth Daily., Disp: , Rfl:   •   hydrochlorothiazide (MICROZIDE) 12.5 MG capsule, Take 12.5 mg by mouth every morning , Disp: , Rfl:   •  irbesartan (AVAPRO) 150 MG tablet, Take 150 mg by mouth daily , Disp: , Rfl:   •  metoprolol succinate XL (TOPROL-XL) 100 MG 24 hr tablet, Take 100 mg by mouth daily , Disp: , Rfl:   •  pantoprazole (PROTONIX) 40 MG EC tablet, Take 1 tablet by mouth Daily., Disp: 90 tablet, Rfl: 4    Allergies   Allergen Reactions   • Acetaminophen Rash   • Naprosyn [Naproxen] Rash   • Nyquil Multi-Symptom [Pseudoeph-Doxylamine-Dm-Apap] Rash       Social History     Socioeconomic History   • Marital status:      Spouse name: Not on file   • Number of children: Not on file   • Years of education: Not on file   • Highest education level: Not on file   Tobacco Use   • Smoking status: Never Smoker   • Smokeless tobacco: Never Used   Vaping Use   • Vaping Use: Never used   Substance and Sexual Activity   • Alcohol use: Yes     Comment: Occasional   • Drug use: No   • Sexual activity: Defer       Family History   Problem Relation Age of Onset   • No Known Problems Father    • Coronary artery disease Mother    • No Known Problems Sister    • No Known Problems Sister    • No Known Problems Sister    • No Known Problems Sister    • Colon cancer Neg Hx    • Colon polyps Neg Hx    • Esophageal cancer Neg Hx    • Liver cancer Neg Hx    • Liver disease Neg Hx    • Rectal cancer Neg Hx    • Stomach cancer Neg Hx        There were no vitals filed for this visit.    Review of Systems:    General:    Present -feeling well   Skin:    Not Present-Rash   HEENT:     Not Present-Acute visual changes or Acute hearing changes   Neck :    Not Present- swollen glands   Genitourinary:      Not Present- burning, frequency, urgency hematuria, dysuria,   Cardiovascular:   Not Present-chest pain, palpitations, or pressure   Respiratory:   Not Present- shortness of breath or cough   Gastrointestinal:  Musculoskeletal:  Neurological:  Psychiatric:    Present as mentioned in the HP    Not Present. Recent gait disturbances.    Not Present-Seizures and weakness in extremities.    Not Present- Anxiety or Depression.    Physical Exam   Constitutional: She appears well-developed and well-nourished.   HENT:   Head: Normocephalic and atraumatic.   Neurological: She is alert.   Psychiatric: She has a normal mood and affect.          Lab Results - Last 18 Months   Lab Units 12/27/19  0950 11/07/19  1430   GLUCOSE mg/dL 139* 88   BUN mg/dL 10 11   CREATININE mg/dL 0.5 0.55*   SODIUM mmol/L 142 142   POTASSIUM mmol/L 3.5 3.5   CHLORIDE mmol/L 103 103   TOTAL CO2 mmol/L 22  --    CO2 mmol/L  --  26.5   TOTAL PROTEIN g/dL  --  7.1   ALBUMIN g/dL  --  3.80   ALT (SGPT) U/L  --  32   AST (SGOT) U/L  --  26   ALK PHOS U/L  --  77   BILIRUBIN mg/dL  --  0.3   GLOBULIN gm/dL  --  3.3       Lab Results - Last 18 Months   Lab Units 12/27/19  0950 11/07/19  1430   HEMOGLOBIN g/dL 12.8 13.5   HEMATOCRIT % 40.6 39.6   MCV fL 87.9 83.7   WBC K/uL 9.2 7.43   RDW % 13.2 13.1   MPV fL 9.4 9.8   PLATELETS K/uL 285 290       Lab Results - Last 18 Months   Lab Units 11/07/19  1430   IRON mcg/dL 48   TIBC mcg/dL 335   IRON SATURATION % 14*   FERRITIN ng/mL 138.00        Lab Results - Last 18 Months   Lab Units 11/07/19  1430   HEP B C IGM  Non-Reactive   HEP B S AG  Non-Reactive   FERRITIN ng/mL 138.00   CERULOPLASMIN mg/dL 27   MITOCHONDRIAL AB Units <20.0   A1 ANTITRYPSIN mg/dL 149       Assessment and Plan:  Assessment/Plan   Diagnoses and all orders for this visit:    1. Diarrhea, unspecified type (Primary)  -     Gastrointestinal Panel, PCR - Stool, Per Rectum; Future  -     Fecal Lactoferrin - Stool, Per Rectum; Future  -     Clostridium Difficile Toxin - , Per Rectum; Future    2. Gastroesophageal reflux disease, unspecified whether esophagitis present    3. Heartburn    4. Gastroesophageal reflux disease  -     pantoprazole (PROTONIX) 40 MG EC tablet; Take 1 tablet by mouth Daily.   Dispense: 90 tablet; Refill: 4    I have refilled the patient's Protonix.  Will need to follow-up with me in 1 year annual GERD.  Due to her complaints of the watery diarrhea I will check gastrointestinal panel for pathogens as well as C. difficile.  Do not feel that we need to proceed to colonoscopy or CT scan.  Most likely this is IBS and if stools are normal I will instruct her to initiate Imodium and give Bentyl if needed for complaints of abdominal cramping.    This was an audio and video enabled telemedicine encounter.     There are no Patient Instructions on file for this visit.    Next follow-up appointment        EMR Dragon/Transcription disclaimer:  Much of this encounter note is an electronic transcription/translation of spoken language to printed text. The electronic translation of spoken language may permit erroneous, or at times, nonsensical words or phrases to be inadvertently transcribed; although I have reviewed the note for such errors, some may still exist.

## 2021-04-16 ENCOUNTER — TELEPHONE (OUTPATIENT)
Dept: GASTROENTEROLOGY | Facility: CLINIC | Age: 66
End: 2021-04-16

## 2021-04-16 ENCOUNTER — LAB (OUTPATIENT)
Dept: LAB | Facility: HOSPITAL | Age: 66
End: 2021-04-16

## 2021-04-16 DIAGNOSIS — R19.7 DIARRHEA, UNSPECIFIED TYPE: ICD-10-CM

## 2021-04-16 DIAGNOSIS — R19.7 DIARRHEA, UNSPECIFIED TYPE: Primary | ICD-10-CM

## 2021-04-16 DIAGNOSIS — R10.9 ABDOMINAL CRAMPING: ICD-10-CM

## 2021-04-16 LAB
ADV 40+41 DNA STL QL NAA+NON-PROBE: NOT DETECTED
ASTRO TYP 1-8 RNA STL QL NAA+NON-PROBE: NOT DETECTED
C CAYETANENSIS DNA STL QL NAA+NON-PROBE: NOT DETECTED
C COLI+JEJ+UPSA DNA STL QL NAA+NON-PROBE: NOT DETECTED
C DIFF TOX GENS STL QL NAA+PROBE: NEGATIVE
CRYPTOSP DNA STL QL NAA+NON-PROBE: NOT DETECTED
E HISTOLYT DNA STL QL NAA+NON-PROBE: NOT DETECTED
EAEC PAA PLAS AGGR+AATA ST NAA+NON-PRB: NOT DETECTED
EC STX1+STX2 GENES STL QL NAA+NON-PROBE: NOT DETECTED
EPEC EAE GENE STL QL NAA+NON-PROBE: NOT DETECTED
ETEC LTA+ST1A+ST1B TOX ST NAA+NON-PROBE: NOT DETECTED
G LAMBLIA DNA STL QL NAA+NON-PROBE: NOT DETECTED
LACTOFERRIN STL QL LA: POSITIVE
NOROVIRUS GI+II RNA STL QL NAA+NON-PROBE: NOT DETECTED
P SHIGELLOIDES DNA STL QL NAA+NON-PROBE: NOT DETECTED
RVA RNA STL QL NAA+NON-PROBE: NOT DETECTED
S ENT+BONG DNA STL QL NAA+NON-PROBE: NOT DETECTED
SAPO I+II+IV+V RNA STL QL NAA+NON-PROBE: NOT DETECTED
SHIGELLA SP+EIEC IPAH ST NAA+NON-PROBE: NOT DETECTED
V CHOL+PARA+VUL DNA STL QL NAA+NON-PROBE: NOT DETECTED
V CHOLERAE DNA STL QL NAA+NON-PROBE: NOT DETECTED
Y ENTEROCOL DNA STL QL NAA+NON-PROBE: NOT DETECTED

## 2021-04-16 PROCEDURE — 83630 LACTOFERRIN FECAL (QUAL): CPT

## 2021-04-16 PROCEDURE — 87493 C DIFF AMPLIFIED PROBE: CPT

## 2021-04-16 PROCEDURE — 0097U HC BIOFIRE FILMARRAY GI PANEL: CPT

## 2021-04-16 RX ORDER — DICYCLOMINE HYDROCHLORIDE 10 MG/1
10 CAPSULE ORAL
Qty: 120 CAPSULE | Refills: 11 | Status: SHIPPED | OUTPATIENT
Start: 2021-04-16 | End: 2021-07-12

## 2021-04-16 RX ORDER — SODIUM, POTASSIUM,MAG SULFATES 17.5-3.13G
1 SOLUTION, RECONSTITUTED, ORAL ORAL EVERY 12 HOURS
Qty: 1 ML | Refills: 0 | Status: ON HOLD | OUTPATIENT
Start: 2021-04-16 | End: 2021-05-06

## 2021-04-16 NOTE — TELEPHONE ENCOUNTER
----- Message from VINICIO Taylor sent at 4/16/2021 11:04 AM CDT -----  Please notify the patient that stools were negative for pathogens as well as C. difficile.  It did show however her white blood cells which normally means inflammation.  We may need to schedule her for a colonoscopy to rule out microscopic colitis.  In the meantime however please have her start Imodium 1 before every meal and at bedtime.  I am also going to send in a prescription for Bentyl to help with the abdominal cramping. I have placed orders.

## 2021-05-03 ENCOUNTER — LAB (OUTPATIENT)
Dept: LAB | Facility: HOSPITAL | Age: 66
End: 2021-05-03

## 2021-05-03 ENCOUNTER — TRANSCRIBE ORDERS (OUTPATIENT)
Dept: LAB | Facility: HOSPITAL | Age: 66
End: 2021-05-03

## 2021-05-03 DIAGNOSIS — Z01.818 PREOP TESTING: Primary | ICD-10-CM

## 2021-05-03 PROCEDURE — U0004 COV-19 TEST NON-CDC HGH THRU: HCPCS | Performed by: INTERNAL MEDICINE

## 2021-05-03 PROCEDURE — C9803 HOPD COVID-19 SPEC COLLECT: HCPCS | Performed by: INTERNAL MEDICINE

## 2021-05-04 LAB — SARS-COV-2 ORF1AB RESP QL NAA+PROBE: NOT DETECTED

## 2021-05-05 ENCOUNTER — TELEPHONE (OUTPATIENT)
Dept: GASTROENTEROLOGY | Facility: CLINIC | Age: 66
End: 2021-05-05

## 2021-05-05 NOTE — TELEPHONE ENCOUNTER
I believe it will be ok.  Have her buy one bottle of mag citrate to have on hand just in case prep doesn't go well tonight.  She can drink I/2 to full bottle if needed.      Shawnee Marin MD

## 2021-05-05 NOTE — TELEPHONE ENCOUNTER
Pt is schedule for colonoscopy tomorrow 5/6/21 and she ate 2 eggs and 2 slices of carolina this morning for breakfast. Do I need to reschedule pt?

## 2021-05-06 ENCOUNTER — TELEPHONE (OUTPATIENT)
Dept: GASTROENTEROLOGY | Facility: CLINIC | Age: 66
End: 2021-05-06

## 2021-05-06 ENCOUNTER — ANESTHESIA EVENT (OUTPATIENT)
Dept: GASTROENTEROLOGY | Facility: HOSPITAL | Age: 66
End: 2021-05-06

## 2021-05-06 ENCOUNTER — HOSPITAL ENCOUNTER (OUTPATIENT)
Facility: HOSPITAL | Age: 66
Setting detail: HOSPITAL OUTPATIENT SURGERY
Discharge: HOME | End: 2021-05-06
Attending: INTERNAL MEDICINE | Admitting: INTERNAL MEDICINE

## 2021-05-06 ENCOUNTER — ANESTHESIA (OUTPATIENT)
Dept: GASTROENTEROLOGY | Facility: HOSPITAL | Age: 66
End: 2021-05-06

## 2021-05-06 VITALS
HEART RATE: 60 BPM | TEMPERATURE: 97.3 F | OXYGEN SATURATION: 100 % | SYSTOLIC BLOOD PRESSURE: 129 MMHG | WEIGHT: 211 LBS | DIASTOLIC BLOOD PRESSURE: 76 MMHG | BODY MASS INDEX: 37.39 KG/M2 | HEIGHT: 63 IN | RESPIRATION RATE: 16 BRPM

## 2021-05-06 DIAGNOSIS — R10.9 ABDOMINAL CRAMPING: ICD-10-CM

## 2021-05-06 DIAGNOSIS — R19.7 DIARRHEA, UNSPECIFIED TYPE: ICD-10-CM

## 2021-05-06 PROCEDURE — 25010000002 PROPOFOL 10 MG/ML EMULSION: Performed by: NURSE ANESTHETIST, CERTIFIED REGISTERED

## 2021-05-06 PROCEDURE — 45380 COLONOSCOPY AND BIOPSY: CPT | Performed by: INTERNAL MEDICINE

## 2021-05-06 PROCEDURE — 88305 TISSUE EXAM BY PATHOLOGIST: CPT | Performed by: INTERNAL MEDICINE

## 2021-05-06 RX ORDER — SODIUM CHLORIDE 9 MG/ML
INJECTION, SOLUTION INTRAVENOUS CONTINUOUS PRN
Status: DISCONTINUED | OUTPATIENT
Start: 2021-05-06 | End: 2021-05-06 | Stop reason: SURG

## 2021-05-06 RX ORDER — PROPOFOL 10 MG/ML
VIAL (ML) INTRAVENOUS AS NEEDED
Status: DISCONTINUED | OUTPATIENT
Start: 2021-05-06 | End: 2021-05-06 | Stop reason: SURG

## 2021-05-06 RX ADMIN — SODIUM CHLORIDE 500 ML: 9 INJECTION, SOLUTION INTRAVENOUS at 10:41

## 2021-05-06 RX ADMIN — PROPOFOL 400 MG: 10 INJECTION, EMULSION INTRAVENOUS at 10:48

## 2021-05-06 NOTE — ANESTHESIA POSTPROCEDURE EVALUATION
Patient: Sada Casey    Procedure Summary     Date: 05/06/21 Room / Location: [facility] ENDOSCOPY 6 / [facility]    Anesthesia Start: 1046 Anesthesia Stop: 1106    Procedure: COLONOSCOPY WITH ANESTHESIA (N/A ) Diagnosis:       Diarrhea, unspecified type      Abdominal cramping      (Diarrhea, unspecified type [R19.7])      (Abdominal cramping [R10.9])    Surgeons: Shawnee Marin MD Provider: Neville Etienne CRNA    Anesthesia Type: MAC ASA Status: 3          Anesthesia Type: MAC    Vitals  No vitals data found for the desired time range.          Post Anesthesia Care and Evaluation    Patient location during evaluation: PHASE II  Patient participation: complete - patient participated  Level of consciousness: awake and alert  Pain management: adequate  Airway patency: patent  Anesthetic complications: No anesthetic complications    Cardiovascular status: acceptable  Respiratory status: acceptable  Hydration status: acceptable

## 2021-05-06 NOTE — ANESTHESIA PREPROCEDURE EVALUATION
Anesthesia Evaluation     Patient summary reviewed   no history of anesthetic complications:  NPO Solid Status: > 8 hours             Airway   Mallampati: II  Dental      Pulmonary    (+) sleep apnea on CPAP,   Cardiovascular   Exercise tolerance: excellent (>7 METS)    (+) hypertension,       Neuro/Psych- negative ROS  GI/Hepatic/Renal/Endo    (+) obesity, morbid obesity, GERD,      Musculoskeletal     Abdominal    Substance History      OB/GYN          Other                        Anesthesia Plan    ASA 3     MAC       Anesthetic plan, all risks, benefits, and alternatives have been provided, discussed and informed consent has been obtained with: patient.

## 2021-05-07 LAB
CYTO UR: NORMAL
LAB AP CASE REPORT: NORMAL
LAB AP CLINICAL INFORMATION: NORMAL
PATH REPORT.FINAL DX SPEC: NORMAL
PATH REPORT.GROSS SPEC: NORMAL

## 2021-07-12 ENCOUNTER — OFFICE VISIT (OUTPATIENT)
Dept: GASTROENTEROLOGY | Facility: CLINIC | Age: 66
End: 2021-07-12

## 2021-07-12 VITALS
HEART RATE: 70 BPM | HEIGHT: 63 IN | TEMPERATURE: 98.2 F | DIASTOLIC BLOOD PRESSURE: 78 MMHG | BODY MASS INDEX: 38.09 KG/M2 | OXYGEN SATURATION: 98 % | WEIGHT: 215 LBS | SYSTOLIC BLOOD PRESSURE: 130 MMHG

## 2021-07-12 DIAGNOSIS — R10.9 ABDOMINAL CRAMPING: ICD-10-CM

## 2021-07-12 DIAGNOSIS — R19.7 DIARRHEA, UNSPECIFIED TYPE: Primary | ICD-10-CM

## 2021-07-12 PROCEDURE — 99213 OFFICE O/P EST LOW 20 MIN: CPT | Performed by: NURSE PRACTITIONER

## 2021-07-12 RX ORDER — LOPERAMIDE HYDROCHLORIDE 2 MG/1
2 TABLET ORAL 4 TIMES DAILY PRN
Qty: 120 TABLET | Refills: 12 | Status: SHIPPED | OUTPATIENT
Start: 2021-07-12

## 2021-07-12 RX ORDER — DICYCLOMINE HYDROCHLORIDE 10 MG/1
10 CAPSULE ORAL
Qty: 120 CAPSULE | Refills: 11 | Status: SHIPPED | OUTPATIENT
Start: 2021-07-12 | End: 2022-06-27 | Stop reason: SDUPTHER

## 2021-07-12 RX ORDER — LOPERAMIDE HYDROCHLORIDE 2 MG/1
2 CAPSULE ORAL 3 TIMES DAILY
COMMUNITY
End: 2022-09-06 | Stop reason: SDUPTHER

## 2021-07-12 NOTE — PROGRESS NOTES
"Chief Complaint:   Chief Complaint   Patient presents with   • Follow-up     Pt presents today for diarrhea follow up-had colon 5/6/21; Pt states she is feeling better and that her diarrhea is \"finally under control with 3 medicines\"         Patient ID: Sada Casey is a 65 y.o. female     History of Present Illness: This is a very pleasant 65-year-old female who is here to follow-up status post colonoscopy for diarrhea.    The patient underwent colonoscopy on 5/6/2021 for clinically significant diarrhea of unexplained origin.  Colonoscopy revealed diverticulosis in the left colon biopsies taken to evaluate for microscopic colitis found to be normal.  Patient instructed to start liquid Imodium 1/2-1/4 dose before every meal and at bedtime as needed.  Stools previously ordered were negative for pathogens and C. Difficile.    Patient states today that she is feeling much better and her diarrhea is \"finally under control with 3 medicines.\"  She states she is taking the Imodium as directed and Bentyl.The patient denies any nausea, vomiting, epigastric pain, dysphagia, pyrosis or hematemesis.  The patient denies any fever or chills.  Denies any melena or hematochezia.  Denies any unintentional weight loss or loss of appetite.      Past Medical History:   Diagnosis Date   • Anxiety    • Arthritis    • Diverticulosis    • Eczema    • GERD (gastroesophageal reflux disease)    • History of DVT (deep vein thrombosis)     left leg, about 6 yrs   • Hyperlipidemia    • Hypertension    • Insomnia    • Right leg numbness    • Sleep apnea     cpap   • Stress incontinence    • TIA (transient ischemic attack)        Past Surgical History:   Procedure Laterality Date   • BREAST LUMPECTOMY Left    • CATARACT EXTRACTION WITH INTRAOCULAR LENS IMPLANT Bilateral    • COLONOSCOPY  12/15/2011    Diverticulosis; Repeat 7 years   • COLONOSCOPY N/A 2/1/2019    Diverticulosis in the left colon; The examination was otherwise normal on direct " and retroflexion views; No specimens collected; Repeat 10 years   • COLONOSCOPY N/A 5/6/2021    The examined portion of the ileum was normal; Diverticulosis in the left colon; The examination was otherwise normal on direct and retroflexion views; Biopsies were taken with a cold forceps from the entire colon for evaluation of microscopic colitis; Repeat 10 years   • ENDOSCOPY  12/15/2011    [facility]   • ENDOSCOPY N/A 11/18/2019    Normal esophagus; Normal stomach; Normal examined duodenum-biopsied   • HYSTERECTOMY     • STEROID INJECTION Bilateral 8/21/2018    Procedure: 2, 3 TARSOMETATARSAL JOINT INJECTION WITH FLUROSCOPIC GUIDANCE - BILATERAL FEET;  Surgeon: Senthil Curtis DPM;  Location:  [facility];  Service: Podiatry   • STEROID INJECTION Bilateral 1/15/2019    Procedure: 2, 3, TARSOMETATARSAL JOINT INJECTION WITH FLUROSCOPIC GUIDANCE- BILATERAL FEET;  Surgeon: Senthil Curtis DPM;  Location:  [facility];  Service: Podiatry         Current Outpatient Medications:   •  ARIPiprazole (ABILIFY) 5 MG tablet, Take 1 tablet by mouth Daily., Disp: , Rfl:   •  clotrimazole-betamethasone (LOTRISONE) 1-0.05 % cream, Apply  topically 2 (Two) Times a Day. (Patient taking differently: Apply  topically to the appropriate area as directed 2 (Two) Times a Day. As needed), Disp: 15 g, Rfl: 0  •  diclofenac (VOLTAREN) 75 MG EC tablet, Take 1 tablet by mouth Daily., Disp: , Rfl:   •  DULoxetine (CYMBALTA) 30 MG capsule, Take 60 mg by mouth Daily., Disp: , Rfl:   •  hydrochlorothiazide (MICROZIDE) 12.5 MG capsule, Take 12.5 mg by mouth every morning , Disp: , Rfl:   •  irbesartan (AVAPRO) 150 MG tablet, Take 150 mg by mouth daily , Disp: , Rfl:   •  loperamide (IMODIUM) 2 MG capsule, Take 2 mg by mouth 3 (Three) Times a Day., Disp: , Rfl:   •  metoprolol succinate XL (TOPROL-XL) 100 MG 24 hr tablet, Take 100 mg by mouth daily , Disp: , Rfl:   •  pantoprazole (PROTONIX) 40 MG EC tablet, Take 1 tablet by mouth Daily., Disp: 90 tablet,  "Rfl: 4  •  dicyclomine (BENTYL) 10 MG capsule, Take 1 capsule by mouth 4 (Four) Times a Day Before Meals & at Bedtime., Disp: 120 capsule, Rfl: 11  •  loperamide (Imodium A-D) 2 MG tablet, Take 1 tablet by mouth 4 (Four) Times a Day As Needed for Diarrhea., Disp: 120 tablet, Rfl: 12    Allergies   Allergen Reactions   • Acetaminophen Rash   • Naprosyn [Naproxen] Rash   • Nyquil Multi-Symptom [Pseudoeph-Doxylamine-Dm-Apap] Rash       Social History     Socioeconomic History   • Marital status:      Spouse name: Not on file   • Number of children: Not on file   • Years of education: Not on file   • Highest education level: Not on file   Tobacco Use   • Smoking status: Never Smoker   • Smokeless tobacco: Never Used   Vaping Use   • Vaping Use: Never used   Substance and Sexual Activity   • Alcohol use: Yes     Comment: Occasional   • Drug use: No   • Sexual activity: Defer       Family History   Problem Relation Age of Onset   • No Known Problems Father    • Coronary artery disease Mother    • No Known Problems Sister    • No Known Problems Sister    • No Known Problems Sister    • No Known Problems Sister    • Colon cancer Neg Hx    • Colon polyps Neg Hx    • Esophageal cancer Neg Hx    • Liver cancer Neg Hx    • Liver disease Neg Hx    • Rectal cancer Neg Hx    • Stomach cancer Neg Hx        Vitals:    07/12/21 0925   BP: 130/78   BP Location: Left arm   Patient Position: Sitting   Cuff Size: Adult   Pulse: 70   Temp: 98.2 °F (36.8 °C)   TempSrc: Infrared   SpO2: 98%   Weight: 97.5 kg (215 lb)   Height: 160 cm (63\")       Review of Systems:    General:    Present -feeling well   Skin:    Not Present-Rash   HEENT:     Not Present-Acute visual changes or Acute hearing changes   Neck :    Not Present- swollen glands   Genitourinary:      Not Present- burning, frequency, urgency hematuria, dysuria,   Cardiovascular:   Not Present-chest pain, palpitations, or pressure   Respiratory:   Not Present- shortness of " breath or cough   Gastrointestinal:  Musculoskeletal:  Neurological:  Psychiatric:   Present as mentioned in the HP    Not Present. Recent gait disturbances.    Not Present-Seizures and weakness in extremities.    Not Present- Anxiety or Depression.       Physical Exam:    General Appearance:    Alert, cooperative, in no acute distress   Psych:    Mood appropriate    Eyes:          conjunctivae and sclerae normal, no   icterus, no pallor   ENMT:    Ears appear intact with no abnormalities noted oral mucosa moist   Neck:   No adenopathy, supple, trachea midline, no thyromegaly, no   carotid bruit, no JVD    Cardiovascular:    Regular rhythm and normal rate, normal S1 and S2, no            murmur, no gallop, no rub, no click   Gastrointestinal:     Inspection normal.  Normal bowel sounds, no masses, no organomegaly, soft round non-tender, non-distended, no guarding, no rebound or tenderness. No hepatosplenomegaly.   Skin:   No bleeding, bruising or rash   Neurologic:   nonfocal           Assessment and Plan:  Assessment/Plan   Diagnoses and all orders for this visit:    1. Diarrhea, unspecified type (Primary)    2. Abdominal cramping    Other orders  -     dicyclomine (BENTYL) 10 MG capsule; Take 1 capsule by mouth 4 (Four) Times a Day Before Meals & at Bedtime.  Dispense: 120 capsule; Refill: 11  -     loperamide (Imodium A-D) 2 MG tablet; Take 1 tablet by mouth 4 (Four) Times a Day As Needed for Diarrhea.  Dispense: 120 tablet; Refill: 12      Refills for Bentyl and Imodium sent to patient's mail-in pharmacy.  Patient to follow-up with me in 1 year or sooner if needed     There are no Patient Instructions on file for this visit.    Next follow-up appointment          EMR Dragon/Transcription disclaimer:  Much of this encounter note is an electronic transcription/translation of spoken language to printed text. The electronic translation of spoken language may permit erroneous, or at times, nonsensical words or  phrases to be inadvertently transcribed; although I have reviewed the note for such errors, some may still exist.

## 2021-12-06 ENCOUNTER — HOSPITAL ENCOUNTER (OUTPATIENT)
Dept: WOMENS IMAGING | Age: 66
Discharge: HOME | End: 2021-12-06
Payer: MEDICARE

## 2021-12-06 ENCOUNTER — OFFICE VISIT (OUTPATIENT)
Dept: SURGERY | Age: 66
End: 2021-12-06
Payer: MEDICARE

## 2021-12-06 VITALS
SYSTOLIC BLOOD PRESSURE: 133 MMHG | WEIGHT: 222 LBS | BODY MASS INDEX: 39.34 KG/M2 | HEIGHT: 63 IN | HEART RATE: 77 BPM | TEMPERATURE: 98.7 F | DIASTOLIC BLOOD PRESSURE: 80 MMHG

## 2021-12-06 DIAGNOSIS — Z85.3 PERSONAL HISTORY OF MALIGNANT NEOPLASM OF BREAST: ICD-10-CM

## 2021-12-06 DIAGNOSIS — Z85.3 PERSONAL HISTORY OF MALIGNANT NEOPLASM OF BREAST: Primary | ICD-10-CM

## 2021-12-06 DIAGNOSIS — Z78.0 ASYMPTOMATIC AGE-RELATED POSTMENOPAUSAL STATE: ICD-10-CM

## 2021-12-06 PROCEDURE — 1090F PRES/ABSN URINE INCON ASSESS: CPT | Performed by: PHYSICIAN ASSISTANT

## 2021-12-06 PROCEDURE — G8484 FLU IMMUNIZE NO ADMIN: HCPCS | Performed by: PHYSICIAN ASSISTANT

## 2021-12-06 PROCEDURE — 4040F PNEUMOC VAC/ADMIN/RCVD: CPT | Performed by: PHYSICIAN ASSISTANT

## 2021-12-06 PROCEDURE — 3017F COLORECTAL CA SCREEN DOC REV: CPT | Performed by: PHYSICIAN ASSISTANT

## 2021-12-06 PROCEDURE — 99213 OFFICE O/P EST LOW 20 MIN: CPT | Performed by: PHYSICIAN ASSISTANT

## 2021-12-06 PROCEDURE — 1036F TOBACCO NON-USER: CPT | Performed by: PHYSICIAN ASSISTANT

## 2021-12-06 PROCEDURE — G8399 PT W/DXA RESULTS DOCUMENT: HCPCS | Performed by: PHYSICIAN ASSISTANT

## 2021-12-06 PROCEDURE — G8417 CALC BMI ABV UP PARAM F/U: HCPCS | Performed by: PHYSICIAN ASSISTANT

## 2021-12-06 PROCEDURE — 1123F ACP DISCUSS/DSCN MKR DOCD: CPT | Performed by: PHYSICIAN ASSISTANT

## 2021-12-06 PROCEDURE — G0279 TOMOSYNTHESIS, MAMMO: HCPCS

## 2021-12-06 PROCEDURE — G8427 DOCREV CUR MEDS BY ELIG CLIN: HCPCS | Performed by: PHYSICIAN ASSISTANT

## 2021-12-06 PROCEDURE — 77080 DXA BONE DENSITY AXIAL: CPT

## 2021-12-06 NOTE — PROGRESS NOTES
HISTORY OF PRESENT ILLNESS:    Ms. 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 ER is positive at 100%. Prelude DX demonstrates a 13% risk of all recurrence lowered to 7% with radiation therapy. It demonstrates a 7% invasive recurrence risk reduced to 5%. She is status post right lumpectomy with IORT on 12/30/2019        PATHOLOGY REVEALS:  FINAL DIAGNOSIS:    A.  Breast, right 2 o'clock, wire localized lumpectomy:  Ductal carcinoma in situ, high-grade with central necrosis  Ductal carcinoma in situ measures 4 mm in greatest linear dimension  Adjacent biopsy site  Margins of resection are negative for ductal carcinoma in situ. The closest margin of resection is the green inferior margin at 3 mm    B.  Breast, right, additional cranial margin:  Negative for ductal carcinoma in situ    C.  Breast, right, additional caudal margin:  Negative for ductal carcinoma in situ    D.  Breast, right, additional medial margin:  Small intraductal papilloma, 2 mm in greatest linear dimension, excised  Negative for ductal carcinoma in situ    E.  Breast, right, additional deep margin:  Negative for ductal carcinoma in situ    AJCC cancer stage:  pTis, pNX    EXAM: [facility]OS DIGITAL DIAGNOSTIC W OR WO CAD BILATERAL   HISTORY: Annual breast cancer screening and diagnostic follow-up of   RIGHT breast cancer   COMPARISON: 12/1/2020, 7/30/2020   FINDINGS:    Computer aided detection technology was utilized. 3-D tomosynthesis   views were obtained. Category C: The breast tissue is heterogeneously   dense, which may obscure small masses. Stable posttreatment change in the far posterior RIGHT breast. No   suspicious mass, calcifications, or architectural distortion.       Impression   1. No mammographic evidence of malignancy. Recommendation is for the   patient to return for routine mammography in one year or sooner, if   clinically indicated. 2. BI-RADS category 2: Benign findings        PHYSICAL EXAM:  BREAST EXAM:  The right lumpectomy site has healed well. There are fibrocystic changes throughout both breasts. There are no dominant masses, skin dimpling or nipple retraction. There is no axillary lymphadenopathy bilaterally. IMPRESSION:  Doing well s/p right lumpectomy and IORT-12/30/2019    PLAN:  I will see her in 1 year with bilateral mammograms. She will call with any concerns. 20 minutes spent, which includes face to face with patient, record review, evaluation, planning, and education as well as coordination of her care.

## 2021-12-17 ENCOUNTER — TRANSCRIBE ORDERS (OUTPATIENT)
Dept: ADMINISTRATIVE | Facility: HOSPITAL | Age: 66
End: 2021-12-17

## 2021-12-17 DIAGNOSIS — R94.5 ABNORMAL LIVER FUNCTION: Primary | ICD-10-CM

## 2022-05-06 DIAGNOSIS — K21.9 GASTROESOPHAGEAL REFLUX DISEASE: ICD-10-CM

## 2022-05-06 RX ORDER — PANTOPRAZOLE SODIUM 40 MG/1
TABLET, DELAYED RELEASE ORAL
Qty: 90 TABLET | Refills: 0 | Status: SHIPPED | OUTPATIENT
Start: 2022-05-06 | End: 2022-06-27 | Stop reason: SDUPTHER

## 2022-05-06 NOTE — TELEPHONE ENCOUNTER
Rx Refill Note  Requested Prescriptions     Pending Prescriptions Disp Refills   • pantoprazole (PROTONIX) 40 MG EC tablet [Pharmacy Med Name: PANTOPRAZOLE SODIUM DR TABS 40MG] 90 tablet 0     Sig: TAKE 1 TABLET DAILY      Last office visit with prescribing clinician: 7/12/2021      Next office visit with prescribing clinician: Visit date not found     Pt due for yearly OV 7/2022-pended 1 90-day refill to Dr. Marin as Jaqui is out of office. Placed note to pharmacy that pt was due for yearly OV before this refill runs out.              Lynn Spaulding MA  05/06/22, 08:30 CDT

## 2022-06-27 DIAGNOSIS — R19.7 DIARRHEA, UNSPECIFIED TYPE: ICD-10-CM

## 2022-06-27 DIAGNOSIS — K21.9 GASTROESOPHAGEAL REFLUX DISEASE: Primary | ICD-10-CM

## 2022-06-27 DIAGNOSIS — R10.9 ABDOMINAL CRAMPING: ICD-10-CM

## 2022-06-27 NOTE — TELEPHONE ENCOUNTER
Rx Refill Note  Requested Prescriptions     Pending Prescriptions Disp Refills   • pantoprazole (PROTONIX) 40 MG EC tablet 90 tablet 1     Sig: Take 1 tablet by mouth Daily.   • dicyclomine (BENTYL) 10 MG capsule 360 capsule 1     Sig: Take 1 capsule by mouth 4 (Four) Times a Day Before Meals & at Bedtime.      Last office visit with prescribing clinician: 7/12/2021      Next office visit with prescribing clinician: 9/6/2022    Pt is scheduled to come is for yearly OV this fall but will run out of medication before that time. I have pended enough refill to Dr. Marin to last until pt can be seen.     IP  Encounters:23}         Lynn Spaulding MA  06/27/22, 09:46 CDT

## 2022-06-29 RX ORDER — PANTOPRAZOLE SODIUM 40 MG/1
40 TABLET, DELAYED RELEASE ORAL DAILY
Qty: 90 TABLET | Refills: 1 | Status: SHIPPED | OUTPATIENT
Start: 2022-06-29 | End: 2022-09-06 | Stop reason: SDUPTHER

## 2022-06-29 RX ORDER — DICYCLOMINE HYDROCHLORIDE 10 MG/1
10 CAPSULE ORAL
Qty: 360 CAPSULE | Refills: 1 | Status: SHIPPED | OUTPATIENT
Start: 2022-06-29 | End: 2022-09-06 | Stop reason: SDUPTHER

## 2022-09-06 ENCOUNTER — OFFICE VISIT (OUTPATIENT)
Dept: GASTROENTEROLOGY | Facility: CLINIC | Age: 67
End: 2022-09-06

## 2022-09-06 VITALS
OXYGEN SATURATION: 96 % | HEIGHT: 63 IN | TEMPERATURE: 97.3 F | SYSTOLIC BLOOD PRESSURE: 122 MMHG | DIASTOLIC BLOOD PRESSURE: 78 MMHG | HEART RATE: 58 BPM | WEIGHT: 201 LBS | BODY MASS INDEX: 35.61 KG/M2

## 2022-09-06 DIAGNOSIS — R10.9 ABDOMINAL CRAMPING: ICD-10-CM

## 2022-09-06 DIAGNOSIS — K21.9 GASTROESOPHAGEAL REFLUX DISEASE, UNSPECIFIED WHETHER ESOPHAGITIS PRESENT: Primary | ICD-10-CM

## 2022-09-06 DIAGNOSIS — K21.9 GASTROESOPHAGEAL REFLUX DISEASE: ICD-10-CM

## 2022-09-06 DIAGNOSIS — R19.7 DIARRHEA, UNSPECIFIED TYPE: ICD-10-CM

## 2022-09-06 PROCEDURE — 99214 OFFICE O/P EST MOD 30 MIN: CPT | Performed by: NURSE PRACTITIONER

## 2022-09-06 RX ORDER — BUPROPION HYDROCHLORIDE 150 MG/1
1 TABLET ORAL DAILY
COMMUNITY
Start: 2022-08-29

## 2022-09-06 RX ORDER — DICYCLOMINE HYDROCHLORIDE 10 MG/1
10 CAPSULE ORAL
Qty: 360 CAPSULE | Refills: 1 | Status: SHIPPED | OUTPATIENT
Start: 2022-09-06

## 2022-09-06 RX ORDER — PANTOPRAZOLE SODIUM 40 MG/1
40 TABLET, DELAYED RELEASE ORAL DAILY
Qty: 90 TABLET | Refills: 3 | Status: SHIPPED | OUTPATIENT
Start: 2022-09-06

## 2022-09-06 NOTE — PROGRESS NOTES
"Primary Physician: Jennifer Hummel DO    Chief Complaint   Patient presents with   • Med Refill     Pt presents today for yearly OV for GERD and IBD-Needs refills on Pantoprazole and Dicyclomine; Pt states overall she feels good and the meds work well but she does occasionally have \"breakthrough\" issues        Subjective     Sada Casey is a 67 y.o. female.    HPI   GERD-  Pt has known gerd with hx of a HH.  Taking Protonix daily.  Feels like her UGI symptoms are under control.  Last endoscopy 11/18/2019 normal esophagus, normal stomach, normal duodenum with negative biopsies.      IBS with Diarrhea-  She will have occasional break through IBS symptoms with certain foods and then will have cramping and diarrhea.  For her chronic diarrhea she is maintained with Bentyl &  Immodium use.  Only taking 1 tablet of Bentyl 10mg about once day.  Last CScope 2/2019 unremarkable except left sided tics.  No rectal bleeding.      Past Medical History:   Diagnosis Date   • Anxiety    • Arthritis    • Diverticulosis    • Eczema    • GERD (gastroesophageal reflux disease)    • History of DVT (deep vein thrombosis)     left leg, about 6 yrs   • Hyperlipidemia    • Hypertension    • Insomnia    • Right leg numbness    • Sleep apnea     cpap   • Stress incontinence    • TIA (transient ischemic attack)        Past Surgical History:   Procedure Laterality Date   • BREAST LUMPECTOMY Left    • CATARACT EXTRACTION WITH INTRAOCULAR LENS IMPLANT Bilateral    • COLONOSCOPY  12/15/2011    Diverticulosis; Repeat 7 years   • COLONOSCOPY N/A 2/1/2019    Diverticulosis in the left colon; The examination was otherwise normal on direct and retroflexion views; No specimens collected; Repeat 10 years   • COLONOSCOPY N/A 5/6/2021    The examined portion of the ileum was normal; Diverticulosis in the left colon; The examination was otherwise normal on direct and retroflexion views; Biopsies were taken with a cold forceps from the entire colon " for evaluation of microscopic colitis; Repeat 10 years   • ENDOSCOPY  12/15/2011    HH   • ENDOSCOPY N/A 11/18/2019    Normal esophagus; Normal stomach; Normal examined duodenum-biopsied   • HYSTERECTOMY     • STEROID INJECTION Bilateral 8/21/2018    Procedure: 2, 3 TARSOMETATARSAL JOINT INJECTION WITH FLUROSCOPIC GUIDANCE - BILATERAL FEET;  Surgeon: Senthil Curtis DPM;  Location:  [facility];  Service: Podiatry   • STEROID INJECTION Bilateral 1/15/2019    Procedure: 2, 3, TARSOMETATARSAL JOINT INJECTION WITH FLUROSCOPIC GUIDANCE- BILATERAL FEET;  Surgeon: Senthil Curtis DPM;  Location:  [facility];  Service: Podiatry        Current Outpatient Medications:   •  buPROPion XL (WELLBUTRIN XL) 150 MG 24 hr tablet, Take 1 tablet by mouth Daily., Disp: , Rfl:   •  clotrimazole-betamethasone (LOTRISONE) 1-0.05 % cream, Apply  topically 2 (Two) Times a Day. (Patient taking differently: Apply  topically to the appropriate area as directed 2 (Two) Times a Day. As needed), Disp: 15 g, Rfl: 0  •  diclofenac (VOLTAREN) 75 MG EC tablet, Take 1 tablet by mouth Daily., Disp: , Rfl:   •  dicyclomine (BENTYL) 10 MG capsule, Take 1 capsule by mouth 4 (Four) Times a Day Before Meals & at Bedtime., Disp: 360 capsule, Rfl: 1  •  hydrochlorothiazide (MICROZIDE) 12.5 MG capsule, Take 12.5 mg by mouth every morning , Disp: , Rfl:   •  irbesartan (AVAPRO) 150 MG tablet, Take 150 mg by mouth daily , Disp: , Rfl:   •  loperamide (Imodium A-D) 2 MG tablet, Take 1 tablet by mouth 4 (Four) Times a Day As Needed for Diarrhea., Disp: 120 tablet, Rfl: 12  •  metoprolol succinate XL (TOPROL-XL) 100 MG 24 hr tablet, Take 100 mg by mouth daily , Disp: , Rfl:   •  pantoprazole (PROTONIX) 40 MG EC tablet, Take 1 tablet by mouth Daily., Disp: 90 tablet, Rfl: 3    Allergies   Allergen Reactions   • Acetaminophen Rash   • Naprosyn [Naproxen] Rash   • Nyquil Multi-Symptom [Pseudoeph-Doxylamine-Dm-Apap] Rash       Social History     Socioeconomic History  "  • Marital status:    Tobacco Use   • Smoking status: Never Smoker   • Smokeless tobacco: Never Used   Vaping Use   • Vaping Use: Never used   Substance and Sexual Activity   • Alcohol use: Yes     Comment: Occasional   • Drug use: No   • Sexual activity: Defer       Family History   Problem Relation Age of Onset   • No Known Problems Father    • Coronary artery disease Mother    • No Known Problems Sister    • No Known Problems Sister    • No Known Problems Sister    • No Known Problems Sister    • Colon cancer Neg Hx    • Colon polyps Neg Hx    • Esophageal cancer Neg Hx    • Liver cancer Neg Hx    • Liver disease Neg Hx    • Rectal cancer Neg Hx    • Stomach cancer Neg Hx        Review of Systems    Objective     /78 (BP Location: Left arm, Patient Position: Sitting, Cuff Size: Adult)   Pulse 58   Temp 97.3 °F (36.3 °C) (Infrared)   Ht 160 cm (63\")   Wt 91.2 kg (201 lb)   SpO2 96%   Breastfeeding No   BMI 35.61 kg/m²     Physical Exam        IMPRESSION/PLAN:    Assessment & Plan      Problem List Items Addressed This Visit        Gastrointestinal Abdominal     Gastroesophageal reflux disease - Primary    Overview     Daily Protonix.  Last endoscopy 11/2019 unremarkable.         Current Assessment & Plan     No current UGI symptoms         Relevant Medications    pantoprazole (PROTONIX) 40 MG EC tablet    dicyclomine (BENTYL) 10 MG capsule    Diarrhea    Overview     Chronic suspected to be related to IBS.    Last CScope 2/2019 unremarkable and given 10 year recall.         Relevant Medications    dicyclomine (BENTYL) 10 MG capsule    Abdominal cramping    Overview     Helped with Bentyl 10 once daily.             Because of her chronic use of Protonix I have recommended she stay up to date on her  Bone Density Study thru her PCP.  She verbalizes understanding and states she had one last year.  Continue Protonix and Bentyl  F/U 1 year  Recall CScope 2029  Call with any changes    "         Lina Allen, APRN  09/06/22  15:10 CDT    Much of this encounter note is an electronic transcription/translation of spoken language to printed text. The electronic translation of spoken language may permit erroneous, or at times, nonsensical words or phrases to be inadvertently transcribed; although I have reviewed the note for such errors, some may still exist.

## 2022-12-07 ENCOUNTER — HOSPITAL ENCOUNTER (OUTPATIENT)
Dept: WOMENS IMAGING | Age: 67
Discharge: HOME | End: 2022-12-07

## 2022-12-07 DIAGNOSIS — Z85.3 PERSONAL HISTORY OF MALIGNANT NEOPLASM OF BREAST: ICD-10-CM

## 2022-12-12 ENCOUNTER — TELEPHONE (OUTPATIENT)
Dept: SURGERY | Age: 67
End: 2022-12-12

## 2022-12-12 NOTE — TELEPHONE ENCOUNTER
Patient needs Mammogram order faxed over to [facility]. Patient has a mammogram scheduled at 9am tomorrow. Please fax order as soon as possible. Thank you!

## 2022-12-12 NOTE — TELEPHONE ENCOUNTER
[facility] and they already have the order and called the patient to let her know they already have the order

## 2022-12-13 ENCOUNTER — OFFICE VISIT (OUTPATIENT)
Dept: SURGERY | Age: 67
End: 2022-12-13
Payer: MEDICARE

## 2022-12-13 VITALS
BODY MASS INDEX: 36.57 KG/M2 | WEIGHT: 206.4 LBS | HEART RATE: 60 BPM | TEMPERATURE: 98.1 F | OXYGEN SATURATION: 96 % | HEIGHT: 63 IN

## 2022-12-13 DIAGNOSIS — Z85.3 PERSONAL HISTORY OF MALIGNANT NEOPLASM OF BREAST: Primary | ICD-10-CM

## 2022-12-13 PROCEDURE — 1036F TOBACCO NON-USER: CPT | Performed by: PHYSICIAN ASSISTANT

## 2022-12-13 PROCEDURE — G8427 DOCREV CUR MEDS BY ELIG CLIN: HCPCS | Performed by: PHYSICIAN ASSISTANT

## 2022-12-13 PROCEDURE — G8399 PT W/DXA RESULTS DOCUMENT: HCPCS | Performed by: PHYSICIAN ASSISTANT

## 2022-12-13 PROCEDURE — 1090F PRES/ABSN URINE INCON ASSESS: CPT | Performed by: PHYSICIAN ASSISTANT

## 2022-12-13 PROCEDURE — G8419 CALC BMI OUT NRM PARAM NOF/U: HCPCS | Performed by: PHYSICIAN ASSISTANT

## 2022-12-13 PROCEDURE — G8484 FLU IMMUNIZE NO ADMIN: HCPCS | Performed by: PHYSICIAN ASSISTANT

## 2022-12-13 PROCEDURE — 3017F COLORECTAL CA SCREEN DOC REV: CPT | Performed by: PHYSICIAN ASSISTANT

## 2022-12-13 PROCEDURE — 1123F ACP DISCUSS/DSCN MKR DOCD: CPT | Performed by: PHYSICIAN ASSISTANT

## 2022-12-13 PROCEDURE — 99213 OFFICE O/P EST LOW 20 MIN: CPT | Performed by: PHYSICIAN ASSISTANT

## 2022-12-13 RX ORDER — BUPROPION HYDROCHLORIDE 150 MG/1
1 TABLET ORAL DAILY
COMMUNITY
Start: 2022-08-29

## 2022-12-13 NOTE — PROGRESS NOTES
HISTORY OF PRESENT ILLNESS:    Ms. 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 ER is positive at 100%. Prelude DX demonstrates a 13% risk of all recurrence lowered to 7% with radiation therapy. It demonstrates a 7% invasive recurrence risk reduced to 5%. She is status post right lumpectomy with IORT on 12/30/2019        PATHOLOGY REVEALS:  FINAL DIAGNOSIS:    A. Breast, right 2 o'clock, wire localized lumpectomy:  Ductal carcinoma in situ, high-grade with central necrosis  Ductal carcinoma in situ measures 4 mm in greatest linear dimension  Adjacent biopsy site  Margins of resection are negative for ductal carcinoma in situ. The closest margin of resection is the green inferior margin at 3 mm    B. Breast, right, additional cranial margin:  Negative for ductal carcinoma in situ    C. Breast, right, additional caudal margin:  Negative for ductal carcinoma in situ    D. Breast, right, additional medial margin:  Small intraductal papilloma, 2 mm in greatest linear dimension, excised  Negative for ductal carcinoma in situ    E. Breast, right, additional deep margin:  Negative for ductal carcinoma in situ    AJCC cancer stage:  pTis, pNX        PHYSICAL EXAM:  BREAST EXAM:  The right lumpectomy site has healed well. There are fibrocystic changes throughout both breasts. There are no dominant masses, skin dimpling or nipple retraction. There is no axillary lymphadenopathy bilaterally. IMPRESSION:  Doing well s/p right lumpectomy and IORT-12/30/2019    PLAN:  I will see her in 1 year with bilateral mammograms. She will call with any concerns. 20 minutes spent, which includes face to face with patient, record review, evaluation, planning, and education as well as coordination of her care.

## 2023-10-23 DIAGNOSIS — K21.9 GASTROESOPHAGEAL REFLUX DISEASE: ICD-10-CM

## 2023-10-23 RX ORDER — PANTOPRAZOLE SODIUM 40 MG/1
40 TABLET, DELAYED RELEASE ORAL DAILY
Qty: 90 TABLET | Refills: 0 | Status: SHIPPED | OUTPATIENT
Start: 2023-10-23

## 2023-10-23 NOTE — TELEPHONE ENCOUNTER
Rx Refill Note  Requested Prescriptions     Pending Prescriptions Disp Refills    pantoprazole (PROTONIX) 40 MG EC tablet [Pharmacy Med Name: PANTOPRAZOLE SODIUM DR TABS 40MG] 90 tablet 3     Sig: TAKE 1 TABLET DAILY      Last office visit with prescribing clinician: 9/6/2022     Last telemedicine visit with prescribing clinician: Visit date not found     Next office visit with prescribing clinician: 11/28/2023     Pt has upcoming OV with Denise-pended 1 90-day refill to her to last until pt's appt. We will send in more refills at that time.                           Would you like a call back once the refill request has been completed: [] Yes [] No    If the office needs to give you a call back, can they leave a voicemail: [] Yes [] No    Lynn Spaulding MA  10/23/23, 08:15 CDT

## 2023-11-28 ENCOUNTER — OFFICE VISIT (OUTPATIENT)
Dept: GASTROENTEROLOGY | Facility: CLINIC | Age: 68
End: 2023-11-28
Payer: MEDICARE

## 2023-11-28 VITALS
BODY MASS INDEX: 37.21 KG/M2 | SYSTOLIC BLOOD PRESSURE: 118 MMHG | DIASTOLIC BLOOD PRESSURE: 78 MMHG | OXYGEN SATURATION: 96 % | WEIGHT: 210 LBS | HEART RATE: 66 BPM | HEIGHT: 63 IN | TEMPERATURE: 97.8 F

## 2023-11-28 DIAGNOSIS — K21.9 GASTROESOPHAGEAL REFLUX DISEASE: ICD-10-CM

## 2023-11-28 PROBLEM — K58.0 IRRITABLE BOWEL SYNDROME WITH DIARRHEA: Status: ACTIVE | Noted: 2023-11-28

## 2023-11-28 PROCEDURE — 1159F MED LIST DOCD IN RCRD: CPT | Performed by: NURSE PRACTITIONER

## 2023-11-28 PROCEDURE — 1160F RVW MEDS BY RX/DR IN RCRD: CPT | Performed by: NURSE PRACTITIONER

## 2023-11-28 PROCEDURE — 99213 OFFICE O/P EST LOW 20 MIN: CPT | Performed by: NURSE PRACTITIONER

## 2023-11-28 RX ORDER — EZETIMIBE 10 MG/1
10 TABLET ORAL DAILY
COMMUNITY
Start: 2023-11-03

## 2023-11-28 RX ORDER — PANTOPRAZOLE SODIUM 40 MG/1
40 TABLET, DELAYED RELEASE ORAL DAILY
Qty: 90 TABLET | Refills: 3 | Status: SHIPPED | OUTPATIENT
Start: 2023-11-28

## 2023-11-28 RX ORDER — FLUTICASONE PROPIONATE 50 MCG
2 SPRAY, SUSPENSION (ML) NASAL DAILY
COMMUNITY
Start: 2023-11-03

## 2023-11-28 RX ORDER — CELECOXIB 200 MG/1
200 CAPSULE ORAL DAILY
COMMUNITY
Start: 2023-11-03

## 2023-11-28 NOTE — PROGRESS NOTES
Primary Physician: Jennifer Hummel, DO    Chief Complaint   Patient presents with    Med Refill     Pt presents today for yearly OV for GERD and IBS-Needs 90-day refills on Pantoprazole but states she has plenty of Dicyclomine at home; Pt states overall she feels good-especially since she has changed arthritis medications       Subjective     Sada Casey is a 68 y.o. female.    HPI  GERD  Pt having NO GI issues at this time. Her PCP switched her from Voltaren over to Celebrex and that seems to be a bit gentler on her stomach.  No breakthrough acid reflux. No dypshagia.  She limits her carbohydrates and tries to limit her gluten.  Currently taking pantoprazole 40 mg once daily.  She is in need of a refill of this today I have sent that over to her mail in pharmacy.    Last endoscopy 11/18/2019 unremarkable with small bowel biopsies showin increased intraepithelial lymphocytes. Genetic testing showed 1 marker positive for celiac and the other negative, giving her an increased risk for Celiac over the general population. Educated on avoiding gluten.      IBS-D  Pt up to date on her colonoscopy.  Pt has maintained her IBS with Bentyl as the use of prn Immodium. She is only taking 1 Bentyl per day & 2 Immodium per day.  Last colonoscopy 5/6/2021: examined portion of the ileum was normal. 10 year recall.  No blood per rectum to be reported.    Past Medical History:   Diagnosis Date    Anxiety     Arthritis     Diverticulosis     Eczema     GERD (gastroesophageal reflux disease)     History of DVT (deep vein thrombosis)     left leg, about 6 yrs    Hyperlipidemia     Hypertension     Insomnia     Right leg numbness     Sleep apnea     cpap    Stress incontinence     TIA (transient ischemic attack)        Past Surgical History:   Procedure Laterality Date    BREAST LUMPECTOMY Left     CATARACT EXTRACTION WITH INTRAOCULAR LENS IMPLANT Bilateral     COLONOSCOPY  12/15/2011    Diverticulosis; Repeat 7 years     COLONOSCOPY N/A 2/1/2019    Diverticulosis in the left colon; The examination was otherwise normal on direct and retroflexion views; No specimens collected; Repeat 10 years    COLONOSCOPY N/A 5/6/2021    The examined portion of the ileum was normal; Diverticulosis in the left colon; The examination was otherwise normal on direct and retroflexion views; Biopsies were taken with a cold forceps from the entire colon for evaluation of microscopic colitis; Repeat 10 years    ENDOSCOPY  12/15/2011        ENDOSCOPY N/A 11/18/2019    Normal esophagus; Normal stomach; Normal examined duodenum-biopsied    HYSTERECTOMY      STEROID INJECTION Bilateral 8/21/2018    Procedure: 2, 3 TARSOMETATARSAL JOINT INJECTION WITH FLUROSCOPIC GUIDANCE - BILATERAL FEET;  Surgeon: Senthil Curtis DPM;  Location:  [facility];  Service: Podiatry    STEROID INJECTION Bilateral 1/15/2019    Procedure: 2, 3, TARSOMETATARSAL JOINT INJECTION WITH FLUROSCOPIC GUIDANCE- BILATERAL FEET;  Surgeon: Senthil Curtis DPM;  Location:  [facility];  Service: Podiatry        Current Outpatient Medications:     buPROPion XL (WELLBUTRIN XL) 150 MG 24 hr tablet, Take 1 tablet by mouth Daily., Disp: , Rfl:     celecoxib (CeleBREX) 200 MG capsule, Take 1 capsule by mouth Daily., Disp: , Rfl:     clotrimazole-betamethasone (LOTRISONE) 1-0.05 % cream, Apply  topically 2 (Two) Times a Day. (Patient taking differently: Apply  topically to the appropriate area as directed 2 (Two) Times a Day. As needed), Disp: 15 g, Rfl: 0    dicyclomine (BENTYL) 10 MG capsule, Take 1 capsule by mouth 4 (Four) Times a Day Before Meals & at Bedtime., Disp: 360 capsule, Rfl: 1    ezetimibe (ZETIA) 10 MG tablet, Take 1 tablet by mouth Daily., Disp: , Rfl:     fluticasone (FLONASE) 50 MCG/ACT nasal spray, 2 sprays into the nostril(s) as directed by provider Daily., Disp: , Rfl:     hydrochlorothiazide (MICROZIDE) 12.5 MG capsule, Take 12.5 mg by mouth every morning , Disp: , Rfl:      "irbesartan (AVAPRO) 150 MG tablet, Take 150 mg by mouth daily , Disp: , Rfl:     loperamide (Imodium A-D) 2 MG tablet, Take 1 tablet by mouth 4 (Four) Times a Day As Needed for Diarrhea. (Patient taking differently: Take 1 tablet by mouth Daily.), Disp: 120 tablet, Rfl: 12    metoprolol succinate XL (TOPROL-XL) 100 MG 24 hr tablet, Take 100 mg by mouth daily , Disp: , Rfl:     pantoprazole (PROTONIX) 40 MG EC tablet, Take 1 tablet by mouth Daily., Disp: 90 tablet, Rfl: 3    Allergies   Allergen Reactions    Acetaminophen Rash    Naprosyn [Naproxen] Rash    Nyquil Multi-Symptom [Pseudoeph-Doxylamine-Dm-Apap] Rash       Social History     Socioeconomic History    Marital status:    Tobacco Use    Smoking status: Never    Smokeless tobacco: Never   Vaping Use    Vaping Use: Never used   Substance and Sexual Activity    Alcohol use: Yes     Comment: Occasionally    Drug use: No    Sexual activity: Defer       Family History   Problem Relation Age of Onset    No Known Problems Father     Coronary artery disease Mother     No Known Problems Sister     No Known Problems Sister     No Known Problems Sister     No Known Problems Sister     Colon cancer Neg Hx     Colon polyps Neg Hx     Esophageal cancer Neg Hx     Liver cancer Neg Hx     Liver disease Neg Hx     Rectal cancer Neg Hx     Stomach cancer Neg Hx        Review of Systems   Constitutional:  Negative for unexpected weight change.   HENT:  Negative for trouble swallowing.        Objective     /78 (BP Location: Left arm, Patient Position: Sitting, Cuff Size: Adult)   Pulse 66   Temp 97.8 °F (36.6 °C) (Infrared)   Ht 160 cm (63\")   Wt 95.3 kg (210 lb)   SpO2 96%   Breastfeeding No   BMI 37.20 kg/m²     Physical Exam  Vitals reviewed.   Constitutional:       Appearance: Normal appearance.   Neurological:      Mental Status: She is alert.               IMPRESSION/PLAN:    Assessment & Plan      Problem List Items Addressed This Visit          " Gastrointestinal Abdominal     Gastroesophageal reflux disease    Overview     Daily Protonix.  Last endoscopy 11/2019 unremarkable.  Asymptomatic with no current upper GI symptoms at this time.         Current Assessment & Plan     Protonix refilled today.         Relevant Medications    pantoprazole (PROTONIX) 40 MG EC tablet     Follow Up as needed              Lina Allen, APRN  11/28/23  11:19 CST    Part of this note may be an electronic transcription/translation of spoken language to printed text.

## 2024-08-23 DIAGNOSIS — R19.7 DIARRHEA, UNSPECIFIED TYPE: ICD-10-CM

## 2024-08-23 RX ORDER — DICYCLOMINE HYDROCHLORIDE 10 MG/1
10 CAPSULE ORAL
Qty: 360 CAPSULE | Refills: 1 | Status: SHIPPED | OUTPATIENT
Start: 2024-08-23

## 2024-08-23 NOTE — TELEPHONE ENCOUNTER
Rx Refill Note  Requested Prescriptions      No prescriptions requested or ordered in this encounter      Last office visit with prescribing clinician: 11/28/2023 with Denise     Last telemedicine visit with prescribing clinician: Visit date not found     Next office visit with prescribing clinician: Visit date not found     Pt called me requesting refills on her Dicyclomine. I reminded her she is due for yearly OV later this fall-she wants to go ahead and set that up so I transferred her to [facility]. I have pended refills to Dr. Marin as Denise has already left for the day. Pt advised to call me back with any further questions/problems.                          Would you like a call back once the refill request has been completed: [] Yes [] No    If the office needs to give you a call back, can they leave a voicemail: [] Yes [] No    Lynn Spaulding MA  08/23/24, 10:52 CDT

## 2024-11-21 ENCOUNTER — OFFICE VISIT (OUTPATIENT)
Dept: GASTROENTEROLOGY | Facility: CLINIC | Age: 69
End: 2024-11-21
Payer: MEDICARE

## 2024-11-21 VITALS
WEIGHT: 217 LBS | SYSTOLIC BLOOD PRESSURE: 138 MMHG | BODY MASS INDEX: 38.45 KG/M2 | DIASTOLIC BLOOD PRESSURE: 84 MMHG | TEMPERATURE: 98 F | OXYGEN SATURATION: 97 % | HEIGHT: 63 IN | HEART RATE: 58 BPM

## 2024-11-21 DIAGNOSIS — R19.7 DIARRHEA, UNSPECIFIED TYPE: ICD-10-CM

## 2024-11-21 DIAGNOSIS — K21.9 GASTROESOPHAGEAL REFLUX DISEASE: ICD-10-CM

## 2024-11-21 PROCEDURE — 99214 OFFICE O/P EST MOD 30 MIN: CPT | Performed by: NURSE PRACTITIONER

## 2024-11-21 PROCEDURE — 1160F RVW MEDS BY RX/DR IN RCRD: CPT | Performed by: NURSE PRACTITIONER

## 2024-11-21 PROCEDURE — 1159F MED LIST DOCD IN RCRD: CPT | Performed by: NURSE PRACTITIONER

## 2024-11-21 RX ORDER — METFORMIN HYDROCHLORIDE 500 MG/1
1 TABLET, EXTENDED RELEASE ORAL DAILY
COMMUNITY
Start: 2024-10-22

## 2024-11-21 RX ORDER — PANTOPRAZOLE SODIUM 40 MG/1
40 TABLET, DELAYED RELEASE ORAL DAILY
Qty: 90 TABLET | Refills: 3 | Status: SHIPPED | OUTPATIENT
Start: 2024-11-21

## 2024-11-21 RX ORDER — CHOLECALCIFEROL (VITAMIN D3) 25 MCG
1000 TABLET ORAL DAILY
COMMUNITY

## 2024-11-21 RX ORDER — CITALOPRAM HYDROBROMIDE 20 MG/1
20 TABLET ORAL DAILY
COMMUNITY
Start: 2024-11-07

## 2024-11-21 RX ORDER — DICYCLOMINE HYDROCHLORIDE 10 MG/1
10 CAPSULE ORAL
Qty: 360 CAPSULE | Refills: 3 | Status: SHIPPED | OUTPATIENT
Start: 2024-11-21

## 2024-11-21 NOTE — PROGRESS NOTES
Primary Physician: Jennifer Hummel, DO    Chief Complaint   Patient presents with    Med Refill     Pt presents today for yearly OV for GERD and IBS-Needs 90-day refills on Pantoprazole and Dicyclomine; Pt states she was doing well only taking her Bentyl once daily until a couple of weeks ago-states she started having some right-sided abdominal pain and gas after eating certain foods-has increased her Bentyl up to QID and that has helped        Subjective     Sada Casey is a 69 y.o. female.    HPI  GERD  Patient here for her yearly office visit.  She has a history of acid reflux disease.    11/18/2019 endoscopy unremarkable with small bowel biopsy showing increased intraepithelial lymphocytes.  Genetic testing showed 1 marker positive for celiac and the other negative, giving her an increased risk for celiac over the general population.  She has previously been educated to avoid gluten.    She is maintained on pantoprazole 40 mg once daily.  She does need a refill of this.  No acid reflux. NO dysphagia.  Overall all upper GI symptoms are under control.    I have called Jennifer Hummel's office tried to get a copy of the most recent labs so I can review her creatinine.  12/6/2021 normal bone density study. Pt reports most recent bone density study in March 2024 and she reports it was normal.    IBS-D  Pt has been having some increased gas.  After increasing her Bentyl this has resolved that pain from the gas pockets.  She is now taking Bentyl up to 4 times daily.  She still has some loose stools. Typically she has 1 BM per day but she can have up to 3 times per day if she is having a bad day.    Patient up-to-date on colonoscopy.  She does take Bentyl as needed as well as Imodium as needed for her IBS symptoms.  5/6/2021 colonoscopy was normal and a 10-year recall was given.    Past Medical History:   Diagnosis Date    Anxiety     Arthritis     Diverticulosis     Eczema     GERD (gastroesophageal reflux  disease)     History of DVT (deep vein thrombosis)     left leg, about 6 yrs    Hyperlipidemia     Hypertension     Insomnia     Right leg numbness     Sleep apnea     cpap    Stress incontinence     TIA (transient ischemic attack)        Past Surgical History:   Procedure Laterality Date    BREAST LUMPECTOMY Left     CATARACT EXTRACTION WITH INTRAOCULAR LENS IMPLANT Bilateral     COLONOSCOPY  12/15/2011    Diverticulosis; Repeat 7 years    COLONOSCOPY N/A 2/1/2019    Diverticulosis in the left colon; The examination was otherwise normal on direct and retroflexion views; No specimens collected; Repeat 10 years    COLONOSCOPY N/A 5/6/2021    The examined portion of the ileum was normal; Diverticulosis in the left colon; The examination was otherwise normal on direct and retroflexion views; Biopsies were taken with a cold forceps from the entire colon for evaluation of microscopic colitis; Repeat 10 years    ENDOSCOPY  12/15/2011        ENDOSCOPY N/A 11/18/2019    Normal esophagus; Normal stomach; Normal examined duodenum-biopsied    HYSTERECTOMY      STEROID INJECTION Bilateral 8/21/2018    Procedure: 2, 3 TARSOMETATARSAL JOINT INJECTION WITH FLUROSCOPIC GUIDANCE - BILATERAL FEET;  Surgeon: Senthil Curtis DPM;  Location:  [facility];  Service: Podiatry    STEROID INJECTION Bilateral 1/15/2019    Procedure: 2, 3, TARSOMETATARSAL JOINT INJECTION WITH FLUROSCOPIC GUIDANCE- BILATERAL FEET;  Surgeon: Senthil Curtis DPM;  Location:  [facility];  Service: Podiatry        Current Outpatient Medications:     celecoxib (CeleBREX) 200 MG capsule, Take 1 capsule by mouth 2 (Two) Times a Day., Disp: , Rfl:     Cholecalciferol 25 MCG (1000 UT) tablet, Take 1 tablet by mouth Daily., Disp: , Rfl:     citalopram (CeleXA) 20 MG tablet, Take 1 tablet by mouth Daily., Disp: , Rfl:     clotrimazole-betamethasone (LOTRISONE) 1-0.05 % cream, Apply  topically 2 (Two) Times a Day. (Patient taking differently: Apply  topically to the  appropriate area as directed 2 (Two) Times a Day. As needed), Disp: 15 g, Rfl: 0    dicyclomine (BENTYL) 10 MG capsule, Take 1 capsule by mouth 4 (Four) Times a Day Before Meals & at Bedtime., Disp: 360 capsule, Rfl: 3    ezetimibe (ZETIA) 10 MG tablet, Take 1 tablet by mouth Daily., Disp: , Rfl:     fluticasone (FLONASE) 50 MCG/ACT nasal spray, Administer 2 sprays into the nostril(s) as directed by provider As Needed., Disp: , Rfl:     hydrochlorothiazide (MICROZIDE) 12.5 MG capsule, Take 12.5 mg by mouth every morning , Disp: , Rfl:     irbesartan (AVAPRO) 150 MG tablet, Take 150 mg by mouth daily , Disp: , Rfl:     loperamide (Imodium A-D) 2 MG tablet, Take 1 tablet by mouth 4 (Four) Times a Day As Needed for Diarrhea. (Patient taking differently: Take 1 tablet by mouth As Needed.), Disp: 120 tablet, Rfl: 12    metFORMIN ER (GLUCOPHAGE-XR) 500 MG 24 hr tablet, Take 1 tablet by mouth Daily., Disp: , Rfl:     metoprolol succinate XL (TOPROL-XL) 100 MG 24 hr tablet, Take 100 mg by mouth daily , Disp: , Rfl:     pantoprazole (PROTONIX) 40 MG EC tablet, Take 1 tablet by mouth Daily., Disp: 90 tablet, Rfl: 3    Allergies   Allergen Reactions    Nyquil Multi-Symptom [Pseudoeph-Doxylamine-Dm-Apap] Rash       Social History     Socioeconomic History    Marital status:    Tobacco Use    Smoking status: Never    Smokeless tobacco: Never   Vaping Use    Vaping status: Never Used   Substance and Sexual Activity    Alcohol use: Yes     Comment: Occasionally    Drug use: No    Sexual activity: Defer       Family History   Problem Relation Age of Onset    No Known Problems Father     Coronary artery disease Mother     No Known Problems Sister     No Known Problems Sister     No Known Problems Sister     No Known Problems Sister     Colon cancer Neg Hx     Colon polyps Neg Hx     Esophageal cancer Neg Hx     Liver cancer Neg Hx     Liver disease Neg Hx     Rectal cancer Neg Hx     Stomach cancer Neg Hx        Review of  "Systems   Constitutional:  Negative for unexpected weight change.   HENT:  Negative for trouble swallowing.        Objective     /84 (BP Location: Left arm, Patient Position: Sitting, Cuff Size: Adult)   Pulse 58   Temp 98 °F (36.7 °C) (Infrared)   Ht 160 cm (63\")   Wt 98.4 kg (217 lb)   SpO2 97%   Breastfeeding No   BMI 38.44 kg/m²     Physical Exam  Vitals reviewed.   Constitutional:       Appearance: Normal appearance.   Neurological:      Mental Status: She is alert.             IMPRESSION/PLAN:    Assessment & Plan      Problem List Items Addressed This Visit       Gastroesophageal reflux disease    Overview     Daily Protonix.  Last endoscopy 11/2019 unremarkable.  Asymptomatic with no current upper GI symptoms at this time.         Current Assessment & Plan     Protonix will be filled today.  I am calling her PCP for recent labs to review creatinine.  I have encouraged patient to keep up-to-date on her bone density studies which she is at this time.         Relevant Medications    pantoprazole (PROTONIX) 40 MG EC tablet    dicyclomine (BENTYL) 10 MG capsule    Diarrhea    Overview     Chronic suspected to be related to IBS.  Bentyl has helped.  Last CScope 2/2019 unremarkable and given 10 year recall.         Current Assessment & Plan     Refilled Bentyl today         Relevant Medications    dicyclomine (BENTYL) 10 MG capsule     Continue Bentyl.  Continue Protonix.  1 year follow up              Lina Allen, APRN  11/21/24  10:19 CST    Part of this note may be an electronic transcription/translation of spoken language to printed text.      "

## 2024-11-21 NOTE — ASSESSMENT & PLAN NOTE
Protonix will be filled today.  I am calling her PCP for recent labs to review creatinine.  I have encouraged patient to keep up-to-date on her bone density studies which she is at this time.

## 2024-12-14 ENCOUNTER — APPOINTMENT (OUTPATIENT)
Dept: CT IMAGING | Facility: HOSPITAL | Age: 69
End: 2024-12-14
Payer: MEDICARE

## 2024-12-14 ENCOUNTER — HOSPITAL ENCOUNTER (OUTPATIENT)
Facility: HOSPITAL | Age: 69
Setting detail: OBSERVATION
Discharge: HOME | End: 2024-12-20
Attending: STUDENT IN AN ORGANIZED HEALTH CARE EDUCATION/TRAINING PROGRAM | Admitting: FAMILY MEDICINE
Payer: MEDICARE

## 2024-12-14 DIAGNOSIS — K83.8 COMMON BILE DUCT DILATION: ICD-10-CM

## 2024-12-14 DIAGNOSIS — R10.9 INTRACTABLE ABDOMINAL PAIN: Primary | ICD-10-CM

## 2024-12-14 DIAGNOSIS — K80.10 CHRONIC CHOLECYSTITIS WITH CALCULUS: ICD-10-CM

## 2024-12-14 DIAGNOSIS — K81.1 CHRONIC CHOLECYSTITIS: ICD-10-CM

## 2024-12-14 LAB
ALBUMIN SERPL-MCNC: 3.8 G/DL (ref 3.5–5.2)
ALBUMIN/GLOB SERPL: 1.2 G/DL
ALP SERPL-CCNC: 78 U/L (ref 39–117)
ALT SERPL W P-5'-P-CCNC: 31 U/L (ref 1–33)
ANION GAP SERPL CALCULATED.3IONS-SCNC: 13 MMOL/L (ref 5–15)
AST SERPL-CCNC: 23 U/L (ref 1–32)
BACTERIA UR QL AUTO: ABNORMAL /HPF
BASOPHILS # BLD AUTO: 0.03 10*3/MM3 (ref 0–0.2)
BASOPHILS NFR BLD AUTO: 0.3 % (ref 0–1.5)
BILIRUB SERPL-MCNC: 0.5 MG/DL (ref 0–1.2)
BILIRUB UR QL STRIP: NEGATIVE
BUN SERPL-MCNC: 11 MG/DL (ref 8–23)
BUN/CREAT SERPL: 18.3 (ref 7–25)
CALCIUM SPEC-SCNC: 9.2 MG/DL (ref 8.6–10.5)
CHLORIDE SERPL-SCNC: 100 MMOL/L (ref 98–107)
CLARITY UR: CLEAR
CO2 SERPL-SCNC: 23 MMOL/L (ref 22–29)
COLOR UR: YELLOW
CREAT SERPL-MCNC: 0.6 MG/DL (ref 0.57–1)
DEPRECATED RDW RBC AUTO: 40.3 FL (ref 37–54)
EGFRCR SERPLBLD CKD-EPI 2021: 96.7 ML/MIN/1.73
EOSINOPHIL # BLD AUTO: 0.01 10*3/MM3 (ref 0–0.4)
EOSINOPHIL NFR BLD AUTO: 0.1 % (ref 0.3–6.2)
ERYTHROCYTE [DISTWIDTH] IN BLOOD BY AUTOMATED COUNT: 13.5 % (ref 12.3–15.4)
GLOBULIN UR ELPH-MCNC: 3.2 GM/DL
GLUCOSE SERPL-MCNC: 144 MG/DL (ref 65–99)
GLUCOSE UR STRIP-MCNC: NEGATIVE MG/DL
HCT VFR BLD AUTO: 43.7 % (ref 34–46.6)
HGB BLD-MCNC: 14.1 G/DL (ref 12–15.9)
HGB UR QL STRIP.AUTO: NEGATIVE
HOLD SPECIMEN: NORMAL
HYALINE CASTS UR QL AUTO: ABNORMAL /LPF
IMM GRANULOCYTES # BLD AUTO: 0.05 10*3/MM3 (ref 0–0.05)
IMM GRANULOCYTES NFR BLD AUTO: 0.5 % (ref 0–0.5)
KETONES UR QL STRIP: NEGATIVE
LEUKOCYTE ESTERASE UR QL STRIP.AUTO: ABNORMAL
LIPASE SERPL-CCNC: 25 U/L (ref 13–60)
LYMPHOCYTES # BLD AUTO: 1.63 10*3/MM3 (ref 0.7–3.1)
LYMPHOCYTES NFR BLD AUTO: 17.5 % (ref 19.6–45.3)
MCH RBC QN AUTO: 26.7 PG (ref 26.6–33)
MCHC RBC AUTO-ENTMCNC: 32.3 G/DL (ref 31.5–35.7)
MCV RBC AUTO: 82.6 FL (ref 79–97)
MONOCYTES # BLD AUTO: 0.35 10*3/MM3 (ref 0.1–0.9)
MONOCYTES NFR BLD AUTO: 3.8 % (ref 5–12)
NEUTROPHILS NFR BLD AUTO: 7.22 10*3/MM3 (ref 1.7–7)
NEUTROPHILS NFR BLD AUTO: 77.8 % (ref 42.7–76)
NITRITE UR QL STRIP: NEGATIVE
NRBC BLD AUTO-RTO: 0 /100 WBC (ref 0–0.2)
PH UR STRIP.AUTO: 5.5 [PH] (ref 5–8)
PLATELET # BLD AUTO: 237 10*3/MM3 (ref 140–450)
PMV BLD AUTO: 8.9 FL (ref 6–12)
POTASSIUM SERPL-SCNC: 3.8 MMOL/L (ref 3.5–5.2)
PROT SERPL-MCNC: 7 G/DL (ref 6–8.5)
PROT UR QL STRIP: NEGATIVE
RBC # BLD AUTO: 5.29 10*6/MM3 (ref 3.77–5.28)
RBC # UR STRIP: ABNORMAL /HPF
REF LAB TEST METHOD: ABNORMAL
SODIUM SERPL-SCNC: 136 MMOL/L (ref 136–145)
SP GR UR STRIP: 1.02 (ref 1–1.03)
SQUAMOUS #/AREA URNS HPF: ABNORMAL /HPF
TROPONIN T SERPL HS-MCNC: 7 NG/L
UROBILINOGEN UR QL STRIP: ABNORMAL
WBC # UR STRIP: ABNORMAL /HPF
WBC NRBC COR # BLD AUTO: 9.29 10*3/MM3 (ref 3.4–10.8)
WHOLE BLOOD HOLD COAG: NORMAL
WHOLE BLOOD HOLD SPECIMEN: NORMAL

## 2024-12-14 PROCEDURE — 99285 EMERGENCY DEPT VISIT HI MDM: CPT

## 2024-12-14 PROCEDURE — 80307 DRUG TEST PRSMV CHEM ANLYZR: CPT

## 2024-12-14 PROCEDURE — 36415 COLL VENOUS BLD VENIPUNCTURE: CPT

## 2024-12-14 PROCEDURE — 25510000001 IOPAMIDOL 61 % SOLUTION: Performed by: STUDENT IN AN ORGANIZED HEALTH CARE EDUCATION/TRAINING PROGRAM

## 2024-12-14 PROCEDURE — 85025 COMPLETE CBC W/AUTO DIFF WBC: CPT | Performed by: STUDENT IN AN ORGANIZED HEALTH CARE EDUCATION/TRAINING PROGRAM

## 2024-12-14 PROCEDURE — 80053 COMPREHEN METABOLIC PANEL: CPT | Performed by: STUDENT IN AN ORGANIZED HEALTH CARE EDUCATION/TRAINING PROGRAM

## 2024-12-14 PROCEDURE — 84484 ASSAY OF TROPONIN QUANT: CPT | Performed by: STUDENT IN AN ORGANIZED HEALTH CARE EDUCATION/TRAINING PROGRAM

## 2024-12-14 PROCEDURE — 81001 URINALYSIS AUTO W/SCOPE: CPT | Performed by: STUDENT IN AN ORGANIZED HEALTH CARE EDUCATION/TRAINING PROGRAM

## 2024-12-14 PROCEDURE — 25810000003 SODIUM CHLORIDE 0.9 % SOLUTION: Performed by: STUDENT IN AN ORGANIZED HEALTH CARE EDUCATION/TRAINING PROGRAM

## 2024-12-14 PROCEDURE — 96374 THER/PROPH/DIAG INJ IV PUSH: CPT

## 2024-12-14 PROCEDURE — 93010 ELECTROCARDIOGRAM REPORT: CPT | Performed by: INTERNAL MEDICINE

## 2024-12-14 PROCEDURE — 96361 HYDRATE IV INFUSION ADD-ON: CPT

## 2024-12-14 PROCEDURE — 25010000002 MORPHINE PER 10 MG: Performed by: STUDENT IN AN ORGANIZED HEALTH CARE EDUCATION/TRAINING PROGRAM

## 2024-12-14 PROCEDURE — 83690 ASSAY OF LIPASE: CPT | Performed by: STUDENT IN AN ORGANIZED HEALTH CARE EDUCATION/TRAINING PROGRAM

## 2024-12-14 PROCEDURE — 74177 CT ABD & PELVIS W/CONTRAST: CPT

## 2024-12-14 PROCEDURE — 93005 ELECTROCARDIOGRAM TRACING: CPT | Performed by: STUDENT IN AN ORGANIZED HEALTH CARE EDUCATION/TRAINING PROGRAM

## 2024-12-14 PROCEDURE — 96375 TX/PRO/DX INJ NEW DRUG ADDON: CPT

## 2024-12-14 RX ORDER — FAMOTIDINE 10 MG/ML
20 INJECTION, SOLUTION INTRAVENOUS ONCE
Status: COMPLETED | OUTPATIENT
Start: 2024-12-14 | End: 2024-12-14

## 2024-12-14 RX ORDER — IOPAMIDOL 612 MG/ML
100 INJECTION, SOLUTION INTRAVASCULAR
Status: COMPLETED | OUTPATIENT
Start: 2024-12-15 | End: 2024-12-14

## 2024-12-14 RX ADMIN — MORPHINE SULFATE 4 MG: 4 INJECTION, SOLUTION INTRAMUSCULAR; INTRAVENOUS at 23:40

## 2024-12-14 RX ADMIN — FAMOTIDINE 20 MG: 10 INJECTION INTRAVENOUS at 23:42

## 2024-12-14 RX ADMIN — SODIUM CHLORIDE 500 ML: 9 INJECTION, SOLUTION INTRAVENOUS at 23:39

## 2024-12-14 RX ADMIN — IOPAMIDOL 100 ML: 612 INJECTION, SOLUTION INTRAVENOUS at 23:52

## 2024-12-15 ENCOUNTER — APPOINTMENT (OUTPATIENT)
Dept: ULTRASOUND IMAGING | Facility: HOSPITAL | Age: 69
End: 2024-12-15
Payer: MEDICARE

## 2024-12-15 PROBLEM — R52 INTRACTABLE PAIN: Status: ACTIVE | Noted: 2024-12-15

## 2024-12-15 LAB
ALBUMIN SERPL-MCNC: 3.9 G/DL (ref 3.5–5.2)
ALBUMIN/GLOB SERPL: 1.3 G/DL
ALP SERPL-CCNC: 74 U/L (ref 39–117)
ALT SERPL W P-5'-P-CCNC: 28 U/L (ref 1–33)
AMPHET+METHAMPHET UR QL: NEGATIVE
AMPHETAMINES UR QL: NEGATIVE
ANION GAP SERPL CALCULATED.3IONS-SCNC: 12 MMOL/L (ref 5–15)
AST SERPL-CCNC: 19 U/L (ref 1–32)
BARBITURATES UR QL SCN: NEGATIVE
BENZODIAZ UR QL SCN: NEGATIVE
BILIRUB SERPL-MCNC: 0.6 MG/DL (ref 0–1.2)
BUN SERPL-MCNC: 10 MG/DL (ref 8–23)
BUN/CREAT SERPL: 18.2 (ref 7–25)
BUPRENORPHINE SERPL-MCNC: NEGATIVE NG/ML
CALCIUM SPEC-SCNC: 9.2 MG/DL (ref 8.6–10.5)
CANNABINOIDS SERPL QL: NEGATIVE
CHLORIDE SERPL-SCNC: 101 MMOL/L (ref 98–107)
CO2 SERPL-SCNC: 25 MMOL/L (ref 22–29)
COCAINE UR QL: NEGATIVE
CREAT SERPL-MCNC: 0.55 MG/DL (ref 0.57–1)
DEPRECATED RDW RBC AUTO: 41.4 FL (ref 37–54)
EGFRCR SERPLBLD CKD-EPI 2021: 98.7 ML/MIN/1.73
ERYTHROCYTE [DISTWIDTH] IN BLOOD BY AUTOMATED COUNT: 13.6 % (ref 12.3–15.4)
FENTANYL UR-MCNC: NEGATIVE NG/ML
GEN 5 1HR TROPONIN T REFLEX: 7 NG/L
GLOBULIN UR ELPH-MCNC: 3.1 GM/DL
GLUCOSE BLDC GLUCOMTR-MCNC: 101 MG/DL (ref 70–130)
GLUCOSE BLDC GLUCOMTR-MCNC: 132 MG/DL (ref 70–130)
GLUCOSE BLDC GLUCOMTR-MCNC: 144 MG/DL (ref 70–130)
GLUCOSE BLDC GLUCOMTR-MCNC: 155 MG/DL (ref 70–130)
GLUCOSE SERPL-MCNC: 129 MG/DL (ref 65–99)
HBA1C MFR BLD: 6.6 % (ref 4.8–5.6)
HCT VFR BLD AUTO: 41.8 % (ref 34–46.6)
HGB BLD-MCNC: 13.4 G/DL (ref 12–15.9)
MCH RBC QN AUTO: 26.9 PG (ref 26.6–33)
MCHC RBC AUTO-ENTMCNC: 32.1 G/DL (ref 31.5–35.7)
MCV RBC AUTO: 83.9 FL (ref 79–97)
METHADONE UR QL SCN: NEGATIVE
OPIATES UR QL: NEGATIVE
OXYCODONE UR QL SCN: NEGATIVE
PCP UR QL SCN: NEGATIVE
PLATELET # BLD AUTO: 232 10*3/MM3 (ref 140–450)
PMV BLD AUTO: 9 FL (ref 6–12)
POTASSIUM SERPL-SCNC: 3.6 MMOL/L (ref 3.5–5.2)
PROT SERPL-MCNC: 7 G/DL (ref 6–8.5)
RBC # BLD AUTO: 4.98 10*6/MM3 (ref 3.77–5.28)
SODIUM SERPL-SCNC: 138 MMOL/L (ref 136–145)
TRICYCLICS UR QL SCN: NEGATIVE
TROPONIN T NUMERIC DELTA: 0 NG/L
WBC NRBC COR # BLD AUTO: 9.5 10*3/MM3 (ref 3.4–10.8)

## 2024-12-15 PROCEDURE — 96375 TX/PRO/DX INJ NEW DRUG ADDON: CPT

## 2024-12-15 PROCEDURE — G0378 HOSPITAL OBSERVATION PER HR: HCPCS

## 2024-12-15 PROCEDURE — 25010000002 ENOXAPARIN PER 10 MG

## 2024-12-15 PROCEDURE — 96376 TX/PRO/DX INJ SAME DRUG ADON: CPT

## 2024-12-15 PROCEDURE — 25810000003 SODIUM CHLORIDE 0.9 % SOLUTION

## 2024-12-15 PROCEDURE — 99214 OFFICE O/P EST MOD 30 MIN: CPT | Performed by: INTERNAL MEDICINE

## 2024-12-15 PROCEDURE — 25010000002 HYDROMORPHONE PER 4 MG

## 2024-12-15 PROCEDURE — 25810000003 LACTATED RINGERS PER 1000 ML: Performed by: INTERNAL MEDICINE

## 2024-12-15 PROCEDURE — 96361 HYDRATE IV INFUSION ADD-ON: CPT

## 2024-12-15 PROCEDURE — 83036 HEMOGLOBIN GLYCOSYLATED A1C: CPT

## 2024-12-15 PROCEDURE — 76705 ECHO EXAM OF ABDOMEN: CPT

## 2024-12-15 PROCEDURE — 96372 THER/PROPH/DIAG INJ SC/IM: CPT

## 2024-12-15 PROCEDURE — 80053 COMPREHEN METABOLIC PANEL: CPT

## 2024-12-15 PROCEDURE — 85027 COMPLETE CBC AUTOMATED: CPT

## 2024-12-15 PROCEDURE — 84484 ASSAY OF TROPONIN QUANT: CPT | Performed by: STUDENT IN AN ORGANIZED HEALTH CARE EDUCATION/TRAINING PROGRAM

## 2024-12-15 PROCEDURE — 36415 COLL VENOUS BLD VENIPUNCTURE: CPT

## 2024-12-15 PROCEDURE — 82948 REAGENT STRIP/BLOOD GLUCOSE: CPT

## 2024-12-15 PROCEDURE — 25010000002 HYDROMORPHONE PER 4 MG: Performed by: STUDENT IN AN ORGANIZED HEALTH CARE EDUCATION/TRAINING PROGRAM

## 2024-12-15 RX ORDER — ONDANSETRON 2 MG/ML
4 INJECTION INTRAMUSCULAR; INTRAVENOUS EVERY 6 HOURS PRN
Status: DISCONTINUED | OUTPATIENT
Start: 2024-12-15 | End: 2024-12-20 | Stop reason: HOSPADM

## 2024-12-15 RX ORDER — ENOXAPARIN SODIUM 100 MG/ML
40 INJECTION SUBCUTANEOUS DAILY
Status: DISCONTINUED | OUTPATIENT
Start: 2024-12-15 | End: 2024-12-20

## 2024-12-15 RX ORDER — POLYETHYLENE GLYCOL 3350 17 G/17G
0.5 POWDER, FOR SOLUTION ORAL EVERY 12 HOURS
Status: COMPLETED | OUTPATIENT
Start: 2024-12-15 | End: 2024-12-16

## 2024-12-15 RX ORDER — HYDROMORPHONE HYDROCHLORIDE 1 MG/ML
0.5 INJECTION, SOLUTION INTRAMUSCULAR; INTRAVENOUS; SUBCUTANEOUS ONCE
Status: COMPLETED | OUTPATIENT
Start: 2024-12-15 | End: 2024-12-15

## 2024-12-15 RX ORDER — POLYETHYLENE GLYCOL 3350 17 G/17G
17 POWDER, FOR SOLUTION ORAL ONCE
Status: COMPLETED | OUTPATIENT
Start: 2024-12-15 | End: 2024-12-15

## 2024-12-15 RX ORDER — SODIUM CHLORIDE 9 MG/ML
100 INJECTION, SOLUTION INTRAVENOUS CONTINUOUS
Status: DISPENSED | OUTPATIENT
Start: 2024-12-15 | End: 2024-12-15

## 2024-12-15 RX ORDER — TRAMADOL HYDROCHLORIDE 50 MG/1
50 TABLET ORAL EVERY 6 HOURS PRN
Status: ACTIVE | OUTPATIENT
Start: 2024-12-15 | End: 2024-12-16

## 2024-12-15 RX ORDER — DEXTROSE MONOHYDRATE 25 G/50ML
25 INJECTION, SOLUTION INTRAVENOUS
Status: DISCONTINUED | OUTPATIENT
Start: 2024-12-15 | End: 2024-12-20 | Stop reason: HOSPADM

## 2024-12-15 RX ORDER — SODIUM CHLORIDE 0.9 % (FLUSH) 0.9 %
10 SYRINGE (ML) INJECTION AS NEEDED
Status: DISCONTINUED | OUTPATIENT
Start: 2024-12-15 | End: 2024-12-20 | Stop reason: HOSPADM

## 2024-12-15 RX ORDER — HYDROMORPHONE HYDROCHLORIDE 1 MG/ML
0.5 INJECTION, SOLUTION INTRAMUSCULAR; INTRAVENOUS; SUBCUTANEOUS EVERY 4 HOURS PRN
Status: DISPENSED | OUTPATIENT
Start: 2024-12-15 | End: 2024-12-16

## 2024-12-15 RX ORDER — METOPROLOL SUCCINATE 50 MG/1
100 TABLET, EXTENDED RELEASE ORAL DAILY
Status: DISCONTINUED | OUTPATIENT
Start: 2024-12-15 | End: 2024-12-16

## 2024-12-15 RX ORDER — ONDANSETRON 4 MG/1
4 TABLET, ORALLY DISINTEGRATING ORAL EVERY 6 HOURS PRN
Status: DISCONTINUED | OUTPATIENT
Start: 2024-12-15 | End: 2024-12-20 | Stop reason: HOSPADM

## 2024-12-15 RX ORDER — INSULIN LISPRO 100 [IU]/ML
2-7 INJECTION, SOLUTION INTRAVENOUS; SUBCUTANEOUS
Status: DISCONTINUED | OUTPATIENT
Start: 2024-12-15 | End: 2024-12-20 | Stop reason: HOSPADM

## 2024-12-15 RX ORDER — SODIUM CHLORIDE 0.9 % (FLUSH) 0.9 %
10 SYRINGE (ML) INJECTION EVERY 12 HOURS SCHEDULED
Status: DISCONTINUED | OUTPATIENT
Start: 2024-12-15 | End: 2024-12-20 | Stop reason: HOSPADM

## 2024-12-15 RX ORDER — PANTOPRAZOLE SODIUM 40 MG/1
40 TABLET, DELAYED RELEASE ORAL DAILY
Status: DISCONTINUED | OUTPATIENT
Start: 2024-12-15 | End: 2024-12-20 | Stop reason: HOSPADM

## 2024-12-15 RX ORDER — NALOXONE HCL 0.4 MG/ML
0.4 VIAL (ML) INJECTION
Status: DISCONTINUED | OUTPATIENT
Start: 2024-12-15 | End: 2024-12-20 | Stop reason: HOSPADM

## 2024-12-15 RX ORDER — NICOTINE POLACRILEX 4 MG
15 LOZENGE BUCCAL
Status: DISCONTINUED | OUTPATIENT
Start: 2024-12-15 | End: 2024-12-20 | Stop reason: HOSPADM

## 2024-12-15 RX ORDER — SODIUM CHLORIDE, SODIUM LACTATE, POTASSIUM CHLORIDE, CALCIUM CHLORIDE 600; 310; 30; 20 MG/100ML; MG/100ML; MG/100ML; MG/100ML
30 INJECTION, SOLUTION INTRAVENOUS CONTINUOUS
Status: DISCONTINUED | OUTPATIENT
Start: 2024-12-15 | End: 2024-12-17

## 2024-12-15 RX ORDER — SODIUM CHLORIDE 9 MG/ML
40 INJECTION, SOLUTION INTRAVENOUS AS NEEDED
Status: DISCONTINUED | OUTPATIENT
Start: 2024-12-15 | End: 2024-12-20 | Stop reason: HOSPADM

## 2024-12-15 RX ADMIN — PANTOPRAZOLE SODIUM 40 MG: 40 TABLET, DELAYED RELEASE ORAL at 09:36

## 2024-12-15 RX ADMIN — POLYETHYLENE GLYCOL 3350 0.5 BOTTLE: 17 POWDER, FOR SOLUTION ORAL at 17:12

## 2024-12-15 RX ADMIN — HYDROMORPHONE HYDROCHLORIDE 0.5 MG: 1 INJECTION, SOLUTION INTRAMUSCULAR; INTRAVENOUS; SUBCUTANEOUS at 20:04

## 2024-12-15 RX ADMIN — SODIUM CHLORIDE 100 ML/HR: 9 INJECTION, SOLUTION INTRAVENOUS at 09:05

## 2024-12-15 RX ADMIN — HYDROMORPHONE HYDROCHLORIDE 0.5 MG: 1 INJECTION, SOLUTION INTRAMUSCULAR; INTRAVENOUS; SUBCUTANEOUS at 09:05

## 2024-12-15 RX ADMIN — HYDROMORPHONE HYDROCHLORIDE 0.5 MG: 1 INJECTION, SOLUTION INTRAMUSCULAR; INTRAVENOUS; SUBCUTANEOUS at 01:50

## 2024-12-15 RX ADMIN — HYDROMORPHONE HYDROCHLORIDE 0.5 MG: 1 INJECTION, SOLUTION INTRAMUSCULAR; INTRAVENOUS; SUBCUTANEOUS at 13:57

## 2024-12-15 RX ADMIN — SODIUM CHLORIDE, POTASSIUM CHLORIDE, SODIUM LACTATE AND CALCIUM CHLORIDE 30 ML/HR: 600; 310; 30; 20 INJECTION, SOLUTION INTRAVENOUS at 22:49

## 2024-12-15 RX ADMIN — METOPROLOL SUCCINATE 100 MG: 100 TABLET, EXTENDED RELEASE ORAL at 09:36

## 2024-12-15 RX ADMIN — ENOXAPARIN SODIUM 40 MG: 100 INJECTION SUBCUTANEOUS at 09:36

## 2024-12-15 RX ADMIN — Medication 10 ML: at 20:04

## 2024-12-15 RX ADMIN — Medication 10 ML: at 09:05

## 2024-12-15 RX ADMIN — POLYETHYLENE GLYCOL 3350 17 G: 17 POWDER, FOR SOLUTION ORAL at 17:12

## 2024-12-15 NOTE — H&P
[facility] Medicine Services  HISTORY AND PHYSICAL    Date of Admission: 12/14/2024  Primary Care Physician: Jennifer Hummel,     Subjective   Primary Historian: Patient    Chief Complaint: Abdominal pain      HPI:  This is a 69-year-old female patient with a medical history of diabetes mellitus type 2 and hypertension, presenting to the ED with intractable epigastric pain.  She has been having this abdominal pain for the last 3 days, and was associated with nausea and nonbloody vomiting.  She feels bloated, but is having normal bowel movements.  She denies any fever or chills.  She denies any chest pain shortness of breath.        Review of Systems   Otherwise complete ROS reviewed and negative except as mentioned in the HPI.    Past Medical History:   Past Medical History:   Diagnosis Date    Anxiety     Arthritis     Diverticulosis     Eczema     GERD (gastroesophageal reflux disease)     History of DVT (deep vein thrombosis)     left leg, about 6 yrs    Hyperlipidemia     Hypertension     Insomnia     Right leg numbness     Sleep apnea     cpap    Stress incontinence     TIA (transient ischemic attack)      Past Surgical History:  Past Surgical History:   Procedure Laterality Date    BREAST LUMPECTOMY Left     CATARACT EXTRACTION WITH INTRAOCULAR LENS IMPLANT Bilateral     COLONOSCOPY  12/15/2011    Diverticulosis; Repeat 7 years    COLONOSCOPY N/A 2/1/2019    Diverticulosis in the left colon; The examination was otherwise normal on direct and retroflexion views; No specimens collected; Repeat 10 years    COLONOSCOPY N/A 5/6/2021    The examined portion of the ileum was normal; Diverticulosis in the left colon; The examination was otherwise normal on direct and retroflexion views; Biopsies were taken with a cold forceps from the entire colon for evaluation of microscopic colitis; Repeat 10 years    ENDOSCOPY  12/15/2011        ENDOSCOPY N/A 11/18/2019    Normal  esophagus; Normal stomach; Normal examined duodenum-biopsied    HYSTERECTOMY      STEROID INJECTION Bilateral 8/21/2018    Procedure: 2, 3 TARSOMETATARSAL JOINT INJECTION WITH FLUROSCOPIC GUIDANCE - BILATERAL FEET;  Surgeon: Senthil Curtis DPM;  Location:  [facility] OR;  Service: Podiatry    STEROID INJECTION Bilateral 1/15/2019    Procedure: 2, 3, TARSOMETATARSAL JOINT INJECTION WITH FLUROSCOPIC GUIDANCE- BILATERAL FEET;  Surgeon: Senthil Cutris DPM;  Location:  [facility] OR;  Service: Podiatry     Social History:  reports that she has never smoked. She has never used smokeless tobacco. She reports current alcohol use. She reports that she does not use drugs.    Family History: family history includes Coronary artery disease in her mother; No Known Problems in her father, sister, sister, sister, and sister.       Allergies:  Allergies   Allergen Reactions    Nyquil Multi-Symptom [Pseudoeph-Doxylamine-Dm-Apap] Rash       Medications:  Prior to Admission medications    Medication Sig Start Date End Date Taking? Authorizing Provider   celecoxib (CeleBREX) 200 MG capsule Take 1 capsule by mouth 2 (Two) Times a Day. 11/3/23   Monique Gale MD   Cholecalciferol 25 MCG (1000 UT) tablet Take 1 tablet by mouth Daily.    Monique Gale MD   citalopram (CeleXA) 20 MG tablet Take 1 tablet by mouth Daily. 11/7/24   Monique Gale MD   clotrimazole-betamethasone (LOTRISONE) 1-0.05 % cream Apply  topically 2 (Two) Times a Day.  Patient taking differently: Apply  topically to the appropriate area as directed 2 (Two) Times a Day. As needed 11/22/16   Mer Berry APRN   dicyclomine (BENTYL) 10 MG capsule Take 1 capsule by mouth 4 (Four) Times a Day Before Meals & at Bedtime. 11/21/24   Lina Allen APRN   ezetimibe (ZETIA) 10 MG tablet Take 1 tablet by mouth Daily. 11/3/23   Monique Gale MD   fluticasone (FLONASE) 50 MCG/ACT nasal spray Administer 2 sprays into the nostril(s) as  "directed by provider As Needed. 11/3/23   Monique Gale MD   hydrochlorothiazide (MICROZIDE) 12.5 MG capsule Take 12.5 mg by mouth every morning  10/26/16   Monique Gale MD   irbesartan (AVAPRO) 150 MG tablet Take 150 mg by mouth daily  10/26/16   Monique Gale MD   loperamide (Imodium A-D) 2 MG tablet Take 1 tablet by mouth 4 (Four) Times a Day As Needed for Diarrhea.  Patient taking differently: Take 1 tablet by mouth As Needed. 7/12/21   Jaqui Goodman APRN   metFORMIN ER (GLUCOPHAGE-XR) 500 MG 24 hr tablet Take 1 tablet by mouth Daily. 10/22/24   Monique Gale MD   metoprolol succinate XL (TOPROL-XL) 100 MG 24 hr tablet Take 100 mg by mouth daily  10/26/16   Monique Gale MD   pantoprazole (PROTONIX) 40 MG EC tablet Take 1 tablet by mouth Daily. 11/21/24   Lina Allen APRN     I have utilized all available immediate resources to obtain, update, or review the patient's current medications (including all prescriptions, over-the-counter products, herbals, cannabis/cannabidiol products, and vitamin/mineral/dietary (nutritional) supplements).    Objective     Vital Signs: /73 (BP Location: Right arm, Patient Position: Sitting)   Pulse 67   Temp 97.3 °F (36.3 °C) (Oral)   Resp 15   Ht 160 cm (63\")   Wt 98.4 kg (217 lb)   SpO2 95%   BMI 38.44 kg/m²   Physical Exam  Constitutional:       Appearance: Normal appearance.   Cardiovascular:      Rate and Rhythm: Normal rate and regular rhythm.      Pulses: Normal pulses.      Heart sounds: Normal heart sounds. No murmur heard.  Pulmonary:      Effort: Pulmonary effort is normal. No respiratory distress.      Breath sounds: Normal breath sounds. No wheezing or rales.   Abdominal:      General: Bowel sounds are normal. There is no distension.      Palpations: Abdomen is soft.      Tenderness: There is abdominal tenderness (Mild epigastric tenderness). There is no guarding.   Musculoskeletal:      Right lower leg: No " edema.      Left lower leg: No edema.   Skin:     General: Skin is warm.   Neurological:      General: No focal deficit present.      Mental Status: She is alert and oriented to person, place, and time.              Results Reviewed:  Lab Results (last 24 hours)       Procedure Component Value Units Date/Time    High Sensitivity Troponin T 1Hr [[id number]]  (Normal) Collected: 12/15/24 0056    Specimen: Blood Updated: 12/15/24 0120     HS Troponin T 7 ng/L      Troponin T Delta 0 ng/L     Narrative:      High Sensitive Troponin T Reference Range:  <14.0 ng/L- Negative Female for AMI  <22.0 ng/L- Negative Male for AMI  >=14 - Abnormal Female indicating possible myocardial injury.  >=22 - Abnormal Male indicating possible myocardial injury.   Clinicians would have to utilize clinical acumen, EKG, Troponin, and serial changes to determine if it is an Acute Myocardial Infarction or myocardial injury due to an underlying chronic condition.         Urinalysis With Culture If Indicated - Urine, Clean Catch [[id number]]  (Abnormal) Collected: 12/14/24 2343    Specimen: Urine, Clean Catch Updated: 12/14/24 2357     Color, UA Yellow     Appearance, UA Clear     pH, UA 5.5     Specific Gravity, UA 1.023     Glucose, UA Negative     Ketones, UA Negative     Bilirubin, UA Negative     Blood, UA Negative     Protein, UA Negative     Leuk Esterase, UA Trace     Nitrite, UA Negative     Urobilinogen, UA 1.0 E.U./dL    Narrative:      In absence of clinical symptoms, the presence of pyuria, bacteria, and/or nitrites on the urinalysis result does not correlate with infection.    Urinalysis, Microscopic Only - Urine, Clean Catch [[id number]]  (Abnormal) Collected: 12/14/24 2343    Specimen: Urine, Clean Catch Updated: 12/14/24 2357     RBC, UA 0-2 /HPF      WBC, UA 3-5 /HPF      Comment: Urine culture not indicated.        Bacteria, UA None Seen /HPF      Squamous Epithelial Cells, UA 0-2 /HPF      Hyaline Casts, UA 0-2 /LPF       Methodology Automated Microscopy    Comprehensive Metabolic Panel [[id number]]  (Abnormal) Collected: 12/14/24 2253    Specimen: Blood Updated: 12/14/24 2336     Glucose 144 mg/dL      BUN 11 mg/dL      Creatinine 0.60 mg/dL      Sodium 136 mmol/L      Potassium 3.8 mmol/L      Chloride 100 mmol/L      CO2 23.0 mmol/L      Calcium 9.2 mg/dL      Total Protein 7.0 g/dL      Albumin 3.8 g/dL      ALT (SGPT) 31 U/L      AST (SGOT) 23 U/L      Alkaline Phosphatase 78 U/L      Total Bilirubin 0.5 mg/dL      Globulin 3.2 gm/dL      A/G Ratio 1.2 g/dL      BUN/Creatinine Ratio 18.3     Anion Gap 13.0 mmol/L      eGFR 96.7 mL/min/1.73     Narrative:      GFR Categories in Chronic Kidney Disease (CKD)      GFR Category          GFR (mL/min/1.73)    Interpretation  G1                     90 or greater         Normal or high (1)  G2                      60-89                Mild decrease (1)  G3a                   45-59                Mild to moderate decrease  G3b                   30-44                Moderate to severe decrease  G4                    15-29                Severe decrease  G5                    14 or less           Kidney failure          (1)In the absence of evidence of kidney disease, neither GFR category G1 or G2 fulfill the criteria for CKD.    eGFR calculation 2021 CKD-EPI creatinine equation, which does not include race as a factor    High Sensitivity Troponin T [[id number]]  (Normal) Collected: 12/14/24 2253    Specimen: Blood Updated: 12/14/24 2334     HS Troponin T 7 ng/L     Narrative:      High Sensitive Troponin T Reference Range:  <14.0 ng/L- Negative Female for AMI  <22.0 ng/L- Negative Male for AMI  >=14 - Abnormal Female indicating possible myocardial injury.  >=22 - Abnormal Male indicating possible myocardial injury.   Clinicians would have to utilize clinical acumen, EKG, Troponin, and serial changes to determine if it is an Acute Myocardial Infarction or myocardial injury due to an  underlying chronic condition.         Lipase [[id number]]  (Normal) Collected: 12/14/24 2253    Specimen: Blood Updated: 12/14/24 2332     Lipase 25 U/L     CBC & Differential [[id number]]  (Abnormal) Collected: 12/14/24 2253    Specimen: Blood Updated: 12/14/24 2325    Narrative:      The following orders were created for panel order CBC & Differential.  Procedure                               Abnormality         Status                     ---------                               -----------         ------                     CBC Auto Differential[[id number]]        Abnormal            Final result                 Please view results for these tests on the individual orders.    CBC Auto Differential [[id number]]  (Abnormal) Collected: 12/14/24 2253    Specimen: Blood Updated: 12/14/24 2325     WBC 9.29 10*3/mm3      RBC 5.29 10*6/mm3      Hemoglobin 14.1 g/dL      Hematocrit 43.7 %      MCV 82.6 fL      MCH 26.7 pg      MCHC 32.3 g/dL      RDW 13.5 %      RDW-SD 40.3 fl      MPV 8.9 fL      Platelets 237 10*3/mm3      Neutrophil % 77.8 %      Lymphocyte % 17.5 %      Monocyte % 3.8 %      Eosinophil % 0.1 %      Basophil % 0.3 %      Immature Grans % 0.5 %      Neutrophils, Absolute 7.22 10*3/mm3      Lymphocytes, Absolute 1.63 10*3/mm3      Monocytes, Absolute 0.35 10*3/mm3      Eosinophils, Absolute 0.01 10*3/mm3      Basophils, Absolute 0.03 10*3/mm3      Immature Grans, Absolute 0.05 10*3/mm3      nRBC 0.0 /100 WBC     [facility] Draw [[id number]] Collected: 12/14/24 2253    Specimen: Blood Updated: 12/14/24 2301    Narrative:      The following orders were created for panel order [facility] Draw.  Procedure                               Abnormality         Status                     ---------                               -----------         ------                     Green Top (Gel)[[id number]]                                  Final result               Lavender Top[[id number]]                                     Final  result               Red Top[[id number]]                                          Final result               Jackson Top[[id number]]                                         Final result               Light Blue Top[[id number]]                                   Final result                 Please view results for these tests on the individual orders.    Red Top [[id number]] Collected: 12/14/24 2253    Specimen: Blood Updated: 12/14/24 2301     Extra Tube Hold for add-ons.     Comment: Auto resulted.       Light Blue Top [[id number]] Collected: 12/14/24 2253    Specimen: Blood Updated: 12/14/24 2301     Extra Tube Hold for add-ons.     Comment: Auto resulted       Green Top (Gel) [[id number]] Collected: 12/14/24 2253    Specimen: Blood Updated: 12/14/24 2301     Extra Tube Hold for add-ons.     Comment: Auto resulted.       Lavender Top [[id number]] Collected: 12/14/24 2253    Specimen: Blood Updated: 12/14/24 2301     Extra Tube hold for add-on     Comment: Auto resulted       Jackson Top [[id number]] Collected: 12/14/24 2253    Specimen: Blood Updated: 12/14/24 2301     Extra Tube Hold for add-ons.     Comment: Auto resulted.             Imaging Results (Last 24 Hours)       Procedure Component Value Units Date/Time    US Gallbladder [[id number]] Resulted: 12/15/24 0118     Updated: 12/15/24 0141    CT Abdomen Pelvis With Contrast [[id number]] Resulted: 12/14/24 2341     Updated: 12/14/24 2353          I have personally reviewed and interpreted the radiology studies and ECG obtained at time of admission.     Assessment / Plan   Assessment:   Active Hospital Problems    Diagnosis     **Intractable pain        Assessment and plan:    #Intractable abdominal pain.    -Admitting to regular floor.  -Pain medication provided.  -antiEmetic medication provided.  -DVT prophylaxis with Lovenox subcu.  -CT abdomen was done initially hinting to common bile duct dilatation, however, ultrasound did not show any signs of cholecystitis or biliary  issue.  -In case patient continues to have pain, I believe she might benefit from a HIDA scan or may be a GI consult.  Day team to follow.  -Fingerstick and sliding scale for now.  -Will add A1c given history of diabetes.  -Will add drug screen.    Med rec is not complete.  Day team to follow.    CODE STATUS discussed with the patient and she wants to be full code.    Electronically signed by Eun Fuentes MD, 12/15/24, 03:35 CST.

## 2024-12-15 NOTE — ED PROVIDER NOTES
Subjective   History of Present Illness  This is a 69 y.o F with hx of HTN, diabetes, no abd surgery, here for 3 days of R sided abd pain, not improving with bentyl and immodium. Last BM yesterday, passing out. She feels bloated. No urine symptoms or fever. No nausea, vomiting. Negative for back pain. No chest pain or shortness of breath.         Review of Systems   Gastrointestinal:  Positive for abdominal pain.   All other systems reviewed and are negative.      Past Medical History:   Diagnosis Date    Anxiety     Arthritis     Diverticulosis     Eczema     GERD (gastroesophageal reflux disease)     History of DVT (deep vein thrombosis)     left leg, about 6 yrs    Hyperlipidemia     Hypertension     Insomnia     Right leg numbness     Sleep apnea     cpap    Stress incontinence     TIA (transient ischemic attack)        Allergies   Allergen Reactions    Nyquil Multi-Symptom [Pseudoeph-Doxylamine-Dm-Apap] Rash       Past Surgical History:   Procedure Laterality Date    BREAST LUMPECTOMY Left     CATARACT EXTRACTION WITH INTRAOCULAR LENS IMPLANT Bilateral     COLONOSCOPY  12/15/2011    Diverticulosis; Repeat 7 years    COLONOSCOPY N/A 2/1/2019    Diverticulosis in the left colon; The examination was otherwise normal on direct and retroflexion views; No specimens collected; Repeat 10 years    COLONOSCOPY N/A 5/6/2021    The examined portion of the ileum was normal; Diverticulosis in the left colon; The examination was otherwise normal on direct and retroflexion views; Biopsies were taken with a cold forceps from the entire colon for evaluation of microscopic colitis; Repeat 10 years    ENDOSCOPY  12/15/2011        ENDOSCOPY N/A 11/18/2019    Normal esophagus; Normal stomach; Normal examined duodenum-biopsied    HYSTERECTOMY      STEROID INJECTION Bilateral 8/21/2018    Procedure: 2, 3 TARSOMETATARSAL JOINT INJECTION WITH FLUROSCOPIC GUIDANCE - BILATERAL FEET;  Surgeon: Senthil Curtis DPM;  Location: [facility]  OR;  Service: Podiatry    STEROID INJECTION Bilateral 1/15/2019    Procedure: 2, 3, TARSOMETATARSAL JOINT INJECTION WITH FLUROSCOPIC GUIDANCE- BILATERAL FEET;  Surgeon: Senthil Curtis DPM;  Location:  [facility] OR;  Service: Podiatry       Family History   Problem Relation Age of Onset    No Known Problems Father     Coronary artery disease Mother     No Known Problems Sister     No Known Problems Sister     No Known Problems Sister     No Known Problems Sister     Colon cancer Neg Hx     Colon polyps Neg Hx     Esophageal cancer Neg Hx     Liver cancer Neg Hx     Liver disease Neg Hx     Rectal cancer Neg Hx     Stomach cancer Neg Hx        Social History     Socioeconomic History    Marital status:    Tobacco Use    Smoking status: Never    Smokeless tobacco: Never   Vaping Use    Vaping status: Never Used   Substance and Sexual Activity    Alcohol use: Yes     Comment: Occasionally    Drug use: No    Sexual activity: Defer           Objective   Physical Exam  Constitutional:       General: She is not in acute distress.     Appearance: Normal appearance.   HENT:      Head: Normocephalic.      Nose: No congestion.   Eyes:      Extraocular Movements: Extraocular movements intact.      Pupils: Pupils are equal, round, and reactive to light.   Cardiovascular:      Rate and Rhythm: Normal rate and regular rhythm.      Pulses: Normal pulses.   Pulmonary:      Effort: Pulmonary effort is normal. No respiratory distress.      Breath sounds: Normal breath sounds. No wheezing or rales.   Abdominal:      General: There is no distension.      Palpations: Abdomen is soft.      Tenderness: There is abdominal tenderness. There is no right CVA tenderness, left CVA tenderness, guarding or rebound.      Comments: RUQ tenderness, as well as epigastric. R sided mid abd.    Musculoskeletal:         General: Normal range of motion.      Cervical back: Normal range of motion. No rigidity or tenderness.   Lymphadenopathy:       Cervical: No cervical adenopathy.   Skin:     General: Skin is warm.      Capillary Refill: Capillary refill takes less than 2 seconds.   Neurological:      General: No focal deficit present.      Mental Status: She is alert and oriented to person, place, and time. Mental status is at baseline.      Cranial Nerves: No cranial nerve deficit.         Procedures           ED Course                                                       Medical Decision Making  This is a 69 y.o F hx of HTN, diabetes, abd pain in the past, here for R sided abd pain x 3 days not improving with home medication. Afebrile. No urinary symptoms. No BM changes. No abd distention. Abd is soft, no guarding or rebound, however RUQ and epigastric tenderness. Will give pepcid and morphine for pain, as well as iv fluids as IV contrast CT abd pelvis was ordered. Pending cbc, cmp, lipase, cardiac work up with EKG and one troponin. PT and daughter in agreement.     CBC negative for leukocytosis, chemistry reassuring. CT initial was concerning for acute cholecystitis with common bile duct dilation. However US negative for cholecystitis. And CBD measures 6mm which  is normal for this age. PT felt better after dilaudid however still has pain. Intractable abd pain is indicated. She will likely benefit from hida scan.     Spoke to Dr. Fuentes who is agreeable with admit for observation of intractable abd pain.     Amount and/or Complexity of Data Reviewed  Labs: ordered.  Radiology: ordered.  ECG/medicine tests: ordered.    Risk  Prescription drug management.        Final diagnoses:   Intractable abdominal pain       ED Disposition  ED Disposition       ED Disposition   Decision to Admit    Condition   --    Comment   Level of Care: Med/Surg [1]   Diagnosis: Intractable pain [040352]   Admitting Physician: TODD FUENTES [[id number]]   Attending Physician: TODD FUENTES [[id number]]                 No follow-up provider specified.       Medication List      No  changes were made to your prescriptions during this visit.            Serafin Varela MD  12/15/24 3077

## 2024-12-15 NOTE — CONSULTS
"                                Inpatient Gastroenterology Consult  Referring Provider: Funmilayo Bains MD  Gastroenterologist of Record: ---  Reason for Consultation: \"Intractable abdominal pain\"    Subjective     History of present illness: Patient has had problems with abdominal pain off and on I think for a few months.  However, she has had particularly severe pain over the last 3 days.  It seems to be a bit migratory.  It sounds like it started originally in the epigastrium, then migrated down to the right and left lower quadrants.  It feels like a colicky or gassy pain.  She gets a moment of relief if she belches or passes flatus.  He is not able to tolerate any solid food.  She had a colonoscopy a couple of years ago that I believe was unremarkable.  At the time of admission she had a CT of the abdomen which raised the question of biliary tract disease, with the possibility of a calculus in the gallbladder neck and a common bile duct measuring 9 mm.  A follow-up right upper quadrant ultrasound was obtained, demonstrating a common duct measuring 6 mm, and unremarkable appearing gallbladder and no evidence of stone within the gallbladder neck.    Past Medical History:  Past Medical History:   Diagnosis Date    Anxiety     Arthritis     Diverticulosis     Eczema     GERD (gastroesophageal reflux disease)     History of DVT (deep vein thrombosis)     left leg, about 6 yrs    Hyperlipidemia     Hypertension     Insomnia     Right leg numbness     Sleep apnea     cpap    Stress incontinence     TIA (transient ischemic attack)        Past Surgical History:  Past Surgical History:   Procedure Laterality Date    BREAST LUMPECTOMY Left     CATARACT EXTRACTION WITH INTRAOCULAR LENS IMPLANT Bilateral     COLONOSCOPY  12/15/2011    Diverticulosis; Repeat 7 years    COLONOSCOPY N/A 2/1/2019    Diverticulosis in the left colon; The examination was otherwise normal on direct and retroflexion views; No specimens " collected; Repeat 10 years    COLONOSCOPY N/A 5/6/2021    The examined portion of the ileum was normal; Diverticulosis in the left colon; The examination was otherwise normal on direct and retroflexion views; Biopsies were taken with a cold forceps from the entire colon for evaluation of microscopic colitis; Repeat 10 years    ENDOSCOPY  12/15/2011        ENDOSCOPY N/A 11/18/2019    Normal esophagus; Normal stomach; Normal examined duodenum-biopsied    HYSTERECTOMY      STEROID INJECTION Bilateral 8/21/2018    Procedure: 2, 3 TARSOMETATARSAL JOINT INJECTION WITH FLUROSCOPIC GUIDANCE - BILATERAL FEET;  Surgeon: Senthil Curtis DPM;  Location:  [facility];  Service: Podiatry    STEROID INJECTION Bilateral 1/15/2019    Procedure: 2, 3, TARSOMETATARSAL JOINT INJECTION WITH FLUROSCOPIC GUIDANCE- BILATERAL FEET;  Surgeon: Senthil Curtis DPM;  Location:  [facility];  Service: Podiatry        Social History:   Social History     Tobacco Use    Smoking status: Never    Smokeless tobacco: Never   Substance Use Topics    Alcohol use: Yes     Comment: Occasionally        Family History:  Family History   Problem Relation Age of Onset    No Known Problems Father     Coronary artery disease Mother     No Known Problems Sister     No Known Problems Sister     No Known Problems Sister     No Known Problems Sister     Colon cancer Neg Hx     Colon polyps Neg Hx     Esophageal cancer Neg Hx     Liver cancer Neg Hx     Liver disease Neg Hx     Rectal cancer Neg Hx     Stomach cancer Neg Hx        Home Meds:  Medications Prior to Admission   Medication Sig Dispense Refill Last Dose/Taking    celecoxib (CeleBREX) 200 MG capsule Take 1 capsule by mouth 2 (Two) Times a Day.   Past Week    Cholecalciferol 25 MCG (1000 UT) tablet Take 1 tablet by mouth Daily.   Past Week    citalopram (CeleXA) 20 MG tablet Take 1 tablet by mouth Daily.   Past Week    dicyclomine (BENTYL) 10 MG capsule Take 1 capsule by mouth 4 (Four) Times a Day Before  Meals & at Bedtime. 360 capsule 3 Past Week    ezetimibe (ZETIA) 10 MG tablet Take 1 tablet by mouth Daily.   Past Week    hydrochlorothiazide (MICROZIDE) 12.5 MG capsule Take 1 capsule by mouth Daily.   Past Week    irbesartan (AVAPRO) 300 MG tablet Take 1 tablet by mouth Daily.   Past Week    loperamide (Imodium A-D) 2 MG tablet Take 1 tablet by mouth 4 (Four) Times a Day As Needed for Diarrhea. 120 tablet 12 Past Week    metoprolol succinate XL (TOPROL-XL) 100 MG 24 hr tablet Take 2 tablets by mouth Daily.   Past Week    pantoprazole (PROTONIX) 40 MG EC tablet Take 1 tablet by mouth Daily. 90 tablet 3 Past Week    clotrimazole-betamethasone (LOTRISONE) 1-0.05 % cream Apply  topically 2 (Two) Times a Day. (Patient not taking: Reported on 12/15/2024) 15 g 0 Not Taking    fluticasone (FLONASE) 50 MCG/ACT nasal spray Administer 2 sprays into the nostril(s) as directed by provider As Needed.   More than a month       Current Meds:   enoxaparin, 40 mg, Subcutaneous, Daily  insulin lispro, 2-7 Units, Subcutaneous, 4x Daily AC & at Bedtime  metoprolol succinate XL, 100 mg, Oral, Daily  pantoprazole, 40 mg, Oral, Daily  sodium chloride, 10 mL, Intravenous, Q12H        Allergies:  Allergies   Allergen Reactions    Nyquil Multi-Symptom [Pseudoeph-Doxylamine-Dm-Apap] Rash       Review of Systems  Pertinent items are noted in HPI, all other systems reviewed and negative    Objective     Vital Signs  Temp:  [97.3 °F (36.3 °C)-98.3 °F (36.8 °C)] 98.3 °F (36.8 °C)  Heart Rate:  [55-71] 60  Resp:  [15-20] 16  BP: (125-162)/() 131/62    Physical Exam:     General Appearance:  Alert, cooperative, in no acute distress   Head:  Normocephalic, without obvious abnormality, atraumatic   Eyes:  Lids and lashes normal, conjunctivae and sclerae normal, no icterus, no pallor, corneas clear, PERRLA   Ears:  Ears appear intact with no abnormalities noted   Throat:  No oral lesions, no thrush, oral mucosa moist   Neck:  No adenopathy,  "supple, trachea midline, no thyromegaly, no carotid bruit, no JVD   Back:  No kyphosis present, no scoliosis present, no skin lesions, erythema or scars, no tenderness to percussion, or palpation, range of motion normal   Lungs:  Clear to auscultation,respirations regular, even and unlabored    Heart:  Regular rhythm and normal rate, normal S1 and S2, no murmur, no gallop, no rub, no click   Breast Exam:  Deferred   Abdomen:  Normal bowel sounds, abdomen has mild to moderate tenderness without peritoneal signs and no definite mass.   Genitalia:  Deferred   Extremities:  Moves all extremities well, no edema, no cyanosis, no redness   Pulses:  Pulses palpable and equal bilaterally   Skin:  No bleeding, bruising or rash   Lymph nodes:  No palpable adenopathy   Neurologic:  Cranial nerves 2 - 12 grossly intact, sensation intact, DTR present and equal bilaterally       WBC No results found for: \"WBCS\"   HGB Hemoglobin   Date Value Ref Range Status   12/15/2024 13.4 12.0 - 15.9 g/dL Final   12/14/2024 14.1 12.0 - 15.9 g/dL Final      HCT Hematocrit   Date Value Ref Range Status   12/15/2024 41.8 34.0 - 46.6 % Final   12/14/2024 43.7 34.0 - 46.6 % Final      Platelets No results found for: \"LABPLAT\"   MCV MCV   Date Value Ref Range Status   12/15/2024 83.9 79.0 - 97.0 fL Final   12/14/2024 82.6 79.0 - 97.0 fL Final          Sodium Sodium   Date Value Ref Range Status   12/15/2024 138 136 - 145 mmol/L Final   12/14/2024 136 136 - 145 mmol/L Final      Potassium Potassium   Date Value Ref Range Status   12/15/2024 3.6 3.5 - 5.2 mmol/L Final   12/14/2024 3.8 3.5 - 5.2 mmol/L Final      Chloride Chloride   Date Value Ref Range Status   12/15/2024 101 98 - 107 mmol/L Final   12/14/2024 100 98 - 107 mmol/L Final      CO2 CO2   Date Value Ref Range Status   12/15/2024 25.0 22.0 - 29.0 mmol/L Final   12/14/2024 23.0 22.0 - 29.0 mmol/L Final      BUN BUN   Date Value Ref Range Status   12/15/2024 10 8 - 23 mg/dL Final   12/14/2024 " "11 8 - 23 mg/dL Final      Creatinine Creatinine   Date Value Ref Range Status   12/15/2024 0.55 (L) 0.57 - 1.00 mg/dL Final   12/14/2024 0.60 0.57 - 1.00 mg/dL Final      Calcium Calcium   Date Value Ref Range Status   12/15/2024 9.2 8.6 - 10.5 mg/dL Final   12/14/2024 9.2 8.6 - 10.5 mg/dL Final      Albumin Albumin   Date Value Ref Range Status   12/15/2024 3.9 3.5 - 5.2 g/dL Final   12/14/2024 3.8 3.5 - 5.2 g/dL Final      AST  ALT  PT/INR:   AST (SGOT)   Date Value Ref Range Status   12/15/2024 19 1 - 32 U/L Final   12/14/2024 23 1 - 32 U/L Final     ALT (SGPT)   Date Value Ref Range Status   12/15/2024 28 1 - 33 U/L Final   12/14/2024 31 1 - 33 U/L Final     No results found for: \"PROTIME\"/No results found for: \"INR\"         Imaging Results (Last 72 Hours)       Procedure Component Value Units Date/Time    CT Abdomen Pelvis With Contrast [[id number]] Collected: 12/15/24 0936     Updated: 12/15/24 0951    Narrative:      CT ABDOMEN PELVIS W CONTRAST- 12/14/2024 10:41 PM     HISTORY: R sided abd pain x 3 days.       COMPARISON: CT scan dated 10/20/2019.     DOSE LENGTH PRODUCT: 722.69 mGy.cm. Automated exposure control was also  utilized to decrease patient radiation dose.     TECHNIQUE: Following the intravenous administration of contrast, helical  CT tomographic images of the abdomen and pelvis were acquired.  Multiplanar reformatted images were provided for review.     FINDINGS:  LOWER CHEST: The lung bases are clear. Included mediastinal structures  are unremarkable.     LIVER: Hepatomegaly with diffuse steatosis. Portal and hepatic veins are  patent. No focal lesion.     BILIARY SYSTEM: Gallbladder is nondistended. On axial image #23 there is  suspicion of a solitary gallstone at the gallbladder neck, also seen on  sagittal image #60. No overt gallbladder wall thickening. The common  bile duct is mildly dilated for age, measuring up to 9 mm in diameter.  There is mild intrahepatic biliary dilation as well " which is new.     PANCREAS: No focal pancreatic lesion identified.      SPLEEN: Unremarkable.     KIDNEYS AND ADRENALS: Adrenal glands are unremarkable. Kidneys are  unremarkable. The ureters are decompressed and normal in appearance.     RETROPERITONEUM: No mass, lymphadenopathy or hemorrhage.     GI TRACT: The stomach is nondistended. Small bowel loops are nondilated.   No inflammatory changes are seen along the colon. The appendix is  visualized and unremarkable.     OTHER: There is no mesenteric mass, lymphadenopathy, free fluid or free  air. The abdominal vascular structures are patent. No acute bony  abnormality. Lumbar facet arthropathy. L4-L5 level degenerative  anterolisthesis. Tiny fat-containing left inguinal hernia.     PELVIS: No mass lesion, fluid collection or significant lymphadenopathy  is seen in the pelvis. The urinary bladder is normal in appearance.  Prior hysterectomy.       Impression:         1.  There is concern for a solitary radiodense gallstone in the region  of the gallbladder neck by CT. This was not visible on the subsequent  ultrasound. The gallbladder is nondistended which suggests against acute  cholecystitis. The common bile duct is mildly dilated and there is a  mild intrahepatic biliary dilation as well. This is new when compared to  the October 2019 CT scan. I do not see any obvious radiodense  choledocholithiasis by CT. Differential for this new mild biliary  dilation would include choledocholithiasis or potentially sphincter of  Oddi dysfunction. I do not see any obstructing lesion at the level of  the ampulla/pancreatic head.           This report was signed and finalized on 12/15/2024 9:48 AM by Dr Everton Ly.       US Gallbladder [[id number]] Collected: 12/15/24 0829     Updated: 12/15/24 0835    Narrative:      US GALLBLADDER-12/15/2024 12:18 AM     REASON FOR EXAM: CT concerning for acute cholecystitis.       COMPARISON: CT scan dated 12/14/2024     TECHNIQUE: Multiple  longitudinal and transverse realtime sonographic  images of the right upper quadrant of the abdomen are obtained.  Ultrasound images and report are permanently stored in the PACS.     FINDINGS:    Pancreas: Included portions of the pancreatic head and neck are  unremarkable. The body and tail of the pancreas are obscured from view.     Liver: There are portions of the liver which are poorly assessed. There  is some underlying steatosis identified. No obvious focal liver lesion.  Portal veins are patent with hepatopetal flow.     Gallbladder: The gallbladder is nondistended. No gallstones identified.  No abnormal wall thickening identified.      Bile ducts: The CBD measures 0.6 cm in diameter. There is no  intrahepatic or extrahepatic ductal dilation.     Right kidney: Normal in size, shape and contour. No mass, hydronephrosis  or calculi. No perinephric fluid collection.       Other: The visualized abdominal aorta is normal in caliber.       Impression:      1. No evidence of acute cholecystitis. Gallbladder is nondistended and  there is no wall thickening or visible gallstones.  2. Liver steatosis.  3. These findings are in agreement with the critical and emergent  findings from the [facility] preliminary report.           This report was signed and finalized on 12/15/2024 8:32 AM by Dr Everton Ly.               Result Review:  I have personally reviewed the results from the time of this admission to 12/15/2024 15:27 CST and agree with these findings:  [x]  Laboratory list / accordion  [x]  Microbiology  []  Radiology  []  EKG/Telemetry   []  Cardiology/Vascular   []  Pathology  [x]  Old records  []  Other:  Most notable findings include: Most helpful finding I think was the right upper quadrant ultrasound.      Assessment & Plan       Intractable pain      The cause of the patient's abdominal pain is unclear.  It has been severe enough to bring her to the emergency department and get her admitted.  We do not have  any definite evidence of gallbladder disease.  I would proceed with EGD and colonoscopy tomorrow (requested).  If by chance the studies were normal I would probably follow-up with a HIDA scan.    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  Any results not available at time of inpatient discharge and/or GI clinic follow up should be managed by the hospitalist team, PCP, or outpatient gastroenterologist.

## 2024-12-15 NOTE — PLAN OF CARE
Goal Outcome Evaluation: Patient admitted from the ER with complaints of abdominal pain, nausea, vomiting for 3 days. Patient rates abdominal pain at an 8 on scale of 1-10. No complaints of nausea at this time. IV fluids infusing as ordered. Accuchecks ac and hs with sliding scale coverage as needed. Patient safety to be maintained this shift, continue to monitor and report abnormal to provider.

## 2024-12-16 ENCOUNTER — ANESTHESIA EVENT (OUTPATIENT)
Dept: GASTROENTEROLOGY | Facility: HOSPITAL | Age: 69
End: 2024-12-16
Payer: MEDICARE

## 2024-12-16 ENCOUNTER — ANESTHESIA (OUTPATIENT)
Dept: GASTROENTEROLOGY | Facility: HOSPITAL | Age: 69
End: 2024-12-16
Payer: MEDICARE

## 2024-12-16 PROBLEM — R10.9 INTRACTABLE ABDOMINAL PAIN: Status: ACTIVE | Noted: 2024-12-14

## 2024-12-16 LAB
ANION GAP SERPL CALCULATED.3IONS-SCNC: 9 MMOL/L (ref 5–15)
BASOPHILS # BLD AUTO: 0.03 10*3/MM3 (ref 0–0.2)
BASOPHILS NFR BLD AUTO: 0.4 % (ref 0–1.5)
BUN SERPL-MCNC: 12 MG/DL (ref 8–23)
BUN/CREAT SERPL: 14.1 (ref 7–25)
CALCIUM SPEC-SCNC: 9 MG/DL (ref 8.6–10.5)
CHLORIDE SERPL-SCNC: 106 MMOL/L (ref 98–107)
CO2 SERPL-SCNC: 24 MMOL/L (ref 22–29)
CREAT SERPL-MCNC: 0.85 MG/DL (ref 0.57–1)
DEPRECATED RDW RBC AUTO: 40.1 FL (ref 37–54)
EGFRCR SERPLBLD CKD-EPI 2021: 73.8 ML/MIN/1.73
EOSINOPHIL # BLD AUTO: 0.05 10*3/MM3 (ref 0–0.4)
EOSINOPHIL NFR BLD AUTO: 0.6 % (ref 0.3–6.2)
ERYTHROCYTE [DISTWIDTH] IN BLOOD BY AUTOMATED COUNT: 12.8 % (ref 12.3–15.4)
GLUCOSE BLDC GLUCOMTR-MCNC: 119 MG/DL (ref 70–130)
GLUCOSE BLDC GLUCOMTR-MCNC: 95 MG/DL (ref 70–130)
GLUCOSE BLDC GLUCOMTR-MCNC: 96 MG/DL (ref 70–130)
GLUCOSE SERPL-MCNC: 103 MG/DL (ref 65–99)
HCT VFR BLD AUTO: 43.6 % (ref 34–46.6)
HGB BLD-MCNC: 14.3 G/DL (ref 12–15.9)
IMM GRANULOCYTES # BLD AUTO: 0.03 10*3/MM3 (ref 0–0.05)
IMM GRANULOCYTES NFR BLD AUTO: 0.4 % (ref 0–0.5)
LYMPHOCYTES # BLD AUTO: 1.88 10*3/MM3 (ref 0.7–3.1)
LYMPHOCYTES NFR BLD AUTO: 22.9 % (ref 19.6–45.3)
MCH RBC QN AUTO: 28.4 PG (ref 26.6–33)
MCHC RBC AUTO-ENTMCNC: 32.8 G/DL (ref 31.5–35.7)
MCV RBC AUTO: 86.5 FL (ref 79–97)
MONOCYTES # BLD AUTO: 0.6 10*3/MM3 (ref 0.1–0.9)
MONOCYTES NFR BLD AUTO: 7.3 % (ref 5–12)
NEUTROPHILS NFR BLD AUTO: 5.62 10*3/MM3 (ref 1.7–7)
NEUTROPHILS NFR BLD AUTO: 68.4 % (ref 42.7–76)
NRBC BLD AUTO-RTO: 0 /100 WBC (ref 0–0.2)
PLATELET # BLD AUTO: 252 10*3/MM3 (ref 140–450)
PMV BLD AUTO: 10.3 FL (ref 6–12)
POTASSIUM SERPL-SCNC: 4 MMOL/L (ref 3.5–5.2)
QT INTERVAL: 424 MS
QTC INTERVAL: 419 MS
RBC # BLD AUTO: 5.04 10*6/MM3 (ref 3.77–5.28)
SODIUM SERPL-SCNC: 139 MMOL/L (ref 136–145)
WBC NRBC COR # BLD AUTO: 8.21 10*3/MM3 (ref 3.4–10.8)

## 2024-12-16 PROCEDURE — 99214 OFFICE O/P EST MOD 30 MIN: CPT | Performed by: INTERNAL MEDICINE

## 2024-12-16 PROCEDURE — 25010000002 LIDOCAINE PF 2% 2 % SOLUTION: Performed by: NURSE ANESTHETIST, CERTIFIED REGISTERED

## 2024-12-16 PROCEDURE — 45378 DIAGNOSTIC COLONOSCOPY: CPT | Performed by: INTERNAL MEDICINE

## 2024-12-16 PROCEDURE — 88305 TISSUE EXAM BY PATHOLOGIST: CPT | Performed by: INTERNAL MEDICINE

## 2024-12-16 PROCEDURE — 80048 BASIC METABOLIC PNL TOTAL CA: CPT | Performed by: STUDENT IN AN ORGANIZED HEALTH CARE EDUCATION/TRAINING PROGRAM

## 2024-12-16 PROCEDURE — 25010000002 KETOROLAC TROMETHAMINE PER 15 MG: Performed by: FAMILY MEDICINE

## 2024-12-16 PROCEDURE — G0378 HOSPITAL OBSERVATION PER HR: HCPCS

## 2024-12-16 PROCEDURE — 25010000002 PROPOFOL 10 MG/ML EMULSION: Performed by: NURSE ANESTHETIST, CERTIFIED REGISTERED

## 2024-12-16 PROCEDURE — 43239 EGD BIOPSY SINGLE/MULTIPLE: CPT | Performed by: INTERNAL MEDICINE

## 2024-12-16 PROCEDURE — 25010000002 ENOXAPARIN PER 10 MG: Performed by: SURGERY

## 2024-12-16 PROCEDURE — 25010000002 ENOXAPARIN PER 10 MG

## 2024-12-16 PROCEDURE — 25010000002 KETOROLAC TROMETHAMINE PER 15 MG: Performed by: SURGERY

## 2024-12-16 PROCEDURE — 82948 REAGENT STRIP/BLOOD GLUCOSE: CPT

## 2024-12-16 PROCEDURE — 85025 COMPLETE CBC W/AUTO DIFF WBC: CPT | Performed by: STUDENT IN AN ORGANIZED HEALTH CARE EDUCATION/TRAINING PROGRAM

## 2024-12-16 RX ORDER — TRAMADOL HYDROCHLORIDE 50 MG/1
25 TABLET ORAL EVERY 4 HOURS PRN
Status: DISCONTINUED | OUTPATIENT
Start: 2024-12-16 | End: 2024-12-20 | Stop reason: HOSPADM

## 2024-12-16 RX ORDER — LIDOCAINE 4 G/G
2 PATCH TOPICAL
Status: DISCONTINUED | OUTPATIENT
Start: 2024-12-16 | End: 2024-12-20 | Stop reason: HOSPADM

## 2024-12-16 RX ORDER — METOPROLOL SUCCINATE 50 MG/1
50 TABLET, EXTENDED RELEASE ORAL DAILY
Status: DISCONTINUED | OUTPATIENT
Start: 2024-12-17 | End: 2024-12-20 | Stop reason: HOSPADM

## 2024-12-16 RX ORDER — LIDOCAINE HYDROCHLORIDE 20 MG/ML
INJECTION, SOLUTION EPIDURAL; INFILTRATION; INTRACAUDAL; PERINEURAL AS NEEDED
Status: DISCONTINUED | OUTPATIENT
Start: 2024-12-16 | End: 2024-12-16 | Stop reason: SURG

## 2024-12-16 RX ORDER — PROPOFOL 10 MG/ML
VIAL (ML) INTRAVENOUS AS NEEDED
Status: DISCONTINUED | OUTPATIENT
Start: 2024-12-16 | End: 2024-12-16 | Stop reason: SURG

## 2024-12-16 RX ORDER — KETOROLAC TROMETHAMINE 15 MG/ML
15 INJECTION, SOLUTION INTRAMUSCULAR; INTRAVENOUS EVERY 6 HOURS PRN
Status: DISCONTINUED | OUTPATIENT
Start: 2024-12-16 | End: 2024-12-20 | Stop reason: HOSPADM

## 2024-12-16 RX ORDER — TRAMADOL HYDROCHLORIDE 50 MG/1
50 TABLET ORAL EVERY 6 HOURS PRN
Status: DISCONTINUED | OUTPATIENT
Start: 2024-12-16 | End: 2024-12-16

## 2024-12-16 RX ADMIN — KETOROLAC TROMETHAMINE 15 MG: 15 INJECTION, SOLUTION INTRAMUSCULAR; INTRAVENOUS at 16:40

## 2024-12-16 RX ADMIN — PANTOPRAZOLE SODIUM 40 MG: 40 TABLET, DELAYED RELEASE ORAL at 16:44

## 2024-12-16 RX ADMIN — LIDOCAINE HYDROCHLORIDE 200 MG: 20 INJECTION, SOLUTION EPIDURAL; INFILTRATION; INTRACAUDAL; PERINEURAL at 13:22

## 2024-12-16 RX ADMIN — PROPOFOL 400 MG: 10 INJECTION, EMULSION INTRAVENOUS at 13:23

## 2024-12-16 RX ADMIN — Medication 10 ML: at 22:13

## 2024-12-16 RX ADMIN — ENOXAPARIN SODIUM 40 MG: 100 INJECTION SUBCUTANEOUS at 16:43

## 2024-12-16 RX ADMIN — GLYCOPYRROLATE 0.1 MG: 0.2 INJECTION INTRAMUSCULAR; INTRAVENOUS at 13:22

## 2024-12-16 RX ADMIN — TRAMADOL HYDROCHLORIDE 50 MG: 50 TABLET, COATED ORAL at 15:13

## 2024-12-16 RX ADMIN — Medication 10 ML: at 16:48

## 2024-12-16 RX ADMIN — POLYETHYLENE GLYCOL 3350 0.5 BOTTLE: 17 POWDER, FOR SOLUTION ORAL at 04:04

## 2024-12-16 NOTE — ANESTHESIA POSTPROCEDURE EVALUATION
"Patient: Sada Casey    Procedure Summary       Date: 12/16/24 Room / Location: [facility] ENDOSCOPY 4 / [facility]    Anesthesia Start: 1322 Anesthesia Stop: 1352    Procedures:       ESOPHAGOGASTRODUODENOSCOPY WITH ANESTHESIA      COLONOSCOPY WITH ANESTHESIA Diagnosis:       Intractable abdominal pain      (Intractable abdominal pain [R10.9])    Surgeons: Espinoza Molina MD Provider: Senthil Hackett CRNA    Anesthesia Type: MAC ASA Status: 3 - Emergent            Anesthesia Type: MAC    Vitals  No vitals data found for the desired time range.          Post Anesthesia Care and Evaluation    Patient location during evaluation: PHASE II  Patient participation: complete - patient participated  Level of consciousness: awake and alert  Pain score: 0  Pain management: adequate    Airway patency: patent  Anesthetic complications: No anesthetic complications  PONV Status: none  Cardiovascular status: acceptable  Respiratory status: acceptable  Hydration status: acceptable    Comments: Blood pressure 145/69, pulse 64, temperature 98.2 °F (36.8 °C), temperature source Oral, resp. rate 16, height 160 cm (63\"), weight 98.4 kg (217 lb), SpO2 93%, not currently breastfeeding.    Pt discharged from PACU based on dilia score >8    "

## 2024-12-16 NOTE — ANESTHESIA PREPROCEDURE EVALUATION
Anesthesia Evaluation     Patient summary reviewed   no history of anesthetic complications:   NPO Solid Status: > 8 hours             Airway   Mallampati: II  Dental      Pulmonary    (+) ,sleep apnea on CPAP  (-) not a smoker  Cardiovascular   Exercise tolerance: excellent (>7 METS)    (+) hypertension, dysrhythmias Bradycardia, DVT      Neuro/Psych  (+) TIA, numbness  GI/Hepatic/Renal/Endo    (+) obesity, morbid obesity, GERD    Musculoskeletal     Abdominal   (+) obese   Substance History      OB/GYN          Other                          Anesthesia Plan    ASA 3 - emergent     MAC     (intractable epigastric pain)    Anesthetic plan, risks, benefits, and alternatives have been provided, discussed and informed consent has been obtained with: patient.

## 2024-12-16 NOTE — CASE MANAGEMENT/SOCIAL WORK
Discharge Planning Assessment  [facility]     Patient Name: Sada Casey  MRN: [id number]  Today's Date: 12/16/2024    Admit Date: 12/14/2024        Discharge Needs Assessment       Row Name 12/16/24 0952       Living Environment    People in Home alone    Current Living Arrangements home    Potentially Unsafe Housing Conditions none    In the past 12 months has the electric, gas, oil, or water company threatened to shut off services in your home? No    Primary Care Provided by self    Provides Primary Care For no one    Family Caregiver if Needed child(sherie), adult    Family Caregiver Names FIDELINA CERVANTES (Daughter)  [phone] (Home Phone)    Quality of Family Relationships helpful;involved;supportive    Able to Return to Prior Arrangements yes       Resource/Environmental Concerns    Resource/Environmental Concerns none    Transportation Concerns none       Transportation Needs    In the past 12 months, has lack of transportation kept you from medical appointments or from getting medications? no    In the past 12 months, has lack of transportation kept you from meetings, work, or from getting things needed for daily living? No       Food Insecurity    Within the past 12 months, you worried that your food would run out before you got the money to buy more. Never true    Within the past 12 months, the food you bought just didn't last and you didn't have money to get more. Never true       Transition Planning    Patient/Family Anticipates Transition to home    Patient/Family Anticipated Services at Transition none    Transportation Anticipated family or friend will provide       Discharge Needs Assessment    Equipment Currently Used at Home none    Concerns to be Addressed denies needs/concerns at this time    Do you want help finding or keeping work or a job? I do not need or want help    Do you want help with school or training? For example, starting or completing job training or getting a high school diploma,  GED or equivalent No    Anticipated Changes Related to Illness none    Equipment Needed After Discharge none    Discharge Coordination/Progress Patient lives alone but has daughter involved.  No DME used. PCP is Dr. Jennifer Hummel and she has [facility] coverage.  She is denying any current needs today. We will follow and be available to assist as needed.                   Discharge Plan    No documentation.                 Continued Care and Services - Admitted Since 12/14/2024    No active coordination exists for this encounter.          Demographic Summary    No documentation.                  Functional Status    No documentation.                  Psychosocial    No documentation.                  Abuse/Neglect    No documentation.                  Legal    No documentation.                  Substance Abuse    No documentation.                  Patient Forms    No documentation.                     Brianna Hercules RN

## 2024-12-16 NOTE — PLAN OF CARE
Goal Outcome Evaluation:  Plan of Care Reviewed With: patient        Progress: no change  Outcome Evaluation: a&ox4, vss. clear liquid diet until 12am, NPO after. Colon prep started at 1700, colonoscopy/EGD 12/16. up ad danilo, IVF, on room air, voiding. c/o ABD pain, prn pain med given with relief. Lovenox. call light in reach, safety maintained

## 2024-12-16 NOTE — PROGRESS NOTES
[facility] Medicine Services  INPATIENT PROGRESS NOTE    Patient Name: Sada Casey  Date of Admission: 12/14/2024  Today's Date: 12/16/24  Length of Stay: 0  Primary Care Physician: Jennifer Hummel DO    Subjective   Chief Complaint: Abdomen pain.  HPI   Plan for EGD and colonoscopy by GI today.  Slightly bradycardia, cut back Toprol.  White blood cells normal.  Hemoglobin normal.  Ongoing prep for colonoscopy today    Review of Systems   Constitutional:  Positive for activity change, appetite change and fatigue. Negative for chills and fever.   HENT:  Negative for hearing loss, nosebleeds, tinnitus and trouble swallowing.    Eyes:  Negative for visual disturbance.   Respiratory:  Negative for cough, chest tightness, shortness of breath and wheezing.    Cardiovascular:  Negative for chest pain, palpitations and leg swelling.   Gastrointestinal:  Negative for abdominal distention, abdominal pain, blood in stool, constipation, diarrhea, nausea and vomiting.   Endocrine: Negative for cold intolerance, heat intolerance, polydipsia, polyphagia and polyuria.   Genitourinary:  Negative for decreased urine volume, difficulty urinating, dysuria, flank pain, frequency and hematuria.   Musculoskeletal:  Negative for arthralgias, joint swelling and myalgias.   Skin:  Negative for rash.   Allergic/Immunologic: Negative for immunocompromised state.   Neurological:  Positive for weakness. Negative for dizziness, syncope, light-headedness and headaches.   Hematological:  Negative for adenopathy. Does not bruise/bleed easily.   Psychiatric/Behavioral:  Negative for confusion and sleep disturbance. The patient is not nervous/anxious.         All pertinent negatives and positives are as above. All other systems have been reviewed and are negative unless otherwise stated.     Objective    Temp:  [98 °F (36.7 °C)-99 °F (37.2 °C)] 98.8 °F (37.1 °C)  Heart Rate:  [55-73] 59  Resp:  [16]  16  BP: (105-147)/(46-68) 115/56  Physical Exam  Vitals and nursing note reviewed.   Constitutional:       General: She is not in acute distress.     Appearance: She is not toxic-appearing.   HENT:      Head: Normocephalic and atraumatic.      Mouth/Throat:      Mouth: Mucous membranes are moist.      Pharynx: Oropharynx is clear.   Eyes:      Extraocular Movements: Extraocular movements intact.      Conjunctiva/sclera: Conjunctivae normal.      Pupils: Pupils are equal, round, and reactive to light.   Cardiovascular:      Rate and Rhythm: Normal rate and regular rhythm.      Pulses: Normal pulses.      Heart sounds: No murmur heard.  Pulmonary:      Effort: Pulmonary effort is normal. No respiratory distress.      Breath sounds: Normal breath sounds. No wheezing or rhonchi.   Abdominal:      General: Bowel sounds are normal. There is no distension.      Palpations: Abdomen is soft.      Tenderness: There is no abdominal tenderness.      Comments: Obesity.   Musculoskeletal:         General: No swelling or tenderness. Normal range of motion.      Cervical back: Normal range of motion and neck supple. No muscular tenderness.   Skin:     General: Skin is warm and dry.      Capillary Refill: Capillary refill takes 2 to 3 seconds.      Findings: No erythema or rash.   Neurological:      General: No focal deficit present.      Mental Status: She is alert and oriented to person, place, and time.      Cranial Nerves: No cranial nerve deficit.      Motor: No weakness.   Psychiatric:         Mood and Affect: Mood normal.         Behavior: Behavior normal.           Results Review:  I have reviewed the labs, radiology results, and diagnostic studies.    Laboratory Data:   Results from last 7 days   Lab Units 12/16/24  0657 12/15/24  0825 12/14/24  2253   WBC 10*3/mm3 8.21 9.50 9.29   HEMOGLOBIN g/dL 14.3 13.4 14.1   HEMATOCRIT % 43.6 41.8 43.7   PLATELETS 10*3/mm3 252 232 237        Results from last 7 days   Lab Units  "12/16/24  0657 12/15/24  0825 12/14/24  2253   SODIUM mmol/L 139 138 136   POTASSIUM mmol/L 4.0 3.6 3.8   CHLORIDE mmol/L 106 101 100   CO2 mmol/L 24.0 25.0 23.0   BUN mg/dL 12 10 11   CREATININE mg/dL 0.85 0.55* 0.60   CALCIUM mg/dL 9.0 9.2 9.2   BILIRUBIN mg/dL  --  0.6 0.5   ALK PHOS U/L  --  74 78   ALT (SGPT) U/L  --  28 31   AST (SGOT) U/L  --  19 23   GLUCOSE mg/dL 103* 129* 144*       Culture Data:   No results found for: \"BLOODCX\", \"URINECX\", \"WOUNDCX\", \"MRSACX\", \"RESPCX\", \"STOOLCX\"    Radiology Data:   Imaging Results (Last 24 Hours)       ** No results found for the last 24 hours. **            I have reviewed the patient's current medications.     Assessment/Plan   Assessment  Active Hospital Problems    Diagnosis    • **Intractable pain    • Intractable abdominal pain    • Irritable bowel syndrome with diarrhea    • Heartburn    • Belching      HPI . 69-year-old female patient with a medical history of diabetes mellitus type 2 and hypertension, presenting to the ED with intractable epigastric pain.  She has been having this abdominal pain for the last 3 days, and was associated with nausea and nonbloody vomiting.  She feels bloated, but is having normal bowel movements.  She denies any fever or chills.  She denies any chest pain shortness of breath.       Treatment Plan  Abdominal pain/common bile duct dilatation.    CT abdomen pelvic- concern for a solitary radiodense gallstone in the region of the gallbladder neck by CT- not visible on the subsequent ultrasound- gallbladder is nondistended which suggests against acute cholecystitis-common bile duct is mildly dilated and there is a mild intrahepatic biliary dilation as well- new when compared to the October 2019 CT scan- not see any obvious radiodense choledocholithiasis by CT- new mild biliary dilation would include choledocholithiasis or potentially sphincter of Oddi dysfunction-do not see any obstructing lesion at the level of the ampulla/pancreatic " head.  Gallbladder ultrasound- No evidence of acute cholecystitis- Gallbladder is nondistended and  there is no wall thickening or visible gallstones, Liver steatosis.   Plan for EGD and colonoscopy today by GI... If negative then should get HIDA.    Diabetes . Low-dose sliding scale.  Hemoglobin A1c 6.6.    Hypertension/hyperlipidemia.  Cut back Toprol due to bradycardia.    Reflux.  Protonix.  Zofran as needed.    Pain . Dilaudid as needed.  Ultram as needed.    Lovenox prophylaxis.    Obesity.  BMI is 38.    Nutrition.  NPO.    Medical Decision Making  Number and Complexity of problems: Abdomen pain.  Dilated gallbladder.  Differential Diagnosis: None.    Conditions and Status        Condition is unchanged.     MDM Data  External documents reviewed: Previous note.  Cardiac tracing (EKG, telemetry) interpretation: No monitor.  Radiology interpretation: CT scan/ultrasound  Labs reviewed: Laboratory .   Any tests that were considered but not ordered: Laboratory in AM.     Decision rules/scores evaluated (example [id number], Wells, etc): None     Discussed with: Patient     Care Planning  Shared decision making: Patient  Code status and discussions: Full code    Disposition  Social Determinants of Health that impact treatment or disposition: From home  1 to 3 days        Electronically signed by Harshil Augustin MD, 12/16/24, 10:02 CST.

## 2024-12-16 NOTE — PROGRESS NOTES
[facility]  Inpatient Progress Note      Date of Admission: 2024  Date of Service:  24    Reason for Follow Up: Abdominal pain    Subjective     Subjective:   Continue to have abdominal pain but significantly improved  Stated that the intensity is 3/10  Denies nausea vomiting  Gastroscopy showed small hiatal hernia, moderate gastritis in the body status post biopsy, normal duodenum status post biopsy  Colonoscopy was normal to the terminal ileum except sigmoid diverticulosis, internal and external hemorrhoids      Current Facility-Administered Medications:     dextrose (D50W) (25 g/50 mL) IV injection 25 g, 25 g, Intravenous, Q15 Min PRN, Eun Fuentes MD    dextrose (GLUTOSE) oral gel 15 g, 15 g, Oral, Q15 Min PRN, Eun Fuentes MD    Enoxaparin Sodium (LOVENOX) syringe 40 mg, 40 mg, Subcutaneous, Daily, Eun Fuentes MD, 40 mg at 12/15/24 0936    glucagon (GLUCAGEN) injection 1 mg, 1 mg, Intramuscular, Q15 Min PRN, Eun Fuentes MD    Insulin Lispro (humaLOG) injection 2-7 Units, 2-7 Units, Subcutaneous, 4x Daily AC & at Bedtime, Eun Fuentes MD    lactated ringers infusion, 30 mL/hr, Intravenous, Continuous, Vinicius Ramirez MD, Last Rate: 30 mL/hr at 24 0407, 30 mL/hr at 24 0407    [START ON 2024] metoprolol succinate XL (TOPROL-XL) 24 hr tablet 50 mg, 50 mg, Oral, Daily, Harshil Augustin MD    [] HYDROmorphone (DILAUDID) injection 0.5 mg, 0.5 mg, Intravenous, Q4H PRN, 0.5 mg at 12/15/24 2004 **AND** naloxone (NARCAN) injection 0.4 mg, 0.4 mg, Intravenous, Q5 Min PRN, Eun Fuentes MD    ondansetron ODT (ZOFRAN-ODT) disintegrating tablet 4 mg, 4 mg, Oral, Q6H PRN **OR** ondansetron (ZOFRAN) injection 4 mg, 4 mg, Intravenous, Q6H PRN, Eun Fuentes MD    pantoprazole (PROTONIX) EC tablet 40 mg, 40 mg, Oral, Daily, Eun Fuentes MD, 40 mg at 12/15/24 0936    sodium chloride 0.9 % flush 10 mL, 10 mL, Intravenous, Q12H, Eun Fuentes,  MD, 10 mL at 12/15/24 2004    sodium chloride 0.9 % flush 10 mL, 10 mL, Intravenous, PRN, Eun Fuentes MD    sodium chloride 0.9 % infusion 40 mL, 40 mL, Intravenous, PRN, Eun Fuentes MD    traMADol (ULTRAM) tablet 50 mg, 50 mg, Oral, Q6H PRN, Harshil Augustin MD    Review of Systems     Constitution:  negative for chills, fatigue and fevers  Eyes:  negative for blurriness and change of vision  ENT:   negative for sore throat and voice change  Respiratory: negative for  cough and shortness of air  Cardiovascular:  Negative for chest pain or palpitations  Gastrointestinal:  negative for  See HPI  Genitourinary:  negative for  blood in urine and painful urination  Integument: negative for  rash and redness  Hematologic / Lymphatic: negative for  excessive bleeding and easy bruising  Musculoskeletal: negative for  joint pain and joint stiffness out of the ordinary  Neurological:  negative for  seizures and speech abnormality  Behavioral/Psych:  negative for  anxiety and depression out of the ordinary  Endocrine: negative for  diabetes and weight loss, unintended  Allergies / Immunologic:  negative for  anaphylaxis and rash        Objective     Vital Signs  Temp:  [98 °F (36.7 °C)-99 °F (37.2 °C)] 98.8 °F (37.1 °C)  Heart Rate:  [55-73] 59  Resp:  [16] 16  BP: (105-147)/(46-68) 115/56  Body mass index is 38.44 kg/m².    Intake/Output Summary (Last 24 hours) at 12/16/2024 1115  Last data filed at 12/16/2024 0026  Gross per 24 hour   Intake 120 ml   Output 600 ml   Net -480 ml     No intake/output data recorded.       Physical Exam:   General: patient awake, alert and cooperative   Eyes: Normal lids and lashes, no scleral icterus   Neck: supple, normal ROM   Skin: warm and dry, not jaundiced   Cardiovascular: regular rhythm and rate, no murmurs auscultated   Pulm: clear to auscultation bilaterally, regular and unlabored   Abdomen: soft, nontender, nondistended; normal bowel sounds   Rectal: deferred   Extremities:  no rash or edema   Psychiatric: Normal mood and behavior; memory intact         Results Review:    I have reviewed all of the patients current test results  Results from last 7 days   Lab Units 12/16/24  0657 12/15/24  0825 12/14/24  2253   WBC 10*3/mm3 8.21 9.50 9.29   HEMOGLOBIN g/dL 14.3 13.4 14.1   HEMATOCRIT % 43.6 41.8 43.7   PLATELETS 10*3/mm3 252 232 237       Results from last 7 days   Lab Units 12/16/24  0657 12/15/24  0825 12/14/24 2253   SODIUM mmol/L 139 138 136   POTASSIUM mmol/L 4.0 3.6 3.8   CHLORIDE mmol/L 106 101 100   CO2 mmol/L 24.0 25.0 23.0   BUN mg/dL 12 10 11   CREATININE mg/dL 0.85 0.55* 0.60   CALCIUM mg/dL 9.0 9.2 9.2   BILIRUBIN mg/dL  --  0.6 0.5   ALK PHOS U/L  --  74 78   ALT (SGPT) U/L  --  28 31   AST (SGOT) U/L  --  19 23   GLUCOSE mg/dL 103* 129* 144*             Lab Results   Lab Value Date/Time    LIPASE 25 12/14/2024 2253       Radiology:    Imaging Results (Last 24 Hours)       ** No results found for the last 24 hours. **              Assessment & Plan       Intractable pain    Heartburn    Belching    Irritable bowel syndrome with diarrhea    Intractable abdominal pain    Hypertension    Hyperlipidemia      Impression/Plan     Intractable pain     The cause of the patient's abdominal pain is unclear.    It has been severe enough to bring her to the emergency department and get her admitted.    We do not have any definite evidence of gallbladder disease.  Gastroscopy showed small hiatal hernia, moderate gastritis status post biopsy  Colonoscopy showed internal and external hemorrhoids, left-sided diverticulosis    I recommend obtaining HIDA scan.      Electronically signed by Espinoza Molina MD, 12/16/24, 11:15 AM CST.     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  Any results not available at time of inpatient discharge and/or GI clinic follow up  should be managed by the hospitalist team, PCP, or outpatient gastroenterologist.    Espinoza Molina MD  12/16/24  11:15 CST

## 2024-12-16 NOTE — PLAN OF CARE
Goal Outcome Evaluation:  Plan of Care Reviewed With: patient         Patient c/o pain, PRN pain med given. No c/o nausea. Patient A&O x4. NPO since midnight. Patient on bowel prep. Patient on 2 L/min O2 while sleeping.

## 2024-12-16 NOTE — PLAN OF CARE
Problem: Adult Inpatient Plan of Care  Goal: Plan of Care Review  Outcome: Progressing  Flowsheets (Taken 12/16/2024 1630)  Outcome Evaluation: Patient RA, IV CDI. Advanced to full liquid diet. Endo and Colonoscopy, see GI MD notes. Up adlib. Daughter at bedside. Lovenox. C/O pain, see MAR. VSS, safety maintained.

## 2024-12-16 NOTE — PROGRESS NOTES
Brief progress note:    Patient seen, her pain is around her epigastric area and right upper quadrant traveling around her right flank.  But she states her pain is better controlled now.  She has not had any episodes of vomiting.  CT scan showing dilation of common bile ducts.  Ultrasound of gallbladder showing no cholelithiasis or cholecystitis.  GI consulted -plan for endoscopy tomorrow.  If negative she may need a HIDA scan.    Funmilayo Bains MD   Hospitalist

## 2024-12-17 ENCOUNTER — APPOINTMENT (OUTPATIENT)
Dept: NUCLEAR MEDICINE | Facility: HOSPITAL | Age: 69
End: 2024-12-17
Payer: MEDICARE

## 2024-12-17 LAB
ALBUMIN SERPL-MCNC: 3.3 G/DL (ref 3.5–5.2)
ALBUMIN/GLOB SERPL: 1.2 G/DL
ALP SERPL-CCNC: 67 U/L (ref 39–117)
ALT SERPL W P-5'-P-CCNC: 21 U/L (ref 1–33)
ANION GAP SERPL CALCULATED.3IONS-SCNC: 11 MMOL/L (ref 5–15)
AST SERPL-CCNC: 18 U/L (ref 1–32)
BASOPHILS # BLD AUTO: 0.02 10*3/MM3 (ref 0–0.2)
BASOPHILS NFR BLD AUTO: 0.3 % (ref 0–1.5)
BILIRUB SERPL-MCNC: 0.5 MG/DL (ref 0–1.2)
BUN SERPL-MCNC: 10 MG/DL (ref 8–23)
BUN/CREAT SERPL: 14.3 (ref 7–25)
CALCIUM SPEC-SCNC: 8.5 MG/DL (ref 8.6–10.5)
CHLORIDE SERPL-SCNC: 104 MMOL/L (ref 98–107)
CHOLEST SERPL-MCNC: 169 MG/DL (ref 0–200)
CO2 SERPL-SCNC: 25 MMOL/L (ref 22–29)
CREAT SERPL-MCNC: 0.7 MG/DL (ref 0.57–1)
CYTO UR: NORMAL
DEPRECATED RDW RBC AUTO: 42.3 FL (ref 37–54)
EGFRCR SERPLBLD CKD-EPI 2021: 93.2 ML/MIN/1.73
EOSINOPHIL # BLD AUTO: 0.11 10*3/MM3 (ref 0–0.4)
EOSINOPHIL NFR BLD AUTO: 1.8 % (ref 0.3–6.2)
ERYTHROCYTE [DISTWIDTH] IN BLOOD BY AUTOMATED COUNT: 13.5 % (ref 12.3–15.4)
GLOBULIN UR ELPH-MCNC: 2.8 GM/DL
GLUCOSE BLDC GLUCOMTR-MCNC: 104 MG/DL (ref 70–130)
GLUCOSE BLDC GLUCOMTR-MCNC: 114 MG/DL (ref 70–130)
GLUCOSE BLDC GLUCOMTR-MCNC: 123 MG/DL (ref 70–130)
GLUCOSE BLDC GLUCOMTR-MCNC: 88 MG/DL (ref 70–130)
GLUCOSE SERPL-MCNC: 119 MG/DL (ref 65–99)
HCT VFR BLD AUTO: 38.8 % (ref 34–46.6)
HDLC SERPL-MCNC: 31 MG/DL (ref 40–60)
HGB BLD-MCNC: 12.2 G/DL (ref 12–15.9)
IMM GRANULOCYTES # BLD AUTO: 0.01 10*3/MM3 (ref 0–0.05)
IMM GRANULOCYTES NFR BLD AUTO: 0.2 % (ref 0–0.5)
LAB AP CASE REPORT: NORMAL
LDLC SERPL CALC-MCNC: 103 MG/DL (ref 0–100)
LDLC/HDLC SERPL: 3.16 {RATIO}
LYMPHOCYTES # BLD AUTO: 2.03 10*3/MM3 (ref 0.7–3.1)
LYMPHOCYTES NFR BLD AUTO: 32.5 % (ref 19.6–45.3)
Lab: NORMAL
MCH RBC QN AUTO: 27.1 PG (ref 26.6–33)
MCHC RBC AUTO-ENTMCNC: 31.4 G/DL (ref 31.5–35.7)
MCV RBC AUTO: 86.2 FL (ref 79–97)
MONOCYTES # BLD AUTO: 0.47 10*3/MM3 (ref 0.1–0.9)
MONOCYTES NFR BLD AUTO: 7.5 % (ref 5–12)
NEUTROPHILS NFR BLD AUTO: 3.6 10*3/MM3 (ref 1.7–7)
NEUTROPHILS NFR BLD AUTO: 57.7 % (ref 42.7–76)
NRBC BLD AUTO-RTO: 0 /100 WBC (ref 0–0.2)
PATH REPORT.FINAL DX SPEC: NORMAL
PATH REPORT.GROSS SPEC: NORMAL
PLATELET # BLD AUTO: 202 10*3/MM3 (ref 140–450)
PMV BLD AUTO: 9.3 FL (ref 6–12)
POTASSIUM SERPL-SCNC: 3.7 MMOL/L (ref 3.5–5.2)
PROT SERPL-MCNC: 6.1 G/DL (ref 6–8.5)
RBC # BLD AUTO: 4.5 10*6/MM3 (ref 3.77–5.28)
SODIUM SERPL-SCNC: 140 MMOL/L (ref 136–145)
TRIGL SERPL-MCNC: 200 MG/DL (ref 0–150)
TSH SERPL DL<=0.05 MIU/L-ACNC: 1.28 UIU/ML (ref 0.27–4.2)
VLDLC SERPL-MCNC: 35 MG/DL (ref 5–40)
WBC NRBC COR # BLD AUTO: 6.24 10*3/MM3 (ref 3.4–10.8)

## 2024-12-17 PROCEDURE — G0378 HOSPITAL OBSERVATION PER HR: HCPCS

## 2024-12-17 PROCEDURE — 25010000002 MORPHINE PER 10 MG: Performed by: SURGERY

## 2024-12-17 PROCEDURE — 80053 COMPREHEN METABOLIC PANEL: CPT | Performed by: FAMILY MEDICINE

## 2024-12-17 PROCEDURE — 82948 REAGENT STRIP/BLOOD GLUCOSE: CPT

## 2024-12-17 PROCEDURE — 25010000002 KETOROLAC TROMETHAMINE PER 15 MG: Performed by: FAMILY MEDICINE

## 2024-12-17 PROCEDURE — 99214 OFFICE O/P EST MOD 30 MIN: CPT | Performed by: INTERNAL MEDICINE

## 2024-12-17 PROCEDURE — 34310000005 TECHNETIUM TC 99M MEBROFENIN KIT: Performed by: FAMILY MEDICINE

## 2024-12-17 PROCEDURE — 25010000002 KETOROLAC TROMETHAMINE PER 15 MG: Performed by: SURGERY

## 2024-12-17 PROCEDURE — 25010000002 MORPHINE PER 10 MG: Performed by: INTERNAL MEDICINE

## 2024-12-17 PROCEDURE — 80061 LIPID PANEL: CPT | Performed by: FAMILY MEDICINE

## 2024-12-17 PROCEDURE — 85025 COMPLETE CBC W/AUTO DIFF WBC: CPT | Performed by: STUDENT IN AN ORGANIZED HEALTH CARE EDUCATION/TRAINING PROGRAM

## 2024-12-17 PROCEDURE — A9537 TC99M MEBROFENIN: HCPCS | Performed by: FAMILY MEDICINE

## 2024-12-17 PROCEDURE — 84443 ASSAY THYROID STIM HORMONE: CPT | Performed by: FAMILY MEDICINE

## 2024-12-17 PROCEDURE — 25010000002 ENOXAPARIN PER 10 MG: Performed by: SURGERY

## 2024-12-17 PROCEDURE — 25010000002 ENOXAPARIN PER 10 MG

## 2024-12-17 PROCEDURE — 25010000002 PIPERACILLIN SOD-TAZOBACTAM PER 1 G: Performed by: FAMILY MEDICINE

## 2024-12-17 PROCEDURE — 78226 HEPATOBILIARY SYSTEM IMAGING: CPT

## 2024-12-17 RX ORDER — HYDROCORTISONE ACETATE 25 MG/1
25 SUPPOSITORY RECTAL DAILY
Status: DISCONTINUED | OUTPATIENT
Start: 2024-12-17 | End: 2024-12-20 | Stop reason: HOSPADM

## 2024-12-17 RX ORDER — MORPHINE SULFATE 2 MG/ML
2 INJECTION, SOLUTION INTRAMUSCULAR; INTRAVENOUS EVERY 4 HOURS PRN
Status: DISCONTINUED | OUTPATIENT
Start: 2024-12-17 | End: 2024-12-20 | Stop reason: HOSPADM

## 2024-12-17 RX ORDER — KIT FOR THE PREPARATION OF TECHNETIUM TC 99M MEBROFENIN 45 MG/10ML
1 INJECTION, POWDER, LYOPHILIZED, FOR SOLUTION INTRAVENOUS
Status: COMPLETED | OUTPATIENT
Start: 2024-12-17 | End: 2024-12-17

## 2024-12-17 RX ADMIN — KETOROLAC TROMETHAMINE 15 MG: 15 INJECTION, SOLUTION INTRAMUSCULAR; INTRAVENOUS at 17:52

## 2024-12-17 RX ADMIN — Medication 10 ML: at 17:19

## 2024-12-17 RX ADMIN — KETOROLAC TROMETHAMINE 15 MG: 15 INJECTION, SOLUTION INTRAMUSCULAR; INTRAVENOUS at 00:30

## 2024-12-17 RX ADMIN — MORPHINE SULFATE 2 MG: 2 INJECTION, SOLUTION INTRAMUSCULAR; INTRAVENOUS at 21:46

## 2024-12-17 RX ADMIN — KETOROLAC TROMETHAMINE 15 MG: 15 INJECTION, SOLUTION INTRAMUSCULAR; INTRAVENOUS at 11:36

## 2024-12-17 RX ADMIN — PIPERACILLIN AND TAZOBACTAM 3.38 G: 3; .375 INJECTION, POWDER, FOR SOLUTION INTRAVENOUS at 22:36

## 2024-12-17 RX ADMIN — PANTOPRAZOLE SODIUM 40 MG: 40 TABLET, DELAYED RELEASE ORAL at 17:52

## 2024-12-17 RX ADMIN — Medication 10 ML: at 21:02

## 2024-12-17 RX ADMIN — MEBROFENIN 1 DOSE: 45 INJECTION, POWDER, LYOPHILIZED, FOR SOLUTION INTRAVENOUS at 10:07

## 2024-12-17 RX ADMIN — PIPERACILLIN AND TAZOBACTAM 3.38 G: 3; .375 INJECTION, POWDER, FOR SOLUTION INTRAVENOUS at 17:14

## 2024-12-17 RX ADMIN — ENOXAPARIN SODIUM 40 MG: 100 INJECTION SUBCUTANEOUS at 17:52

## 2024-12-17 NOTE — PLAN OF CARE
Problem: Adult Inpatient Plan of Care  Goal: Plan of Care Review  Outcome: Progressing  Flowsheets (Taken 12/17/2024 1174)  Outcome Evaluation: Patient RA during day. IV CDI, IID. C/O pain, see MAR. HIDA scan, see results. Surgery Consulted. NPO. Daughter at bedside. Up adlib. VSS, safety maintained.

## 2024-12-17 NOTE — PLAN OF CARE
Goal Outcome Evaluation:  Plan of Care Reviewed With: patient        Patient c/o pain in abdomen, PRN pain med given. No c/o nausea. NPO since midnight. A&O x4. Patient on 2 L/min O2 while sleeping.

## 2024-12-17 NOTE — PROGRESS NOTES
[facility] Medicine Services  INPATIENT PROGRESS NOTE    Patient Name: Sada Casey  Date of Admission: 12/14/2024  Today's Date: 12/17/24  Length of Stay: 0  Primary Care Physician: Jennifer Hummel DO    Subjective   Chief Complaint: Back pain.  HPI   Back pain is reproducible, Toradol worked well last night.  Blood pressure stable, afebrile.  EGD and colonoscopy discussed with patient, plan for HIDA scan today.  Patient does use a CPAP machine at night, patient to bring for home to be used tonight.    Review of Systems   Constitutional:  Positive for activity change, appetite change and fatigue. Negative for chills and fever.   HENT:  Negative for hearing loss, nosebleeds, tinnitus and trouble swallowing.    Eyes:  Negative for visual disturbance.   Respiratory:  Negative for cough, chest tightness, shortness of breath and wheezing.    Cardiovascular:  Negative for chest pain, palpitations and leg swelling.   Gastrointestinal:  Negative for abdominal distention, abdominal pain, blood in stool, constipation, diarrhea, nausea and vomiting.   Endocrine: Negative for cold intolerance, heat intolerance, polydipsia, polyphagia and polyuria.   Genitourinary:  Negative for decreased urine volume, difficulty urinating, dysuria, flank pain, frequency and hematuria.   Musculoskeletal:  Negative for arthralgias, joint swelling and myalgias.   Skin:  Negative for rash.   Allergic/Immunologic: Negative for immunocompromised state.   Neurological:  Positive for weakness. Negative for dizziness, syncope, light-headedness and headaches.   Hematological:  Negative for adenopathy. Does not bruise/bleed easily.   Psychiatric/Behavioral:  Negative for confusion and sleep disturbance. The patient is not nervous/anxious.       All pertinent negatives and positives are as above. All other systems have been reviewed and are negative unless otherwise stated.     Objective    Temp:  [98.2 °F  (36.8 °C)-98.7 °F (37.1 °C)] 98.7 °F (37.1 °C)  Heart Rate:  [64-79] 78  Resp:  [16-27] 16  BP: ()/(50-98) 135/61  Physical Exam  Vitals and nursing note reviewed.   Constitutional:       General: She is not in acute distress.     Appearance: She is not toxic-appearing.   HENT:      Head: Normocephalic and atraumatic.      Mouth/Throat:      Mouth: Mucous membranes are moist.      Pharynx: Oropharynx is clear.   Eyes:      Extraocular Movements: Extraocular movements intact.      Conjunctiva/sclera: Conjunctivae normal.      Pupils: Pupils are equal, round, and reactive to light.   Cardiovascular:      Rate and Rhythm: Normal rate and regular rhythm.      Pulses: Normal pulses.      Heart sounds: No murmur heard.  Pulmonary:      Effort: Pulmonary effort is normal. No respiratory distress.      Breath sounds: Normal breath sounds. No wheezing or rhonchi.   Abdominal:      General: Bowel sounds are normal. There is no distension.      Palpations: Abdomen is soft.      Tenderness: There is no abdominal tenderness.      Comments: Obesity.   Musculoskeletal:         General: No swelling or tenderness. Normal range of motion.      Cervical back: Normal range of motion and neck supple. No muscular tenderness.   Skin:     General: Skin is warm and dry.      Capillary Refill: Capillary refill takes 2 to 3 seconds.      Findings: No erythema or rash.   Neurological:      General: No focal deficit present.      Mental Status: She is alert and oriented to person, place, and time.      Cranial Nerves: No cranial nerve deficit.      Motor: No weakness.   Psychiatric:         Mood and Affect: Mood normal.         Behavior: Behavior normal.          Results Review:  I have reviewed the labs, radiology results, and diagnostic studies.    Laboratory Data:   Results from last 7 days   Lab Units 12/17/24  0445 12/16/24  0657 12/15/24  0825   WBC 10*3/mm3 6.24 8.21 9.50   HEMOGLOBIN g/dL 12.2 14.3 13.4   HEMATOCRIT % 38.8 43.6  "41.8   PLATELETS 10*3/mm3 202 252 232        Results from last 7 days   Lab Units 12/17/24  0237 12/16/24  0657 12/15/24  0825 12/14/24  2253   SODIUM mmol/L 140 139 138 136   POTASSIUM mmol/L 3.7 4.0 3.6 3.8   CHLORIDE mmol/L 104 106 101 100   CO2 mmol/L 25.0 24.0 25.0 23.0   BUN mg/dL 10 12 10 11   CREATININE mg/dL 0.70 0.85 0.55* 0.60   CALCIUM mg/dL 8.5* 9.0 9.2 9.2   BILIRUBIN mg/dL 0.5  --  0.6 0.5   ALK PHOS U/L 67  --  74 78   ALT (SGPT) U/L 21  --  28 31   AST (SGOT) U/L 18  --  19 23   GLUCOSE mg/dL 119* 103* 129* 144*       Culture Data:   No results found for: \"BLOODCX\", \"URINECX\", \"WOUNDCX\", \"MRSACX\", \"RESPCX\", \"STOOLCX\"    Radiology Data:   Imaging Results (Last 24 Hours)       ** No results found for the last 24 hours. **            I have reviewed the patient's current medications.     Assessment/Plan   Assessment  Active Hospital Problems    Diagnosis     **Intractable pain     Hypertension     Hyperlipidemia     Intractable abdominal pain     Irritable bowel syndrome with diarrhea     Heartburn     Belching        HPI . 69-year-old female patient with a medical history of diabetes mellitus type 2 and hypertension, presenting to the ED with intractable epigastric pain.  She has been having this abdominal pain for the last 3 days, and was associated with nausea and nonbloody vomiting.  She feels bloated, but is having normal bowel movements.  She denies any fever or chills.  She denies any chest pain shortness of breath.        Treatment Plan  Abdominal pain/common bile duct dilatation.    CT abdomen pelvic- concern for a solitary radiodense gallstone in the region of the gallbladder neck by CT- not visible on the subsequent ultrasound- gallbladder is nondistended which suggests against acute cholecystitis-common bile duct is mildly dilated and there is a mild intrahepatic biliary dilation as well- new when compared to the October 2019 CT scan- not see any obvious radiodense choledocholithiasis by " CT- new mild biliary dilation would include choledocholithiasis or potentially sphincter of Oddi dysfunction-do not see any obstructing lesion at the level of the ampulla/pancreatic head.  Gallbladder ultrasound- No evidence of acute cholecystitis- Gallbladder is nondistended and  there is no wall thickening or visible gallstones, Liver steatosis.   EGD-Small hiatal hernia. - Erythematous mucosa in the gastric body. Biopsied. - Normal examined duodenum. Biopsied.  Colonoscopy- ileum was normal. - Diverticulosis in the sigmoid colon. - Non-bleeding external and internal hemorrhoids. - No specimens collected.  HIDA pending.    Small hiatal hernia hernia.  Protonix.  Zofran as needed.    Internal hemorrhoids.  Anusol suppository.    Sleep apnea.  Patient is on a CPAP machine at home.  Patient will bring it up to be used.  Patient is on room air..      Diabetes . Low-dose sliding scale.  Hemoglobin A1c 6.6.     Hypertension/hyperlipidemia.  Toprol.     Reflux.  Protonix.  Zofran as needed.     Pain .  Ultram as needed.  Toradol as needed.  Lidocaine patch.  Voltaren gel.  .     Lovenox prophylaxis.     Obesity.  BMI is 38.     Nutrition.  NPO.     Medical Decision Making  Number and Complexity of problems: Abdomen pain.  Dilated gallbladder.  Differential Diagnosis: None.     Conditions and Status        Condition is unchanged.     [facility] Data  External documents reviewed: Previous note.  Cardiac tracing (EKG, telemetry) interpretation: No monitor.  Radiology interpretation: CT scan/ultrasound  Labs reviewed: Laboratory .   Any tests that were considered but not ordered: Laboratory in AM.     Decision rules/scores evaluated (example [id number], Wells, etc): None     Discussed with: Patient     Care Planning  Shared decision making: Patient  Code status and discussions: Full code     Disposition  Social Determinants of Health that impact treatment or disposition: From home  1 to 2 days        Electronically signed by Harshil  SERAFIN Augustin MD, 12/17/24, 09:40 CST.

## 2024-12-17 NOTE — PROGRESS NOTES
[facility]  Inpatient Progress Note      Date of Admission: 2024  Date of Service:  24    Reason for Follow Up: Abdominal pain    Subjective     Subjective:   Continue to have abdominal pain but partially improved   Stated that the intensity is 3/10  Denies nausea vomiting  Gastroscopy showed small hiatal hernia, moderate gastritis in the body status post biopsy, normal duodenum status post biopsy  Colonoscopy was normal to the terminal ileum except sigmoid diverticulosis, internal and external hemorrhoids  Had HIDA scan today, results pending      Current Facility-Administered Medications:     dextrose (D50W) (25 g/50 mL) IV injection 25 g, 25 g, Intravenous, Q15 Min PRN, Eun Fuentes MD    dextrose (GLUTOSE) oral gel 15 g, 15 g, Oral, Q15 Min PRN, Eun Fuentes MD    Diclofenac Sodium (VOLTAREN) 1 % gel 4 g, 4 g, Topical, 4x Daily, Harshil Augustin MD    Enoxaparin Sodium (LOVENOX) syringe 40 mg, 40 mg, Subcutaneous, Daily, Eun Fuentes MD, 40 mg at 24 1643    glucagon (GLUCAGEN) injection 1 mg, 1 mg, Intramuscular, Q15 Min PRN, Eun Fuentes MD    hydrocortisone (ANUSOL-HC) suppository 25 mg, 25 mg, Rectal, Daily, Harshil Augustin MD    Insulin Lispro (humaLOG) injection 2-7 Units, 2-7 Units, Subcutaneous, 4x Daily AC & at Bedtime, Eun Fuentes MD    ketorolac (TORADOL) injection 15 mg, 15 mg, Intravenous, Q6H PRN, Harshil Augustin MD, 15 mg at 24 1136    Lidocaine 4 % 2 patch, 2 patch, Transdermal, Q24H, Harshil Augustin MD    metoprolol succinate XL (TOPROL-XL) 24 hr tablet 50 mg, 50 mg, Oral, Daily, Harshil Augustin MD    [] HYDROmorphone (DILAUDID) injection 0.5 mg, 0.5 mg, Intravenous, Q4H PRN, 0.5 mg at 12/15/24 2004 **AND** naloxone (NARCAN) injection 0.4 mg, 0.4 mg, Intravenous, Q5 Min PRN, Eun Fuentes MD    ondansetron ODT (ZOFRAN-ODT) disintegrating tablet 4 mg, 4 mg, Oral, Q6H PRN **OR** ondansetron (ZOFRAN) injection 4 mg, 4 mg,  Intravenous, Q6H PRN, Eun Fuentes MD    pantoprazole (PROTONIX) EC tablet 40 mg, 40 mg, Oral, Daily, Eun Fuentes MD, 40 mg at 12/16/24 1644    sodium chloride 0.9 % flush 10 mL, 10 mL, Intravenous, Q12H, Eun Fuentes MD, 10 mL at 12/16/24 2213    sodium chloride 0.9 % flush 10 mL, 10 mL, Intravenous, PRN, Eun Fuentes MD    sodium chloride 0.9 % infusion 40 mL, 40 mL, Intravenous, PRN, Eun Fuentes MD    traMADol (ULTRAM) tablet 25 mg, 25 mg, Oral, Q4H PRN, Harshil Augustin MD    Review of Systems     Constitution:  negative for chills, fatigue and fevers  Eyes:  negative for blurriness and change of vision  ENT:   negative for sore throat and voice change  Respiratory: negative for  cough and shortness of air  Cardiovascular:  Negative for chest pain or palpitations  Gastrointestinal:  negative for  See HPI  Genitourinary:  negative for  blood in urine and painful urination  Integument: negative for  rash and redness  Hematologic / Lymphatic: negative for  excessive bleeding and easy bruising  Musculoskeletal: negative for  joint pain and joint stiffness out of the ordinary  Neurological:  negative for  seizures and speech abnormality  Behavioral/Psych:  negative for  anxiety and depression out of the ordinary  Endocrine: negative for  diabetes and weight loss, unintended  Allergies / Immunologic:  negative for  anaphylaxis and rash        Objective     Vital Signs  Temp:  [98.3 °F (36.8 °C)-98.7 °F (37.1 °C)] 98.3 °F (36.8 °C)  Heart Rate:  [71-78] 72  Resp:  [16-18] 16  BP: (110-151)/(50-85) 151/85  Body mass index is 38.44 kg/m².  No intake or output data in the 24 hours ending 12/17/24 1452    No intake/output data recorded.       Physical Exam:   General: patient awake, alert and cooperative   Eyes: Normal lids and lashes, no scleral icterus   Neck: supple, normal ROM   Skin: warm and dry, not jaundiced   Cardiovascular: regular rhythm and rate, no murmurs auscultated   Pulm: clear to  auscultation bilaterally, regular and unlabored   Abdomen: soft, nontender, nondistended; normal bowel sounds   Rectal: deferred   Extremities: no rash or edema   Psychiatric: Normal mood and behavior; memory intact         Results Review:    I have reviewed all of the patients current test results  Results from last 7 days   Lab Units 12/17/24  0445 12/16/24  0657 12/15/24  0825   WBC 10*3/mm3 6.24 8.21 9.50   HEMOGLOBIN g/dL 12.2 14.3 13.4   HEMATOCRIT % 38.8 43.6 41.8   PLATELETS 10*3/mm3 202 252 232       Results from last 7 days   Lab Units 12/17/24  0237 12/16/24  0657 12/15/24  0825 12/14/24  2253   SODIUM mmol/L 140 139 138 136   POTASSIUM mmol/L 3.7 4.0 3.6 3.8   CHLORIDE mmol/L 104 106 101 100   CO2 mmol/L 25.0 24.0 25.0 23.0   BUN mg/dL 10 12 10 11   CREATININE mg/dL 0.70 0.85 0.55* 0.60   CALCIUM mg/dL 8.5* 9.0 9.2 9.2   BILIRUBIN mg/dL 0.5  --  0.6 0.5   ALK PHOS U/L 67  --  74 78   ALT (SGPT) U/L 21  --  28 31   AST (SGOT) U/L 18  --  19 23   GLUCOSE mg/dL 119* 103* 129* 144*             Lab Results   Lab Value Date/Time    LIPASE 25 12/14/2024 2253       Radiology:    Imaging Results (Last 24 Hours)       Procedure Component Value Units Date/Time    NM HIDA SCAN WITHOUT PHARMACOLOGICAL INTERVENTION [[id number]] Resulted: 12/17/24 1007     Updated: 12/17/24 1138              Assessment & Plan       Intractable pain    Heartburn    Belching    Irritable bowel syndrome with diarrhea    Intractable abdominal pain    Hypertension    Hyperlipidemia      Impression/Plan     Intractable pain     The cause of the patient's abdominal pain is unclear.    It has been severe enough to bring her to the emergency department and get her admitted.    We do not have any definite evidence of gallbladder disease.  Gastroscopy showed small hiatal hernia, moderate gastritis status post biopsy  Colonoscopy showed internal and external hemorrhoids, left-sided diverticulosis  Follow-up HIDA scan.      Electronically signed by  Espinoza Molina MD, 12/16/24, 11:15 AM CST.     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  Any results not available at time of inpatient discharge and/or GI clinic follow up should be managed by the hospitalist team, PCP, or outpatient gastroenterologist.    Espinoza Molina MD  12/17/24  14:52 CST

## 2024-12-18 PROBLEM — K80.10 CHRONIC CHOLECYSTITIS WITH CALCULUS: Status: ACTIVE | Noted: 2024-12-14

## 2024-12-18 PROBLEM — K81.1 CHRONIC CHOLECYSTITIS: Status: ACTIVE | Noted: 2024-12-14

## 2024-12-18 PROBLEM — K83.8 COMMON BILE DUCT DILATION: Status: ACTIVE | Noted: 2024-12-18

## 2024-12-18 LAB
ANION GAP SERPL CALCULATED.3IONS-SCNC: 11 MMOL/L (ref 5–15)
BUN SERPL-MCNC: 10 MG/DL (ref 8–23)
BUN/CREAT SERPL: 14.5 (ref 7–25)
CALCIUM SPEC-SCNC: 8.7 MG/DL (ref 8.6–10.5)
CHLORIDE SERPL-SCNC: 103 MMOL/L (ref 98–107)
CO2 SERPL-SCNC: 24 MMOL/L (ref 22–29)
CREAT SERPL-MCNC: 0.69 MG/DL (ref 0.57–1)
DEPRECATED RDW RBC AUTO: 42.6 FL (ref 37–54)
EGFRCR SERPLBLD CKD-EPI 2021: 93.5 ML/MIN/1.73
ERYTHROCYTE [DISTWIDTH] IN BLOOD BY AUTOMATED COUNT: 13.5 % (ref 12.3–15.4)
GLUCOSE BLDC GLUCOMTR-MCNC: 110 MG/DL (ref 70–130)
GLUCOSE BLDC GLUCOMTR-MCNC: 112 MG/DL (ref 70–130)
GLUCOSE BLDC GLUCOMTR-MCNC: 117 MG/DL (ref 70–130)
GLUCOSE BLDC GLUCOMTR-MCNC: 139 MG/DL (ref 70–130)
GLUCOSE BLDC GLUCOMTR-MCNC: 153 MG/DL (ref 70–130)
GLUCOSE SERPL-MCNC: 93 MG/DL (ref 65–99)
HCT VFR BLD AUTO: 41 % (ref 34–46.6)
HGB BLD-MCNC: 12.5 G/DL (ref 12–15.9)
MAGNESIUM SERPL-MCNC: 2.1 MG/DL (ref 1.6–2.4)
MCH RBC QN AUTO: 26.5 PG (ref 26.6–33)
MCHC RBC AUTO-ENTMCNC: 30.5 G/DL (ref 31.5–35.7)
MCV RBC AUTO: 86.9 FL (ref 79–97)
PLATELET # BLD AUTO: 214 10*3/MM3 (ref 140–450)
PMV BLD AUTO: 9.4 FL (ref 6–12)
POTASSIUM SERPL-SCNC: 3.3 MMOL/L (ref 3.5–5.2)
QT INTERVAL: 330 MS
QTC INTERVAL: 470 MS
RBC # BLD AUTO: 4.72 10*6/MM3 (ref 3.77–5.28)
SODIUM SERPL-SCNC: 138 MMOL/L (ref 136–145)
WBC NRBC COR # BLD AUTO: 7.16 10*3/MM3 (ref 3.4–10.8)

## 2024-12-18 PROCEDURE — S2900 ROBOTIC SURGICAL SYSTEM: HCPCS | Performed by: SURGERY

## 2024-12-18 PROCEDURE — 85027 COMPLETE CBC AUTOMATED: CPT | Performed by: FAMILY MEDICINE

## 2024-12-18 PROCEDURE — 99204 OFFICE O/P NEW MOD 45 MIN: CPT | Performed by: STUDENT IN AN ORGANIZED HEALTH CARE EDUCATION/TRAINING PROGRAM

## 2024-12-18 PROCEDURE — 25010000002 KETOROLAC TROMETHAMINE PER 15 MG: Performed by: FAMILY MEDICINE

## 2024-12-18 PROCEDURE — 83735 ASSAY OF MAGNESIUM: CPT | Performed by: FAMILY MEDICINE

## 2024-12-18 PROCEDURE — 99214 OFFICE O/P EST MOD 30 MIN: CPT | Performed by: INTERNAL MEDICINE

## 2024-12-18 PROCEDURE — 82948 REAGENT STRIP/BLOOD GLUCOSE: CPT

## 2024-12-18 PROCEDURE — 25010000002 KETOROLAC TROMETHAMINE PER 15 MG: Performed by: SURGERY

## 2024-12-18 PROCEDURE — G0378 HOSPITAL OBSERVATION PER HR: HCPCS

## 2024-12-18 PROCEDURE — 25010000002 PIPERACILLIN SOD-TAZOBACTAM PER 1 G: Performed by: FAMILY MEDICINE

## 2024-12-18 PROCEDURE — 99222 1ST HOSP IP/OBS MODERATE 55: CPT | Performed by: SURGERY

## 2024-12-18 PROCEDURE — 25010000002 ENOXAPARIN PER 10 MG: Performed by: SURGERY

## 2024-12-18 PROCEDURE — 93005 ELECTROCARDIOGRAM TRACING: CPT | Performed by: INTERNAL MEDICINE

## 2024-12-18 PROCEDURE — 80048 BASIC METABOLIC PNL TOTAL CA: CPT | Performed by: FAMILY MEDICINE

## 2024-12-18 RX ORDER — METOPROLOL TARTRATE 1 MG/ML
5 INJECTION, SOLUTION INTRAVENOUS ONCE
Status: COMPLETED | OUTPATIENT
Start: 2024-12-18 | End: 2024-12-18

## 2024-12-18 RX ORDER — METOPROLOL TARTRATE 1 MG/ML
5 INJECTION, SOLUTION INTRAVENOUS ONCE
Status: DISCONTINUED | OUTPATIENT
Start: 2024-12-18 | End: 2024-12-20 | Stop reason: HOSPADM

## 2024-12-18 RX ORDER — INDOCYANINE GREEN AND WATER 25 MG
7.5 KIT INJECTION ONCE
Status: DISCONTINUED | OUTPATIENT
Start: 2024-12-19 | End: 2024-12-20 | Stop reason: HOSPADM

## 2024-12-18 RX ORDER — FLECAINIDE ACETATE 50 MG/1
50 TABLET ORAL EVERY 12 HOURS SCHEDULED
Status: DISCONTINUED | OUTPATIENT
Start: 2024-12-18 | End: 2024-12-19

## 2024-12-18 RX ORDER — POTASSIUM CHLORIDE 750 MG/1
40 CAPSULE, EXTENDED RELEASE ORAL 2 TIMES DAILY
Status: COMPLETED | OUTPATIENT
Start: 2024-12-18 | End: 2024-12-18

## 2024-12-18 RX ADMIN — PANTOPRAZOLE SODIUM 40 MG: 40 TABLET, DELAYED RELEASE ORAL at 08:29

## 2024-12-18 RX ADMIN — METOPROLOL SUCCINATE 50 MG: 50 TABLET, FILM COATED, EXTENDED RELEASE ORAL at 08:29

## 2024-12-18 RX ADMIN — DICLOFENAC SODIUM 4 G: 10 GEL TOPICAL at 20:27

## 2024-12-18 RX ADMIN — Medication 10 ML: at 20:27

## 2024-12-18 RX ADMIN — KETOROLAC TROMETHAMINE 15 MG: 15 INJECTION, SOLUTION INTRAMUSCULAR; INTRAVENOUS at 22:36

## 2024-12-18 RX ADMIN — METOPROLOL TARTRATE 5 MG: 5 INJECTION INTRAVENOUS at 02:53

## 2024-12-18 RX ADMIN — PIPERACILLIN AND TAZOBACTAM 3.38 G: 3; .375 INJECTION, POWDER, FOR SOLUTION INTRAVENOUS at 06:47

## 2024-12-18 RX ADMIN — PIPERACILLIN AND TAZOBACTAM 3.38 G: 3; .375 INJECTION, POWDER, FOR SOLUTION INTRAVENOUS at 15:21

## 2024-12-18 RX ADMIN — FLECAINIDE ACETATE 50 MG: 50 TABLET ORAL at 21:09

## 2024-12-18 RX ADMIN — KETOROLAC TROMETHAMINE 15 MG: 15 INJECTION, SOLUTION INTRAMUSCULAR; INTRAVENOUS at 16:06

## 2024-12-18 RX ADMIN — POTASSIUM CHLORIDE 40 MEQ: 750 CAPSULE, EXTENDED RELEASE ORAL at 20:26

## 2024-12-18 RX ADMIN — ENOXAPARIN SODIUM 40 MG: 100 INJECTION SUBCUTANEOUS at 12:10

## 2024-12-18 RX ADMIN — POTASSIUM CHLORIDE 40 MEQ: 750 CAPSULE, EXTENDED RELEASE ORAL at 08:29

## 2024-12-18 RX ADMIN — PIPERACILLIN AND TAZOBACTAM 3.38 G: 3; .375 INJECTION, POWDER, FOR SOLUTION INTRAVENOUS at 22:35

## 2024-12-18 NOTE — PLAN OF CARE
Goal Outcome Evaluation:  Plan of Care Reviewed With: patient      Patient c/o abdominal pain, PRN pain med given. No c/o nausea. Patient A&O x4. Patient went A-fib RVR overnight, EKG obtained and provider notified, see MAR. IV antibiotics given.

## 2024-12-18 NOTE — CONSULTS
GENERAL SURGERY CONSULTATION NOTE    Patient Name:  Sada Casey  YOB: 1955  MRN: [id number]    Patient Care Team:  Jennifer Hummel DO as PCP - General (Family Medicine)  Shawnee Marin MD as Consulting Physician (Gastroenterology)    Date of Consultation: 12/18/24    Consulting Physician: Harshil Augustin MD    Reason for Consult: Chronic cholecystitis    History of Present Illness  Sada Casey is a 69 y.o. female that I am seeing in consultation for gallbladder disease.  The patient was admitted to the hospital 4 days ago for right sided abdominal pain.  CT of the abdomen pelvis showed a stone in the neck of the gallbladder.  Ultrasound was unremarkable.  She was admitted for further workup and supportive care.  Gastroenterology was consulted and she underwent an upper endoscopy that showed a small hiatal hernia with some erythematous mucosa in the gastric body but was biopsied.  She had some sigmoid diverticulosis and nonbleeding external and internal hemorrhoid on colonoscopy.  A HIDA scan was subsequently performed that showed no filling of the gallbladder concerning for acute versus chronic cholecystitis.  The patient has never had gallbladder disease in the past.    Allergies   Allergies   Allergen Reactions    Nyquil Multi-Symptom [Pseudoeph-Doxylamine-Dm-Apap] Rash       Medications  Diclofenac Sodium, 4 g, Topical, 4x Daily  enoxaparin, 40 mg, Subcutaneous, Daily  hydrocortisone, 25 mg, Rectal, Daily  insulin lispro, 2-7 Units, Subcutaneous, 4x Daily AC & at Bedtime  Lidocaine, 2 patch, Transdermal, Q24H  metoprolol succinate XL, 50 mg, Oral, Daily  metoprolol tartrate, 5 mg, Intravenous, Once  pantoprazole, 40 mg, Oral, Daily  piperacillin-tazobactam, 3.375 g, Intravenous, Q8H  potassium chloride, 40 mEq, Oral, BID  sodium chloride, 10 mL, Intravenous, Q12H         No current facility-administered medications on file prior to encounter.     Current Outpatient  Medications on File Prior to Encounter   Medication Sig    celecoxib (CeleBREX) 200 MG capsule Take 1 capsule by mouth 2 (Two) Times a Day.    Cholecalciferol 25 MCG (1000 UT) tablet Take 1 tablet by mouth Daily.    citalopram (CeleXA) 20 MG tablet Take 1 tablet by mouth Daily.    dicyclomine (BENTYL) 10 MG capsule Take 1 capsule by mouth 4 (Four) Times a Day Before Meals & at Bedtime.    ezetimibe (ZETIA) 10 MG tablet Take 1 tablet by mouth Daily.    hydrochlorothiazide (MICROZIDE) 12.5 MG capsule Take 1 capsule by mouth Daily.    irbesartan (AVAPRO) 300 MG tablet Take 1 tablet by mouth Daily.    loperamide (Imodium A-D) 2 MG tablet Take 1 tablet by mouth 4 (Four) Times a Day As Needed for Diarrhea.    metoprolol succinate XL (TOPROL-XL) 100 MG 24 hr tablet Take 1 tablet by mouth 2 (Two) Times a Day.    pantoprazole (PROTONIX) 40 MG EC tablet Take 1 tablet by mouth Daily.    fluticasone (FLONASE) 50 MCG/ACT nasal spray Administer 2 sprays into the nostril(s) as directed by provider As Needed.       History  Past Medical History:   Diagnosis Date    Anxiety     Arthritis     Diverticulosis     Eczema     GERD (gastroesophageal reflux disease)     History of DVT (deep vein thrombosis)     left leg, about 6 yrs    Hyperlipidemia     Hypertension     Insomnia     Right leg numbness     Sleep apnea     cpap    Stress incontinence     TIA (transient ischemic attack)    ,   Past Surgical History:   Procedure Laterality Date    BREAST LUMPECTOMY Left     CATARACT EXTRACTION WITH INTRAOCULAR LENS IMPLANT Bilateral     COLONOSCOPY  12/15/2011    Diverticulosis; Repeat 7 years    COLONOSCOPY N/A 2/1/2019    Diverticulosis in the left colon; The examination was otherwise normal on direct and retroflexion views; No specimens collected; Repeat 10 years    COLONOSCOPY N/A 5/6/2021    The examined portion of the ileum was normal; Diverticulosis in the left colon; The examination was otherwise normal on direct and retroflexion  views; Biopsies were taken with a cold forceps from the entire colon for evaluation of microscopic colitis; Repeat 10 years    COLONOSCOPY N/A 12/16/2024    Procedure: COLONOSCOPY WITH ANESTHESIA;  Surgeon: Espinoza Molina MD;  Location: [facility] ENDOSCOPY;  Service: Gastroenterology;  Laterality: N/A;  PRE OP: Irritable bowel syndrome with diarrhea  post op: diverticulosis  pcp:Jennifer Hummel, DO    ENDOSCOPY  12/15/2011    [facility]    ENDOSCOPY N/A 11/18/2019    Normal esophagus; Normal stomach; Normal examined duodenum-biopsied    ENDOSCOPY N/A 12/16/2024    Procedure: ESOPHAGOGASTRODUODENOSCOPY WITH ANESTHESIA;  Surgeon: Espinoza Molina MD;  Location: [facility] ENDOSCOPY;  Service: Gastroenterology;  Laterality: N/A;  pre op: GERD  post op:   pcp:Jennifer Hummel, DO    HYSTERECTOMY      STEROID INJECTION Bilateral 8/21/2018    Procedure: 2, 3 TARSOMETATARSAL JOINT INJECTION WITH FLUROSCOPIC GUIDANCE - BILATERAL FEET;  Surgeon: Senthil Curtis DPM;  Location: [facility] OR;  Service: Podiatry    STEROID INJECTION Bilateral 1/15/2019    Procedure: 2, 3, TARSOMETATARSAL JOINT INJECTION WITH FLUROSCOPIC GUIDANCE- BILATERAL FEET;  Surgeon: Senthil Curtis DPM;  Location: [facility] OR;  Service: Podiatry     Family History   Problem Relation Age of Onset    No Known Problems Father     Coronary artery disease Mother     No Known Problems Sister     No Known Problems Sister     No Known Problems Sister     No Known Problems Sister     Colon cancer Neg Hx     Colon polyps Neg Hx     Esophageal cancer Neg Hx     Liver cancer Neg Hx     Liver disease Neg Hx     Rectal cancer Neg Hx     Stomach cancer Neg Hx      Social History     Tobacco Use    Smoking status: Never    Smokeless tobacco: Never   Vaping Use    Vaping status: Never Used   Substance Use Topics    Alcohol use: Yes     Comment: Occasionally    Drug use: No   Pt [facility]     Current Facility-Administered Medications:     dextrose (D50W)  (25 g/50 mL) IV injection 25 g, 25 g, Intravenous, Q15 Min PRN, Enu Fuentes MD    dextrose (GLUTOSE) oral gel 15 g, 15 g, Oral, Q15 Min PRN, Eun Fuentes MD    Diclofenac Sodium (VOLTAREN) 1 % gel 4 g, 4 g, Topical, 4x Daily, Harshil Augustin MD    Enoxaparin Sodium (LOVENOX) syringe 40 mg, 40 mg, Subcutaneous, Daily, Rao Iglesias MD, 40 mg at 24 175    glucagon (GLUCAGEN) injection 1 mg, 1 mg, Intramuscular, Q15 Min PRN, Enu Fuentes MD    hydrocortisone (ANUSOL-HC) suppository 25 mg, 25 mg, Rectal, Daily, Harshil Augustin MD    Insulin Lispro (humaLOG) injection 2-7 Units, 2-7 Units, Subcutaneous, 4x Daily AC & at Bedtime, Eun Fuentes MD    ketorolac (TORADOL) injection 15 mg, 15 mg, Intravenous, Q6H PRN, Harshil Augustin MD, 15 mg at 24 175    Lidocaine 4 % 2 patch, 2 patch, Transdermal, Q24H, Harshil Augustin MD    metoprolol succinate XL (TOPROL-XL) 24 hr tablet 50 mg, 50 mg, Oral, Daily, Harshil Augustin MD, 50 mg at 24 0829    metoprolol tartrate (LOPRESSOR) injection 5 mg, 5 mg, Intravenous, Once, Henry Raphael MD    Morphine sulfate (PF) injection 2 mg, 2 mg, Intravenous, Q4H PRN, Henry Raphael MD, 2 mg at 24 2146    [] HYDROmorphone (DILAUDID) injection 0.5 mg, 0.5 mg, Intravenous, Q4H PRN, 0.5 mg at 12/15/24 2004 **AND** naloxone (NARCAN) injection 0.4 mg, 0.4 mg, Intravenous, Q5 Min PRN, Eun Fuentes MD    ondansetron ODT (ZOFRAN-ODT) disintegrating tablet 4 mg, 4 mg, Oral, Q6H PRN **OR** ondansetron (ZOFRAN) injection 4 mg, 4 mg, Intravenous, Q6H PRN, Eun Fuentes MD    pantoprazole (PROTONIX) EC tablet 40 mg, 40 mg, Oral, Daily, Eun Fuentes MD, 40 mg at 24 0829    piperacillin-tazobactam (ZOSYN) 3.375 g IVPB in 100 mL NS MBP (CD), 3.375 g, Intravenous, Q8H, Harshil Augustin MD, Last Rate: 0 mL/hr at 24 0250, 3.375 g at 24 0647    potassium chloride (MICRO-K/KLOR-CON) CR capsule, 40 mEq, Oral, BID, Harshil Augustin MD, 40 mEq  "at 12/18/24 0829    sodium chloride 0.9 % flush 10 mL, 10 mL, Intravenous, Q12H, Eun Fuentes MD, 10 mL at 12/17/24 2102    sodium chloride 0.9 % flush 10 mL, 10 mL, Intravenous, PRN, Eun Fuentes MD    sodium chloride 0.9 % infusion 40 mL, 40 mL, Intravenous, PRN, Eun Fuentes MD    traMADol (ULTRAM) tablet 25 mg, 25 mg, Oral, Q4H PRN, Harshil Augsutin MD    Review of Systems  A comprehensive 14 point review of systems was performed and is negative unless otherwise noted.     Vital Signs  /69 (BP Location: Right arm, Patient Position: Lying)   Pulse 73   Temp 98.3 °F (36.8 °C) (Oral)   Resp 16   Ht 160 cm (63\")   Wt 98.4 kg (217 lb)   SpO2 93%   BMI 38.44 kg/m²   Body mass index is 38.44 kg/m².     Intake/Output Summary (Last 24 hours) at 12/18/2024 1208  Last data filed at 12/17/2024 1831  Gross per 24 hour   Intake 360 ml   Output --   Net 360 ml       Physical Exam  Constitutional:       Appearance: Normal appearance. She is normal weight.      Comments: Pleasant elderly female, in no acute distress. Normal development, obese body habitus. Well nourished   HENT:      Head: Normocephalic and atraumatic.      Right Ear: External ear normal.      Left Ear: External ear normal.      Ears:      Comments: Hearing intact     Nose: Nose normal.      Comments: Nares patent, no septal deviation, no drainage     Mouth/Throat:      Comments: Airway patent, dentition intact, mucus membranes moist  Eyes:      Extraocular Movements: Extraocular movements intact.      Conjunctiva/sclera: Conjunctivae normal.      Pupils: Pupils are equal, round, and reactive to light.      Comments: External eyelids grossly normal, vision intact, no scleral icterus   Neck:      Comments: Trachea midline  Cardiovascular:      Rate and Rhythm: Normal rate.      Comments: Normotensive, no JVD bilaterally  Pulmonary:      Effort: Pulmonary effort is normal.      Breath sounds: Normal breath sounds.      Comments: Normal " respiratory rate  Abdominal:      Comments: Obese, soft   Musculoskeletal:         General: Normal range of motion.      Cervical back: Normal range of motion.   Skin:     General: Skin is warm and dry.      Comments: Skin color is consistent with ethnicity   Neurological:      General: No focal deficit present.      Mental Status: She is alert and oriented to person, place, and time.   Psychiatric:         Mood and Affect: Mood normal.         Behavior: Behavior normal.         Thought Content: Thought content normal.         Judgment: Judgment normal.      Comments: Patient understands disease process and has decision making capacity.          Results:  Labs:  I personally reviewed all pertinent labs.   Imaging: I personally reviewed all pertinent imaging studies.     ASSESSMENT AND PLAN    Active Hospital Problems    Diagnosis     **Intractable pain     Hypertension     Hyperlipidemia     Intractable abdominal pain     Chronic cholecystitis     Irritable bowel syndrome with diarrhea     Heartburn     Belching        Sada Casey is a 69 y.o. female with common bile duct dilation and what is likely acute on chronic calculus cholecystitis.  She would like to proceed with surgery during this admission.  N.p.o. after midnight.  Unless there is a reason to stay on Zosyn, okay to convert to Rocephin.    Plan for robotic laparoscopic cholecystectomy with intraoperative cholangiogram tomorrow.    I discussed the risks, benefits and alternatives to cholecystectomy including risk of injury to surrounding structures specifically the bile duct, postoperative bile leaks, deep organ space and superficial skin infection, uncontrolled bleeding, the need for blood transfusion, conversion to open surgery, incisional hernias, ongoing pain, bowel habit changes, long term drain and wound care, and the possibility of requiring further operations or procedures including endoscopy. Additionally, I counseled the patient on the risk of  postoperative cardiopulmonary complications. I gave the patient a chance to ask any questions and answered them thoroughly. The patient understood the risks and was agreeable to proceeding to surgery. Consent was obtained from the patient.    I discussed the patient's findings and my recommendations with the patient and/or family, as well as the nursing staff and primary care team.    Rao Iglesias MD, [facility]  General Surgery  12/18/2024  12:08 CST    Please note that portions of this note were completed with a voice recognition program.

## 2024-12-18 NOTE — H&P (VIEW-ONLY)
GENERAL SURGERY CONSULTATION NOTE    Patient Name:  Sada Casey  YOB: 1955  MRN: [id number]    Patient Care Team:  Jennifer Hummel DO as PCP - General (Family Medicine)  Shawnee Marin MD as Consulting Physician (Gastroenterology)    Date of Consultation: 12/18/24    Consulting Physician: Harshil Augustin MD    Reason for Consult: Chronic cholecystitis    History of Present Illness  Sada Casey is a 69 y.o. female that I am seeing in consultation for gallbladder disease.  The patient was admitted to the hospital 4 days ago for right sided abdominal pain.  CT of the abdomen pelvis showed a stone in the neck of the gallbladder.  Ultrasound was unremarkable.  She was admitted for further workup and supportive care.  Gastroenterology was consulted and she underwent an upper endoscopy that showed a small hiatal hernia with some erythematous mucosa in the gastric body but was biopsied.  She had some sigmoid diverticulosis and nonbleeding external and internal hemorrhoid on colonoscopy.  A HIDA scan was subsequently performed that showed no filling of the gallbladder concerning for acute versus chronic cholecystitis.  The patient has never had gallbladder disease in the past.    Allergies   Allergies   Allergen Reactions    Nyquil Multi-Symptom [Pseudoeph-Doxylamine-Dm-Apap] Rash       Medications  Diclofenac Sodium, 4 g, Topical, 4x Daily  enoxaparin, 40 mg, Subcutaneous, Daily  hydrocortisone, 25 mg, Rectal, Daily  insulin lispro, 2-7 Units, Subcutaneous, 4x Daily AC & at Bedtime  Lidocaine, 2 patch, Transdermal, Q24H  metoprolol succinate XL, 50 mg, Oral, Daily  metoprolol tartrate, 5 mg, Intravenous, Once  pantoprazole, 40 mg, Oral, Daily  piperacillin-tazobactam, 3.375 g, Intravenous, Q8H  potassium chloride, 40 mEq, Oral, BID  sodium chloride, 10 mL, Intravenous, Q12H         No current facility-administered medications on file prior to encounter.     Current Outpatient  Medications on File Prior to Encounter   Medication Sig    celecoxib (CeleBREX) 200 MG capsule Take 1 capsule by mouth 2 (Two) Times a Day.    Cholecalciferol 25 MCG (1000 UT) tablet Take 1 tablet by mouth Daily.    citalopram (CeleXA) 20 MG tablet Take 1 tablet by mouth Daily.    dicyclomine (BENTYL) 10 MG capsule Take 1 capsule by mouth 4 (Four) Times a Day Before Meals & at Bedtime.    ezetimibe (ZETIA) 10 MG tablet Take 1 tablet by mouth Daily.    hydrochlorothiazide (MICROZIDE) 12.5 MG capsule Take 1 capsule by mouth Daily.    irbesartan (AVAPRO) 300 MG tablet Take 1 tablet by mouth Daily.    loperamide (Imodium A-D) 2 MG tablet Take 1 tablet by mouth 4 (Four) Times a Day As Needed for Diarrhea.    metoprolol succinate XL (TOPROL-XL) 100 MG 24 hr tablet Take 1 tablet by mouth 2 (Two) Times a Day.    pantoprazole (PROTONIX) 40 MG EC tablet Take 1 tablet by mouth Daily.    fluticasone (FLONASE) 50 MCG/ACT nasal spray Administer 2 sprays into the nostril(s) as directed by provider As Needed.       History  Past Medical History:   Diagnosis Date    Anxiety     Arthritis     Diverticulosis     Eczema     GERD (gastroesophageal reflux disease)     History of DVT (deep vein thrombosis)     left leg, about 6 yrs    Hyperlipidemia     Hypertension     Insomnia     Right leg numbness     Sleep apnea     cpap    Stress incontinence     TIA (transient ischemic attack)    ,   Past Surgical History:   Procedure Laterality Date    BREAST LUMPECTOMY Left     CATARACT EXTRACTION WITH INTRAOCULAR LENS IMPLANT Bilateral     COLONOSCOPY  12/15/2011    Diverticulosis; Repeat 7 years    COLONOSCOPY N/A 2/1/2019    Diverticulosis in the left colon; The examination was otherwise normal on direct and retroflexion views; No specimens collected; Repeat 10 years    COLONOSCOPY N/A 5/6/2021    The examined portion of the ileum was normal; Diverticulosis in the left colon; The examination was otherwise normal on direct and retroflexion  views; Biopsies were taken with a cold forceps from the entire colon for evaluation of microscopic colitis; Repeat 10 years    COLONOSCOPY N/A 12/16/2024    Procedure: COLONOSCOPY WITH ANESTHESIA;  Surgeon: Espinoza Molina MD;  Location: [facility] ENDOSCOPY;  Service: Gastroenterology;  Laterality: N/A;  PRE OP: Irritable bowel syndrome with diarrhea  post op: diverticulosis  pcp:Jennifer Hummel, DO    ENDOSCOPY  12/15/2011    [facility]    ENDOSCOPY N/A 11/18/2019    Normal esophagus; Normal stomach; Normal examined duodenum-biopsied    ENDOSCOPY N/A 12/16/2024    Procedure: ESOPHAGOGASTRODUODENOSCOPY WITH ANESTHESIA;  Surgeon: Espinoza Molina MD;  Location: [facility] ENDOSCOPY;  Service: Gastroenterology;  Laterality: N/A;  pre op: GERD  post op:   pcp:Jennifer Hummel, DO    HYSTERECTOMY      STEROID INJECTION Bilateral 8/21/2018    Procedure: 2, 3 TARSOMETATARSAL JOINT INJECTION WITH FLUROSCOPIC GUIDANCE - BILATERAL FEET;  Surgeon: Senthil Curtis DPM;  Location: [facility] OR;  Service: Podiatry    STEROID INJECTION Bilateral 1/15/2019    Procedure: 2, 3, TARSOMETATARSAL JOINT INJECTION WITH FLUROSCOPIC GUIDANCE- BILATERAL FEET;  Surgeon: Senthil Curtis DPM;  Location: [facility] OR;  Service: Podiatry     Family History   Problem Relation Age of Onset    No Known Problems Father     Coronary artery disease Mother     No Known Problems Sister     No Known Problems Sister     No Known Problems Sister     No Known Problems Sister     Colon cancer Neg Hx     Colon polyps Neg Hx     Esophageal cancer Neg Hx     Liver cancer Neg Hx     Liver disease Neg Hx     Rectal cancer Neg Hx     Stomach cancer Neg Hx      Social History     Tobacco Use    Smoking status: Never    Smokeless tobacco: Never   Vaping Use    Vaping status: Never Used   Substance Use Topics    Alcohol use: Yes     Comment: Occasionally    Drug use: No   Pt lives in [facility]     Current Facility-Administered Medications:     dextrose (D50W)  (25 g/50 mL) IV injection 25 g, 25 g, Intravenous, Q15 Min PRN, Eun Fuentes MD    dextrose (GLUTOSE) oral gel 15 g, 15 g, Oral, Q15 Min PRN, Eun Fuentes MD    Diclofenac Sodium (VOLTAREN) 1 % gel 4 g, 4 g, Topical, 4x Daily, Harshil Augustin MD    Enoxaparin Sodium (LOVENOX) syringe 40 mg, 40 mg, Subcutaneous, Daily, Rao Iglesias MD, 40 mg at 24 175    glucagon (GLUCAGEN) injection 1 mg, 1 mg, Intramuscular, Q15 Min PRN, Eun Fuentes MD    hydrocortisone (ANUSOL-HC) suppository 25 mg, 25 mg, Rectal, Daily, Harshil Augustin MD    Insulin Lispro (humaLOG) injection 2-7 Units, 2-7 Units, Subcutaneous, 4x Daily AC & at Bedtime, Eun Fuentes MD    ketorolac (TORADOL) injection 15 mg, 15 mg, Intravenous, Q6H PRN, Harshil Augustin MD, 15 mg at 24 175    Lidocaine 4 % 2 patch, 2 patch, Transdermal, Q24H, Harshil Augustin MD    metoprolol succinate XL (TOPROL-XL) 24 hr tablet 50 mg, 50 mg, Oral, Daily, Harshil Augustin MD, 50 mg at 24 0829    metoprolol tartrate (LOPRESSOR) injection 5 mg, 5 mg, Intravenous, Once, Henry Raphael MD    Morphine sulfate (PF) injection 2 mg, 2 mg, Intravenous, Q4H PRN, Henry Raphael MD, 2 mg at 24 2146    [] HYDROmorphone (DILAUDID) injection 0.5 mg, 0.5 mg, Intravenous, Q4H PRN, 0.5 mg at 12/15/24 2004 **AND** naloxone (NARCAN) injection 0.4 mg, 0.4 mg, Intravenous, Q5 Min PRN, Eun Fuentes MD    ondansetron ODT (ZOFRAN-ODT) disintegrating tablet 4 mg, 4 mg, Oral, Q6H PRN **OR** ondansetron (ZOFRAN) injection 4 mg, 4 mg, Intravenous, Q6H PRN, Eun Fuentes MD    pantoprazole (PROTONIX) EC tablet 40 mg, 40 mg, Oral, Daily, Eun Fuentes MD, 40 mg at 24 0829    piperacillin-tazobactam (ZOSYN) 3.375 g IVPB in 100 mL NS MBP (CD), 3.375 g, Intravenous, Q8H, Harshil Augustin MD, Last Rate: 0 mL/hr at 24 0250, 3.375 g at 24 0647    potassium chloride (MICRO-K/KLOR-CON) CR capsule, 40 mEq, Oral, BID, Harshil Augustin MD, 40 mEq  "at 12/18/24 0829    sodium chloride 0.9 % flush 10 mL, 10 mL, Intravenous, Q12H, Eun Fuentes MD, 10 mL at 12/17/24 2102    sodium chloride 0.9 % flush 10 mL, 10 mL, Intravenous, PRN, Eun Fuentes MD    sodium chloride 0.9 % infusion 40 mL, 40 mL, Intravenous, PRN, Eun Fuentes MD    traMADol (ULTRAM) tablet 25 mg, 25 mg, Oral, Q4H PRN, Harshil Augustin MD    Review of Systems  A comprehensive 14 point review of systems was performed and is negative unless otherwise noted.     Vital Signs  /69 (BP Location: Right arm, Patient Position: Lying)   Pulse 73   Temp 98.3 °F (36.8 °C) (Oral)   Resp 16   Ht 160 cm (63\")   Wt 98.4 kg (217 lb)   SpO2 93%   BMI 38.44 kg/m²   Body mass index is 38.44 kg/m².     Intake/Output Summary (Last 24 hours) at 12/18/2024 1208  Last data filed at 12/17/2024 1831  Gross per 24 hour   Intake 360 ml   Output --   Net 360 ml       Physical Exam  Constitutional:       Appearance: Normal appearance. She is normal weight.      Comments: Pleasant elderly female, in no acute distress. Normal development, obese body habitus. Well nourished   HENT:      Head: Normocephalic and atraumatic.      Right Ear: External ear normal.      Left Ear: External ear normal.      Ears:      Comments: Hearing intact     Nose: Nose normal.      Comments: Nares patent, no septal deviation, no drainage     Mouth/Throat:      Comments: Airway patent, dentition intact, mucus membranes moist  Eyes:      Extraocular Movements: Extraocular movements intact.      Conjunctiva/sclera: Conjunctivae normal.      Pupils: Pupils are equal, round, and reactive to light.      Comments: External eyelids grossly normal, vision intact, no scleral icterus   Neck:      Comments: Trachea midline  Cardiovascular:      Rate and Rhythm: Normal rate.      Comments: Normotensive, no JVD bilaterally  Pulmonary:      Effort: Pulmonary effort is normal.      Breath sounds: Normal breath sounds.      Comments: Normal " respiratory rate  Abdominal:      Comments: Obese, soft   Musculoskeletal:         General: Normal range of motion.      Cervical back: Normal range of motion.   Skin:     General: Skin is warm and dry.      Comments: Skin color is consistent with ethnicity   Neurological:      General: No focal deficit present.      Mental Status: She is alert and oriented to person, place, and time.   Psychiatric:         Mood and Affect: Mood normal.         Behavior: Behavior normal.         Thought Content: Thought content normal.         Judgment: Judgment normal.      Comments: Patient understands disease process and has decision making capacity.          Results:  Labs:  I personally reviewed all pertinent labs.   Imaging: I personally reviewed all pertinent imaging studies.     ASSESSMENT AND PLAN    Active Hospital Problems    Diagnosis     **Intractable pain     Hypertension     Hyperlipidemia     Intractable abdominal pain     Chronic cholecystitis     Irritable bowel syndrome with diarrhea     Heartburn     Belching        Sada Casey is a 69 y.o. female with common bile duct dilation and what is likely acute on chronic calculus cholecystitis.  She would like to proceed with surgery during this admission.  N.p.o. after midnight.  Unless there is a reason to stay on Zosyn, okay to convert to Rocephin.    Plan for robotic laparoscopic cholecystectomy with intraoperative cholangiogram tomorrow.    I discussed the risks, benefits and alternatives to cholecystectomy including risk of injury to surrounding structures specifically the bile duct, postoperative bile leaks, deep organ space and superficial skin infection, uncontrolled bleeding, the need for blood transfusion, conversion to open surgery, incisional hernias, ongoing pain, bowel habit changes, long term drain and wound care, and the possibility of requiring further operations or procedures including endoscopy. Additionally, I counseled the patient on the risk of  postoperative cardiopulmonary complications. I gave the patient a chance to ask any questions and answered them thoroughly. The patient understood the risks and was agreeable to proceeding to surgery. Consent was obtained from the patient.    I discussed the patient's findings and my recommendations with the patient and/or family, as well as the nursing staff and primary care team.    Rao Iglesias MD, [facility]  General Surgery  12/18/2024  12:08 CST    Please note that portions of this note were completed with a voice recognition program.

## 2024-12-18 NOTE — PLAN OF CARE
Problem: Adult Inpatient Plan of Care  Goal: Plan of Care Review  Outcome: Progressing  Flowsheets (Taken 12/18/2024 5504)  Outcome Evaluation: Patient RA. IV CDI. IV abx infusing per order. RA. C/O pain, see MAR. Reg diet, NPO at midnight for procedure tomorrow. Accuchecks. VSS, safety maintained.

## 2024-12-18 NOTE — PROGRESS NOTES
[facility] Medicine Services  INPATIENT PROGRESS NOTE    Patient Name: Sada Casey  Date of Admission: 12/14/2024  Today's Date: 12/18/24  Length of Stay: 0  Primary Care Physician: Jennifer Hummel DO    Subjective   Chief Complaint: Cholecystitis.  [facility]   Plan for surgery tomorrow by general surgery.  Zosyn antibiotics already started yesterday.   Potassium is low, p.o. potassium, magnesium is normal.  White blood cells normal.  Hemoglobin is normal.  Patient is on room air.    Review of Systems   Constitutional:  Positive for activity change, appetite change and fatigue. Negative for chills and fever.   HENT:  Negative for hearing loss, nosebleeds, tinnitus and trouble swallowing.    Eyes:  Negative for visual disturbance.   Respiratory:  Negative for cough, chest tightness, shortness of breath and wheezing.    Cardiovascular:  Negative for chest pain, palpitations and leg swelling.   Gastrointestinal:  Negative for abdominal distention, abdominal pain, blood in stool, constipation, diarrhea, nausea and vomiting.   Endocrine: Negative for cold intolerance, heat intolerance, polydipsia, polyphagia and polyuria.   Genitourinary:  Negative for decreased urine volume, difficulty urinating, dysuria, flank pain, frequency and hematuria.   Musculoskeletal:  Negative for arthralgias, joint swelling and myalgias.   Skin:  Negative for rash.   Allergic/Immunologic: Negative for immunocompromised state.   Neurological:  Positive for weakness. Negative for dizziness, syncope, light-headedness and headaches.   Hematological:  Negative for adenopathy. Does not bruise/bleed easily.   Psychiatric/Behavioral:  Negative for confusion and sleep disturbance. The patient is not nervous/anxious.  All pertinent negatives and positives are as above. All other systems have been reviewed and are negative unless otherwise stated.     Objective    Temp:  [98.3 °F (36.8 °C)-99 °F (37.2 °C)] 98.3  °F (36.8 °C)  Heart Rate:  [] 73  Resp:  [16] 16  BP: (128-152)/(63-95) 128/69  Physical Exam  Vitals and nursing note reviewed.   Constitutional:       General: She is not in acute distress.     Appearance: She is not toxic-appearing.   HENT:      Head: Normocephalic and atraumatic.      Mouth/Throat:      Mouth: Mucous membranes are moist.      Pharynx: Oropharynx is clear.   Eyes:      Extraocular Movements: Extraocular movements intact.      Conjunctiva/sclera: Conjunctivae normal.      Pupils: Pupils are equal, round, and reactive to light.   Cardiovascular:      Rate and Rhythm: Normal rate and regular rhythm.      Pulses: Normal pulses.      Heart sounds: No murmur heard.  Pulmonary:      Effort: Pulmonary effort is normal. No respiratory distress.      Breath sounds: Normal breath sounds. No wheezing or rhonchi.   Abdominal:      General: Bowel sounds are normal. There is no distension.      Palpations: Abdomen is soft.      Tenderness: There is no abdominal tenderness.      Comments: Obesity.   Musculoskeletal:         General: No swelling or tenderness. Normal range of motion.      Cervical back: Normal range of motion and neck supple. No muscular tenderness.   Skin:     General: Skin is warm and dry.      Capillary Refill: Capillary refill takes 2 to 3 seconds.      Findings: No erythema or rash.   Neurological:      General: No focal deficit present.      Mental Status: She is alert and oriented to person, place, and time.      Cranial Nerves: No cranial nerve deficit.      Motor: No weakness.   Psychiatric:         Mood and Affect: Mood normal.         Behavior: Behavior normal.          Results Review:  I have reviewed the labs, radiology results, and diagnostic studies.    Laboratory Data:   Results from last 7 days   Lab Units 12/18/24  0128 12/17/24  0445 12/16/24  0657   WBC 10*3/mm3 7.16 6.24 8.21   HEMOGLOBIN g/dL 12.5 12.2 14.3   HEMATOCRIT % 41.0 38.8 43.6   PLATELETS 10*3/mm3 214 202 252  "       Results from last 7 days   Lab Units 12/18/24  0128 12/17/24  0237 12/16/24  0657 12/15/24  0825 12/14/24  2253   SODIUM mmol/L 138 140 139 138 136   POTASSIUM mmol/L 3.3* 3.7 4.0 3.6 3.8   CHLORIDE mmol/L 103 104 106 101 100   CO2 mmol/L 24.0 25.0 24.0 25.0 23.0   BUN mg/dL 10 10 12 10 11   CREATININE mg/dL 0.69 0.70 0.85 0.55* 0.60   CALCIUM mg/dL 8.7 8.5* 9.0 9.2 9.2   BILIRUBIN mg/dL  --  0.5  --  0.6 0.5   ALK PHOS U/L  --  67  --  74 78   ALT (SGPT) U/L  --  21  --  28 31   AST (SGOT) U/L  --  18  --  19 23   GLUCOSE mg/dL 93 119* 103* 129* 144*       Culture Data:   No results found for: \"BLOODCX\", \"URINECX\", \"WOUNDCX\", \"MRSACX\", \"RESPCX\", \"STOOLCX\"    Radiology Data:   Imaging Results (Last 24 Hours)       Procedure Component Value Units Date/Time    NM HIDA SCAN WITHOUT PHARMACOLOGICAL INTERVENTION [[id number]] Collected: 12/17/24 1205     Updated: 12/17/24 1524    Narrative:      EXAM: NM HIDA SCAN WITHOUT PHARMACOLOGICAL INTERVENTION- - 12/17/2024  9:06 AM     HISTORY: abd pain; R10.9-Unspecified abdominal pain       COMPARISON: 12/15/2024     TECHNIQUE:    5.0 mCi of Tc-99m Mebrofenin was administered intravenously and serial  anterior planar images over the liver were obtained. 30 mL corn oil  emulsion was administered orally with subsequent imaging per protocol.     FINDINGS:   Homogenous activity throughout the liver with good clearance of  background activity. This indicates good hepatocellular function.      Biliary tree activity is seen at 15 minutes. The gallbladder is not  visualized on 4-hour delay image. Bowel activity is seen at 25 minutes.          Impression:      1. Nonvisualization of the gallbladder at 4 hours, concerning for acute  cholecystitis.     Result communicated by telephone Jolly Stroud patient's nurse at 3:19 PM  on 12/17/2024.      This report was signed and finalized on 12/17/2024 3:21 PM by Dr Crystal Gilbert MD.               I have reviewed the patient's current " medications.     Assessment/Plan   Assessment  Active Hospital Problems    Diagnosis     **Intractable pain     Common bile duct dilation     Hypertension     Hyperlipidemia     Intractable abdominal pain     Chronic cholecystitis with calculus     Irritable bowel syndrome with diarrhea     Heartburn     Belching        HPI . 69-year-old female patient with a medical history of diabetes mellitus type 2 and hypertension, presenting to the ED with intractable epigastric pain.  She has been having this abdominal pain for the last 3 days, and was associated with nausea and nonbloody vomiting.  She feels bloated, but is having normal bowel movements.  She denies any fever or chills.  She denies any chest pain shortness of breath.        Treatment Plan  Abdominal pain/common bile duct dilatation/cholecystitis.  Start Zosyn antibiotics.  General surgery consult.  CT abdomen pelvic- concern for a solitary radiodense gallstone in the region of the gallbladder neck by CT- not visible on the subsequent ultrasound- gallbladder is nondistended which suggests against acute cholecystitis-common bile duct is mildly dilated and there is a mild intrahepatic biliary dilation as well- new when compared to the October 2019 CT scan- not see any obvious radiodense choledocholithiasis by CT- new mild biliary dilation would include choledocholithiasis or potentially sphincter of Oddi dysfunction-do not see any obstructing lesion at the level of the ampulla/pancreatic head.  Gallbladder ultrasound- No evidence of acute cholecystitis- Gallbladder is nondistended and  there is no wall thickening or visible gallstones, Liver steatosis.   EGD-Small hiatal hernia. - Erythematous mucosa in the gastric body. Biopsied. - Normal examined duodenum. Biopsied.  Colonoscopy- ileum was normal. - Diverticulosis in the sigmoid colon. - Non-bleeding external and internal hemorrhoids. - No specimens collected.  HIDA-Nonvisualization of the gallbladder at 4  hours- concerning for acute cholecystitis.    Hypokalemia.  P.o. potassium.  Magnesium level is normal.     Small hiatal hernia hernia.  Protonix.  Zofran as needed.     Internal hemorrhoids.  Anusol suppository.     Sleep apnea.  Patient is on a CPAP machine at home.  Patient will bring it up to be used.  Patient is on room air..       Diabetes . Low-dose sliding scale.  Hemoglobin A1c 6.6.     Hypertension/hyperlipidemia.  Toprol.  Hold off statin due to possible cholecystitis.     Reflux.  Protonix.  Zofran as needed.     Pain .  Ultram as needed.  Toradol as needed.  Lidocaine patch.  Voltaren gel.  .     Lovenox prophylaxis.     Obesity.  BMI is 38.     Nutrition.  Regular diet/n.p.o. after midnight.    Pathology  Duodenum biopsies: Histologic normal small bowel mucosa. Active duodenitis is not present.  Gastric biopsies: Chronic superficial gastritis.     Medical Decision Making  Number and Complexity of problems: Abdomen pain.  Dilated gallbladder.  Differential Diagnosis: None.     Conditions and Status        Condition is unchanged.     MDM Data  External documents reviewed: Previous note.  Cardiac tracing (EKG, telemetry) interpretation: No monitor.  Radiology interpretation: CT scan/ultrasound  Labs reviewed: Laboratory .   Any tests that were considered but not ordered: Laboratory in AM.     Decision rules/scores evaluated (example [id number], Wells, etc): None     Discussed with: Patient     Care Planning  Shared decision making: Patient  Code status and discussions: Full code     Disposition  Social Determinants of Health that impact treatment or disposition: From home  1 to 2 days        Electronically signed by Harshil Augustin MD, 12/18/24, 12:35 CST.

## 2024-12-18 NOTE — PROGRESS NOTES
[facility]  Inpatient Progress Note      Date of Admission: 2024  Date of Service:  24    Reason for Follow Up: Abdominal pain    Subjective     Subjective:   Had abdominal pain yesterday, but she is pain-free today  HIDA scan suggestive of acute cholecystitis  Denies nausea vomiting  Gastroscopy showed small hiatal hernia, moderate gastritis in the body status post biopsy, normal duodenum status post biopsy  Colonoscopy was normal to the terminal ileum except sigmoid diverticulosis, internal and external hemorrhoids  Had HIDA scan today, results pending      Current Facility-Administered Medications:     dextrose (D50W) (25 g/50 mL) IV injection 25 g, 25 g, Intravenous, Q15 Min PRN, Eun Fuentes MD    dextrose (GLUTOSE) oral gel 15 g, 15 g, Oral, Q15 Min PRN, Eun Fuentes MD    Diclofenac Sodium (VOLTAREN) 1 % gel 4 g, 4 g, Topical, 4x Daily, Harshil Augustin MD    Enoxaparin Sodium (LOVENOX) syringe 40 mg, 40 mg, Subcutaneous, Daily, Rao Iglesias MD, 40 mg at 24 175    glucagon (GLUCAGEN) injection 1 mg, 1 mg, Intramuscular, Q15 Min PRN, Eun Fuentes MD    hydrocortisone (ANUSOL-HC) suppository 25 mg, 25 mg, Rectal, Daily, Harshil Augustin MD    Insulin Lispro (humaLOG) injection 2-7 Units, 2-7 Units, Subcutaneous, 4x Daily AC & at Bedtime, Eun Fuentes MD    ketorolac (TORADOL) injection 15 mg, 15 mg, Intravenous, Q6H PRN, Harshil Augustin MD, 15 mg at 24 1752    Lidocaine 4 % 2 patch, 2 patch, Transdermal, Q24H, Harshil Augustin MD    metoprolol succinate XL (TOPROL-XL) 24 hr tablet 50 mg, 50 mg, Oral, Daily, Harshil Augustin MD, 50 mg at 24 0829    metoprolol tartrate (LOPRESSOR) injection 5 mg, 5 mg, Intravenous, Once, Henry Raphael MD    Morphine sulfate (PF) injection 2 mg, 2 mg, Intravenous, Q4H PRN, Henry Raphael MD, 2 mg at 24    [] HYDROmorphone (DILAUDID) injection 0.5 mg, 0.5 mg, Intravenous, Q4H PRN, 0.5 mg at  12/15/24 2004 **AND** naloxone (NARCAN) injection 0.4 mg, 0.4 mg, Intravenous, Q5 Min PRN, Eun Fuentes MD    ondansetron ODT (ZOFRAN-ODT) disintegrating tablet 4 mg, 4 mg, Oral, Q6H PRN **OR** ondansetron (ZOFRAN) injection 4 mg, 4 mg, Intravenous, Q6H PRN, Eun Fuentes MD    pantoprazole (PROTONIX) EC tablet 40 mg, 40 mg, Oral, Daily, Eun Fuentes MD, 40 mg at 12/18/24 0829    piperacillin-tazobactam (ZOSYN) 3.375 g IVPB in 100 mL NS MBP (CD), 3.375 g, Intravenous, Q8H, Harshil Augustin MD, Last Rate: 0 mL/hr at 12/18/24 0250, 3.375 g at 12/18/24 0647    potassium chloride (MICRO-K/KLOR-CON) CR capsule, 40 mEq, Oral, BID, Harshil Augustin MD, 40 mEq at 12/18/24 0829    sodium chloride 0.9 % flush 10 mL, 10 mL, Intravenous, Q12H, Eun Fuentes MD, 10 mL at 12/17/24 2102    sodium chloride 0.9 % flush 10 mL, 10 mL, Intravenous, PRN, Eun Fuentes MD    sodium chloride 0.9 % infusion 40 mL, 40 mL, Intravenous, PRN, Eun Fuentes MD    traMADol (ULTRAM) tablet 25 mg, 25 mg, Oral, Q4H PRN, Harshil Augustin MD    Review of Systems     Constitution:  negative for chills, fatigue and fevers  Eyes:  negative for blurriness and change of vision  ENT:   negative for sore throat and voice change  Respiratory: negative for  cough and shortness of air  Cardiovascular:  Negative for chest pain or palpitations  Gastrointestinal:  negative for  See HPI  Genitourinary:  negative for  blood in urine and painful urination  Integument: negative for  rash and redness  Hematologic / Lymphatic: negative for  excessive bleeding and easy bruising  Musculoskeletal: negative for  joint pain and joint stiffness out of the ordinary  Neurological:  negative for  seizures and speech abnormality  Behavioral/Psych:  negative for  anxiety and depression out of the ordinary  Endocrine: negative for  diabetes and weight loss, unintended  Allergies / Immunologic:  negative for  anaphylaxis and rash        Objective     Vital Signs  Temp:   [98.3 °F (36.8 °C)-99 °F (37.2 °C)] 98.5 °F (36.9 °C)  Heart Rate:  [] 67  Resp:  [16] 16  BP: (139-152)/(63-95) 142/63  Body mass index is 38.44 kg/m².    Intake/Output Summary (Last 24 hours) at 12/18/2024 1058  Last data filed at 12/17/2024 1831  Gross per 24 hour   Intake 360 ml   Output --   Net 360 ml       No intake/output data recorded.       Physical Exam:   General: patient awake, alert and cooperative   Eyes: Normal lids and lashes, no scleral icterus   Neck: supple, normal ROM   Skin: warm and dry, not jaundiced   Cardiovascular: regular rhythm and rate, no murmurs auscultated   Pulm: clear to auscultation bilaterally, regular and unlabored   Abdomen: soft, nontender, nondistended; normal bowel sounds   Rectal: deferred   Extremities: no rash or edema   Psychiatric: Normal mood and behavior; memory intact         Results Review:    I have reviewed all of the patients current test results  Results from last 7 days   Lab Units 12/18/24  0128 12/17/24  0445 12/16/24  0657   WBC 10*3/mm3 7.16 6.24 8.21   HEMOGLOBIN g/dL 12.5 12.2 14.3   HEMATOCRIT % 41.0 38.8 43.6   PLATELETS 10*3/mm3 214 202 252       Results from last 7 days   Lab Units 12/18/24  0128 12/17/24  0237 12/16/24  0657 12/15/24  0825 12/14/24  2253   SODIUM mmol/L 138 140 139 138 136   POTASSIUM mmol/L 3.3* 3.7 4.0 3.6 3.8   CHLORIDE mmol/L 103 104 106 101 100   CO2 mmol/L 24.0 25.0 24.0 25.0 23.0   BUN mg/dL 10 10 12 10 11   CREATININE mg/dL 0.69 0.70 0.85 0.55* 0.60   CALCIUM mg/dL 8.7 8.5* 9.0 9.2 9.2   BILIRUBIN mg/dL  --  0.5  --  0.6 0.5   ALK PHOS U/L  --  67  --  74 78   ALT (SGPT) U/L  --  21  --  28 31   AST (SGOT) U/L  --  18  --  19 23   GLUCOSE mg/dL 93 119* 103* 129* 144*             Lab Results   Lab Value Date/Time    LIPASE 25 12/14/2024 8027       Radiology:    Imaging Results (Last 24 Hours)       Procedure Component Value Units Date/Time    NM HIDA SCAN WITHOUT PHARMACOLOGICAL INTERVENTION [[id number]] Collected:  12/17/24 1205     Updated: 12/17/24 1524    Narrative:      EXAM: NM HIDA SCAN WITHOUT PHARMACOLOGICAL INTERVENTION- - 12/17/2024  9:06 AM     HISTORY: abd pain; R10.9-Unspecified abdominal pain       COMPARISON: 12/15/2024     TECHNIQUE:    5.0 mCi of Tc-99m Mebrofenin was administered intravenously and serial  anterior planar images over the liver were obtained. 30 mL corn oil  emulsion was administered orally with subsequent imaging per protocol.     FINDINGS:   Homogenous activity throughout the liver with good clearance of  background activity. This indicates good hepatocellular function.      Biliary tree activity is seen at 15 minutes. The gallbladder is not  visualized on 4-hour delay image. Bowel activity is seen at 25 minutes.          Impression:      1. Nonvisualization of the gallbladder at 4 hours, concerning for acute  cholecystitis.     Result communicated by telephone Jolly Stroud patient's nurse at 3:19 PM  on 12/17/2024.      This report was signed and finalized on 12/17/2024 3:21 PM by Dr Crystal Gilbert MD.                 Assessment & Plan       Intractable pain    Heartburn    Belching    Irritable bowel syndrome with diarrhea    Intractable abdominal pain    Hypertension    Hyperlipidemia      Impression/Plan     Intractable pain possibly related to acute cholecystitis    It has been severe enough to bring her to the emergency department and get her admitted.    We do not have any definite evidence of gallbladder disease on ultrasound and CT scan but HIDA scan is positive for acute cholecystitis.  Gastroscopy showed small hiatal hernia, moderate gastritis status post biopsy  Colonoscopy showed internal and external hemorrhoids, left-sided diverticulosis  Plan for surgery today or tomorrow    Will sign off, please call for reevaluation if needed      Electronically signed by Espinoza Molina MD, 12/16/24, 11:15 AM CST.     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  Any results not available at time of inpatient discharge and/or GI clinic follow up should be managed by the hospitalist team, PCP, or outpatient gastroenterologist.    Espinoza Molina MD  12/18/24  10:58 CST

## 2024-12-18 NOTE — CASE MANAGEMENT/SOCIAL WORK
Continued Stay Note  PAIGE Natarajan     Patient Name: Sada aCsey  MRN: [id number]  Today's Date: 12/18/2024    Admit Date: 12/14/2024    Plan: Home   Discharge Plan       Row Name 12/18/24 1423       Plan    Plan Home    Patient/Family in Agreement with Plan yes    Plan Comments Noted that pt will be going to OR tomorrow. Will follow after in case d/c needs arise.                   Discharge Codes    No documentation.                       JOSEPHINE Calvert

## 2024-12-19 ENCOUNTER — ANESTHESIA (OUTPATIENT)
Dept: PERIOP | Facility: HOSPITAL | Age: 69
End: 2024-12-19
Payer: MEDICARE

## 2024-12-19 ENCOUNTER — ANESTHESIA EVENT (OUTPATIENT)
Dept: PERIOP | Facility: HOSPITAL | Age: 69
End: 2024-12-19
Payer: MEDICARE

## 2024-12-19 ENCOUNTER — APPOINTMENT (OUTPATIENT)
Dept: GENERAL RADIOLOGY | Facility: HOSPITAL | Age: 69
End: 2024-12-19
Payer: MEDICARE

## 2024-12-19 ENCOUNTER — APPOINTMENT (OUTPATIENT)
Dept: CARDIOLOGY | Facility: HOSPITAL | Age: 69
End: 2024-12-19
Payer: MEDICARE

## 2024-12-19 LAB
ALBUMIN SERPL-MCNC: 3.2 G/DL (ref 3.5–5.2)
ALBUMIN/GLOB SERPL: 1.2 G/DL
ALP SERPL-CCNC: 63 U/L (ref 39–117)
ALT SERPL W P-5'-P-CCNC: 14 U/L (ref 1–33)
ANION GAP SERPL CALCULATED.3IONS-SCNC: 9 MMOL/L (ref 5–15)
AST SERPL-CCNC: 13 U/L (ref 1–32)
BASOPHILS # BLD AUTO: 0.04 10*3/MM3 (ref 0–0.2)
BASOPHILS NFR BLD AUTO: 0.6 % (ref 0–1.5)
BH CV ECHO MEAS - AO MAX PG: 5.8 MMHG
BH CV ECHO MEAS - AO MEAN PG: 3.2 MMHG
BH CV ECHO MEAS - AO ROOT DIAM: 2.9 CM
BH CV ECHO MEAS - AO V2 MAX: 120.3 CM/SEC
BH CV ECHO MEAS - AO V2 VTI: 28.1 CM
BH CV ECHO MEAS - AVA(I,D): 3.3 CM2
BH CV ECHO MEAS - EDV(CUBED): 117.6 ML
BH CV ECHO MEAS - EDV(MOD-SP2): 47.6 ML
BH CV ECHO MEAS - EDV(MOD-SP4): 78.1 ML
BH CV ECHO MEAS - EF(MOD-SP2): 47.1 %
BH CV ECHO MEAS - EF(MOD-SP4): 68.4 %
BH CV ECHO MEAS - ESV(CUBED): 29.6 ML
BH CV ECHO MEAS - ESV(MOD-SP2): 25.2 ML
BH CV ECHO MEAS - ESV(MOD-SP4): 24.7 ML
BH CV ECHO MEAS - FS: 36.9 %
BH CV ECHO MEAS - IVS/LVPW: 1.1 CM
BH CV ECHO MEAS - IVSD: 1.27 CM
BH CV ECHO MEAS - LAT PEAK E' VEL: 7 CM/SEC
BH CV ECHO MEAS - LV DIASTOLIC VOL/BSA (35-75): 39 CM2
BH CV ECHO MEAS - LV MASS(C)D: 229.3 GRAMS
BH CV ECHO MEAS - LV MAX PG: 4.1 MMHG
BH CV ECHO MEAS - LV MEAN PG: 2.15 MMHG
BH CV ECHO MEAS - LV SYSTOLIC VOL/BSA (12-30): 12.3 CM2
BH CV ECHO MEAS - LV V1 MAX: 101.5 CM/SEC
BH CV ECHO MEAS - LV V1 VTI: 24.8 CM
BH CV ECHO MEAS - LVIDD: 4.9 CM
BH CV ECHO MEAS - LVIDS: 3.1 CM
BH CV ECHO MEAS - LVOT AREA: 3.8 CM2
BH CV ECHO MEAS - LVOT DIAM: 2.19 CM
BH CV ECHO MEAS - LVPWD: 1.15 CM
BH CV ECHO MEAS - MED PEAK E' VEL: 7 CM/SEC
BH CV ECHO MEAS - MR MAX PG: 2.7 MMHG
BH CV ECHO MEAS - MR MAX VEL: 82.1 CM/SEC
BH CV ECHO MEAS - MV A MAX VEL: 78.5 CM/SEC
BH CV ECHO MEAS - MV DEC SLOPE: 202.7 CM/SEC2
BH CV ECHO MEAS - MV DEC TIME: 0.26 SEC
BH CV ECHO MEAS - MV E MAX VEL: 45.5 CM/SEC
BH CV ECHO MEAS - MV E/A: 0.58
BH CV ECHO MEAS - MV MAX PG: 2.6 MMHG
BH CV ECHO MEAS - MV MEAN PG: 0.9 MMHG
BH CV ECHO MEAS - MV V2 VTI: 33.9 CM
BH CV ECHO MEAS - MVA(VTI): 2.8 CM2
BH CV ECHO MEAS - PA V2 MAX: 84.3 CM/SEC
BH CV ECHO MEAS - RAP SYSTOLE: 8 MMHG
BH CV ECHO MEAS - RVSP: 11.5 MMHG
BH CV ECHO MEAS - SV(LVOT): 93.4 ML
BH CV ECHO MEAS - SV(MOD-SP2): 22.4 ML
BH CV ECHO MEAS - SV(MOD-SP4): 53.4 ML
BH CV ECHO MEAS - SVI(LVOT): 46.6 ML/M2
BH CV ECHO MEAS - SVI(MOD-SP2): 11.2 ML/M2
BH CV ECHO MEAS - SVI(MOD-SP4): 26.7 ML/M2
BH CV ECHO MEAS - TR MAX PG: 3.5 MMHG
BH CV ECHO MEAS - TR MAX VEL: 93.9 CM/SEC
BH CV ECHO MEASUREMENTS AVERAGE E/E' RATIO: 6.5
BH CV XLRA - RV BASE: 4.1 CM
BH CV XLRA - RV MID: 3.3 CM
BILIRUB SERPL-MCNC: 0.6 MG/DL (ref 0–1.2)
BUN SERPL-MCNC: 15 MG/DL (ref 8–23)
BUN/CREAT SERPL: 18.8 (ref 7–25)
CALCIUM SPEC-SCNC: 8.4 MG/DL (ref 8.6–10.5)
CHLORIDE SERPL-SCNC: 106 MMOL/L (ref 98–107)
CO2 SERPL-SCNC: 25 MMOL/L (ref 22–29)
CREAT SERPL-MCNC: 0.8 MG/DL (ref 0.57–1)
DEPRECATED RDW RBC AUTO: 42.8 FL (ref 37–54)
EGFRCR SERPLBLD CKD-EPI 2021: 79.4 ML/MIN/1.73
EOSINOPHIL # BLD AUTO: 0.19 10*3/MM3 (ref 0–0.4)
EOSINOPHIL NFR BLD AUTO: 3 % (ref 0.3–6.2)
ERYTHROCYTE [DISTWIDTH] IN BLOOD BY AUTOMATED COUNT: 13.3 % (ref 12.3–15.4)
GLOBULIN UR ELPH-MCNC: 2.7 GM/DL
GLUCOSE BLDC GLUCOMTR-MCNC: 107 MG/DL (ref 70–130)
GLUCOSE BLDC GLUCOMTR-MCNC: 109 MG/DL (ref 70–130)
GLUCOSE BLDC GLUCOMTR-MCNC: 143 MG/DL (ref 70–130)
GLUCOSE SERPL-MCNC: 116 MG/DL (ref 65–99)
HCT VFR BLD AUTO: 38 % (ref 34–46.6)
HGB BLD-MCNC: 11.5 G/DL (ref 12–15.9)
IMM GRANULOCYTES # BLD AUTO: 0.02 10*3/MM3 (ref 0–0.05)
IMM GRANULOCYTES NFR BLD AUTO: 0.3 % (ref 0–0.5)
LYMPHOCYTES # BLD AUTO: 2.2 10*3/MM3 (ref 0.7–3.1)
LYMPHOCYTES NFR BLD AUTO: 34.2 % (ref 19.6–45.3)
MCH RBC QN AUTO: 26.4 PG (ref 26.6–33)
MCHC RBC AUTO-ENTMCNC: 30.3 G/DL (ref 31.5–35.7)
MCV RBC AUTO: 87.4 FL (ref 79–97)
MONOCYTES # BLD AUTO: 0.55 10*3/MM3 (ref 0.1–0.9)
MONOCYTES NFR BLD AUTO: 8.5 % (ref 5–12)
NEUTROPHILS NFR BLD AUTO: 3.44 10*3/MM3 (ref 1.7–7)
NEUTROPHILS NFR BLD AUTO: 53.4 % (ref 42.7–76)
NRBC BLD AUTO-RTO: 0 /100 WBC (ref 0–0.2)
PLATELET # BLD AUTO: 216 10*3/MM3 (ref 140–450)
PMV BLD AUTO: 9.4 FL (ref 6–12)
POTASSIUM SERPL-SCNC: 4 MMOL/L (ref 3.5–5.2)
PROT SERPL-MCNC: 5.9 G/DL (ref 6–8.5)
RBC # BLD AUTO: 4.35 10*6/MM3 (ref 3.77–5.28)
SODIUM SERPL-SCNC: 140 MMOL/L (ref 136–145)
WBC NRBC COR # BLD AUTO: 6.44 10*3/MM3 (ref 3.4–10.8)

## 2024-12-19 PROCEDURE — 25010000002 PROPOFOL 10 MG/ML EMULSION

## 2024-12-19 PROCEDURE — 25010000002 ROPIVACAINE PER 1 MG: Performed by: SURGERY

## 2024-12-19 PROCEDURE — 25010000002 INDOCYANINE GREEN 25 MG RECONSTITUTED SOLUTION: Performed by: SURGERY

## 2024-12-19 PROCEDURE — 88331 PATH CONSLTJ SURG 1 BLK 1SPC: CPT | Performed by: SURGERY

## 2024-12-19 PROCEDURE — 25010000002 FENTANYL CITRATE (PF) 100 MCG/2ML SOLUTION

## 2024-12-19 PROCEDURE — 25010000002 HYDROMORPHONE PER 4 MG: Performed by: ANESTHESIOLOGY

## 2024-12-19 PROCEDURE — 47563 LAPARO CHOLECYSTECTOMY/GRAPH: CPT | Performed by: SURGERY

## 2024-12-19 PROCEDURE — 25510000001 PERFLUTREN 6.52 MG/ML SUSPENSION: Performed by: FAMILY MEDICINE

## 2024-12-19 PROCEDURE — 74018 RADEX ABDOMEN 1 VIEW: CPT

## 2024-12-19 PROCEDURE — 25010000002 GLYCOPYRROLATE 0.4 MG/2ML SOLUTION

## 2024-12-19 PROCEDURE — 25010000002 DEXAMETHASONE PER 1 MG: Performed by: ANESTHESIOLOGY

## 2024-12-19 PROCEDURE — 25510000001 IOPAMIDOL 61 % SOLUTION: Performed by: SURGERY

## 2024-12-19 PROCEDURE — 25010000002 AMPICILLIN-SULBACTAM PER 1.5 G: Performed by: SURGERY

## 2024-12-19 PROCEDURE — G0378 HOSPITAL OBSERVATION PER HR: HCPCS

## 2024-12-19 PROCEDURE — 76000 FLUOROSCOPY <1 HR PHYS/QHP: CPT

## 2024-12-19 PROCEDURE — 25810000003 LACTATED RINGERS PER 1000 ML: Performed by: ANESTHESIOLOGY

## 2024-12-19 PROCEDURE — 88304 TISSUE EXAM BY PATHOLOGIST: CPT | Performed by: SURGERY

## 2024-12-19 PROCEDURE — 80053 COMPREHEN METABOLIC PANEL: CPT | Performed by: FAMILY MEDICINE

## 2024-12-19 PROCEDURE — 25010000002 ENOXAPARIN PER 10 MG: Performed by: SURGERY

## 2024-12-19 PROCEDURE — C1894 INTRO/SHEATH, NON-LASER: HCPCS | Performed by: SURGERY

## 2024-12-19 PROCEDURE — 93306 TTE W/DOPPLER COMPLETE: CPT | Performed by: INTERNAL MEDICINE

## 2024-12-19 PROCEDURE — 25010000002 KETOROLAC TROMETHAMINE PER 15 MG: Performed by: SURGERY

## 2024-12-19 PROCEDURE — 25010000002 DEXAMETHASONE PER 1 MG

## 2024-12-19 PROCEDURE — 25010000002 KETOROLAC TROMETHAMINE PER 15 MG

## 2024-12-19 PROCEDURE — 25010000002 LIDOCAINE PF 2% 2 % SOLUTION

## 2024-12-19 PROCEDURE — 82948 REAGENT STRIP/BLOOD GLUCOSE: CPT

## 2024-12-19 PROCEDURE — 93306 TTE W/DOPPLER COMPLETE: CPT

## 2024-12-19 PROCEDURE — 25010000002 CEFAZOLIN PER 500 MG

## 2024-12-19 PROCEDURE — 25010000002 AMPICILLIN-SULBACTAM PER 1.5 G: Performed by: FAMILY MEDICINE

## 2024-12-19 PROCEDURE — 25010000002 ONDANSETRON PER 1 MG

## 2024-12-19 PROCEDURE — 85025 COMPLETE CBC W/AUTO DIFF WBC: CPT | Performed by: FAMILY MEDICINE

## 2024-12-19 DEVICE — LIGACLIP 10-M/L, 10MM ENDOSCOPIC ROTATING MULTIPLE CLIP APPLIERS
Type: IMPLANTABLE DEVICE | Site: ABDOMEN | Status: FUNCTIONAL
Brand: LIGACLIP

## 2024-12-19 DEVICE — LARGE LIGATION CLIPS 6 CLIPS/CART
Type: IMPLANTABLE DEVICE | Site: ABDOMEN | Status: FUNCTIONAL
Brand: VAS-Q-CLIP

## 2024-12-19 RX ORDER — KETOROLAC TROMETHAMINE 30 MG/ML
INJECTION, SOLUTION INTRAMUSCULAR; INTRAVENOUS AS NEEDED
Status: DISCONTINUED | OUTPATIENT
Start: 2024-12-19 | End: 2024-12-19 | Stop reason: SURG

## 2024-12-19 RX ORDER — DEXAMETHASONE SODIUM PHOSPHATE 4 MG/ML
4 INJECTION, SOLUTION INTRA-ARTICULAR; INTRALESIONAL; INTRAMUSCULAR; INTRAVENOUS; SOFT TISSUE ONCE
Status: COMPLETED | OUTPATIENT
Start: 2024-12-19 | End: 2024-12-19

## 2024-12-19 RX ORDER — CYCLOBENZAPRINE HCL 5 MG
5 TABLET ORAL EVERY 8 HOURS PRN
Qty: 30 TABLET | Refills: 0 | Status: SHIPPED | OUTPATIENT
Start: 2024-12-19 | End: 2024-12-29

## 2024-12-19 RX ORDER — ONDANSETRON 2 MG/ML
4 INJECTION INTRAMUSCULAR; INTRAVENOUS
Status: DISCONTINUED | OUTPATIENT
Start: 2024-12-19 | End: 2024-12-19

## 2024-12-19 RX ORDER — SODIUM CHLORIDE, SODIUM LACTATE, POTASSIUM CHLORIDE, CALCIUM CHLORIDE 600; 310; 30; 20 MG/100ML; MG/100ML; MG/100ML; MG/100ML
100 INJECTION, SOLUTION INTRAVENOUS CONTINUOUS
Status: DISCONTINUED | OUTPATIENT
Start: 2024-12-19 | End: 2024-12-19

## 2024-12-19 RX ORDER — FLUMAZENIL 0.1 MG/ML
0.2 INJECTION INTRAVENOUS AS NEEDED
Status: DISCONTINUED | OUTPATIENT
Start: 2024-12-19 | End: 2024-12-19

## 2024-12-19 RX ORDER — LIDOCAINE HYDROCHLORIDE 20 MG/ML
INJECTION, SOLUTION EPIDURAL; INFILTRATION; INTRACAUDAL; PERINEURAL AS NEEDED
Status: DISCONTINUED | OUTPATIENT
Start: 2024-12-19 | End: 2024-12-19 | Stop reason: SURG

## 2024-12-19 RX ORDER — IBUPROFEN 600 MG/1
600 TABLET, FILM COATED ORAL EVERY 6 HOURS PRN
Status: DISCONTINUED | OUTPATIENT
Start: 2024-12-19 | End: 2024-12-19

## 2024-12-19 RX ORDER — DEXAMETHASONE SODIUM PHOSPHATE 4 MG/ML
INJECTION, SOLUTION INTRA-ARTICULAR; INTRALESIONAL; INTRAMUSCULAR; INTRAVENOUS; SOFT TISSUE AS NEEDED
Status: DISCONTINUED | OUTPATIENT
Start: 2024-12-19 | End: 2024-12-19 | Stop reason: SURG

## 2024-12-19 RX ORDER — NALOXONE HCL 0.4 MG/ML
0.04 VIAL (ML) INJECTION AS NEEDED
Status: DISCONTINUED | OUTPATIENT
Start: 2024-12-19 | End: 2024-12-19

## 2024-12-19 RX ORDER — IOPAMIDOL 612 MG/ML
INJECTION, SOLUTION INTRAVASCULAR AS NEEDED
Status: DISCONTINUED | OUTPATIENT
Start: 2024-12-19 | End: 2024-12-19 | Stop reason: HOSPADM

## 2024-12-19 RX ORDER — SODIUM CHLORIDE 0.9 % (FLUSH) 0.9 %
3-10 SYRINGE (ML) INJECTION AS NEEDED
Status: DISCONTINUED | OUTPATIENT
Start: 2024-12-19 | End: 2024-12-19 | Stop reason: HOSPADM

## 2024-12-19 RX ORDER — MIDAZOLAM HYDROCHLORIDE 2 MG/2ML
0.5 INJECTION, SOLUTION INTRAMUSCULAR; INTRAVENOUS
Status: DISCONTINUED | OUTPATIENT
Start: 2024-12-19 | End: 2024-12-19 | Stop reason: HOSPADM

## 2024-12-19 RX ORDER — GLYCOPYRROLATE 0.2 MG/ML
INJECTION INTRAMUSCULAR; INTRAVENOUS AS NEEDED
Status: DISCONTINUED | OUTPATIENT
Start: 2024-12-19 | End: 2024-12-19 | Stop reason: SURG

## 2024-12-19 RX ORDER — HYDROMORPHONE HYDROCHLORIDE 1 MG/ML
0.5 INJECTION, SOLUTION INTRAMUSCULAR; INTRAVENOUS; SUBCUTANEOUS
Status: DISCONTINUED | OUTPATIENT
Start: 2024-12-19 | End: 2024-12-19

## 2024-12-19 RX ORDER — INDOCYANINE GREEN AND WATER 25 MG
7.5 KIT INJECTION ONCE
Status: COMPLETED | OUTPATIENT
Start: 2024-12-19 | End: 2024-12-19

## 2024-12-19 RX ORDER — OXYCODONE HYDROCHLORIDE 5 MG/1
5 TABLET ORAL EVERY 6 HOURS PRN
Qty: 12 TABLET | Refills: 0 | Status: SHIPPED | OUTPATIENT
Start: 2024-12-19 | End: 2024-12-22

## 2024-12-19 RX ORDER — LABETALOL HYDROCHLORIDE 5 MG/ML
5 INJECTION, SOLUTION INTRAVENOUS
Status: DISCONTINUED | OUTPATIENT
Start: 2024-12-19 | End: 2024-12-19

## 2024-12-19 RX ORDER — TRAMADOL HYDROCHLORIDE 50 MG/1
50 TABLET ORAL EVERY 4 HOURS PRN
Status: DISCONTINUED | OUTPATIENT
Start: 2024-12-19 | End: 2024-12-20 | Stop reason: HOSPADM

## 2024-12-19 RX ORDER — PROPOFOL 10 MG/ML
VIAL (ML) INTRAVENOUS AS NEEDED
Status: DISCONTINUED | OUTPATIENT
Start: 2024-12-19 | End: 2024-12-19 | Stop reason: SURG

## 2024-12-19 RX ORDER — SODIUM CHLORIDE 0.9 % (FLUSH) 0.9 %
3 SYRINGE (ML) INJECTION EVERY 12 HOURS SCHEDULED
Status: DISCONTINUED | OUTPATIENT
Start: 2024-12-19 | End: 2024-12-19 | Stop reason: HOSPADM

## 2024-12-19 RX ORDER — MAGNESIUM HYDROXIDE 1200 MG/15ML
LIQUID ORAL AS NEEDED
Status: DISCONTINUED | OUTPATIENT
Start: 2024-12-19 | End: 2024-12-19 | Stop reason: HOSPADM

## 2024-12-19 RX ORDER — SODIUM CHLORIDE 9 MG/ML
40 INJECTION, SOLUTION INTRAVENOUS AS NEEDED
Status: DISCONTINUED | OUTPATIENT
Start: 2024-12-19 | End: 2024-12-19 | Stop reason: HOSPADM

## 2024-12-19 RX ORDER — FLECAINIDE ACETATE 50 MG/1
25 TABLET ORAL EVERY 12 HOURS SCHEDULED
Status: DISCONTINUED | OUTPATIENT
Start: 2024-12-19 | End: 2024-12-20 | Stop reason: HOSPADM

## 2024-12-19 RX ORDER — FENTANYL CITRATE 50 UG/ML
50 INJECTION, SOLUTION INTRAMUSCULAR; INTRAVENOUS
Status: DISCONTINUED | OUTPATIENT
Start: 2024-12-19 | End: 2024-12-19

## 2024-12-19 RX ORDER — CEFAZOLIN SODIUM 1 G/3ML
INJECTION, POWDER, FOR SOLUTION INTRAMUSCULAR; INTRAVENOUS AS NEEDED
Status: DISCONTINUED | OUTPATIENT
Start: 2024-12-19 | End: 2024-12-19 | Stop reason: SURG

## 2024-12-19 RX ORDER — FENTANYL CITRATE 50 UG/ML
INJECTION, SOLUTION INTRAMUSCULAR; INTRAVENOUS AS NEEDED
Status: DISCONTINUED | OUTPATIENT
Start: 2024-12-19 | End: 2024-12-19 | Stop reason: SURG

## 2024-12-19 RX ORDER — OXYCODONE AND ACETAMINOPHEN 10; 325 MG/1; MG/1
1 TABLET ORAL EVERY 4 HOURS PRN
Status: DISCONTINUED | OUTPATIENT
Start: 2024-12-19 | End: 2024-12-19

## 2024-12-19 RX ORDER — ROCURONIUM BROMIDE 10 MG/ML
INJECTION, SOLUTION INTRAVENOUS AS NEEDED
Status: DISCONTINUED | OUTPATIENT
Start: 2024-12-19 | End: 2024-12-19 | Stop reason: SURG

## 2024-12-19 RX ORDER — ONDANSETRON 2 MG/ML
INJECTION INTRAMUSCULAR; INTRAVENOUS AS NEEDED
Status: DISCONTINUED | OUTPATIENT
Start: 2024-12-19 | End: 2024-12-19 | Stop reason: SURG

## 2024-12-19 RX ORDER — NEOSTIGMINE METHYLSULFATE 5 MG/5 ML
SYRINGE (ML) INTRAVENOUS AS NEEDED
Status: DISCONTINUED | OUTPATIENT
Start: 2024-12-19 | End: 2024-12-19 | Stop reason: SURG

## 2024-12-19 RX ORDER — ACETAMINOPHEN 500 MG
1000 TABLET ORAL ONCE
Status: COMPLETED | OUTPATIENT
Start: 2024-12-19 | End: 2024-12-19

## 2024-12-19 RX ADMIN — FENTANYL CITRATE 100 MCG: 50 INJECTION, SOLUTION INTRAMUSCULAR; INTRAVENOUS at 14:14

## 2024-12-19 RX ADMIN — DICLOFENAC SODIUM 4 G: 10 GEL TOPICAL at 08:30

## 2024-12-19 RX ADMIN — Medication 10 ML: at 08:30

## 2024-12-19 RX ADMIN — HYDROMORPHONE HYDROCHLORIDE 0.5 MG: 1 INJECTION, SOLUTION INTRAMUSCULAR; INTRAVENOUS; SUBCUTANEOUS at 17:10

## 2024-12-19 RX ADMIN — LIDOCAINE HYDROCHLORIDE 100 MG: 20 INJECTION, SOLUTION EPIDURAL; INFILTRATION; INTRACAUDAL; PERINEURAL at 14:14

## 2024-12-19 RX ADMIN — AMPICILLIN SODIUM, SULBACTAM SODIUM 3 G: 2; 1 INJECTION, POWDER, FOR SOLUTION INTRAMUSCULAR; INTRAVENOUS at 05:49

## 2024-12-19 RX ADMIN — SODIUM CHLORIDE, POTASSIUM CHLORIDE, SODIUM LACTATE AND CALCIUM CHLORIDE 100 ML/HR: 600; 310; 30; 20 INJECTION, SOLUTION INTRAVENOUS at 13:32

## 2024-12-19 RX ADMIN — AMPICILLIN SODIUM, SULBACTAM SODIUM 3 G: 2; 1 INJECTION, POWDER, FOR SOLUTION INTRAMUSCULAR; INTRAVENOUS at 18:31

## 2024-12-19 RX ADMIN — GLYCOPYRROLATE 0.2 MG: 0.2 INJECTION INTRAMUSCULAR; INTRAVENOUS at 15:27

## 2024-12-19 RX ADMIN — ACETAMINOPHEN TAB 500 MG 1000 MG: 500 TAB at 13:39

## 2024-12-19 RX ADMIN — INDOCYANINE GREEN AND WATER 7.5 MG: KIT at 13:36

## 2024-12-19 RX ADMIN — HYDROMORPHONE HYDROCHLORIDE 0.5 MG: 1 INJECTION, SOLUTION INTRAMUSCULAR; INTRAVENOUS; SUBCUTANEOUS at 16:50

## 2024-12-19 RX ADMIN — GLYCOPYRROLATE 0.4 MG: 0.2 INJECTION INTRAMUSCULAR; INTRAVENOUS at 15:19

## 2024-12-19 RX ADMIN — KETOROLAC TROMETHAMINE 15 MG: 15 INJECTION, SOLUTION INTRAMUSCULAR; INTRAVENOUS at 21:36

## 2024-12-19 RX ADMIN — FLECAINIDE ACETATE 25 MG: 50 TABLET ORAL at 20:55

## 2024-12-19 RX ADMIN — AMPICILLIN SODIUM, SULBACTAM SODIUM 3 G: 2; 1 INJECTION, POWDER, FOR SOLUTION INTRAMUSCULAR; INTRAVENOUS at 00:22

## 2024-12-19 RX ADMIN — PERFLUTREN 6.52 MG: 6.52 INJECTION, SUSPENSION INTRAVENOUS at 13:08

## 2024-12-19 RX ADMIN — ONDANSETRON 4 MG: 2 INJECTION INTRAMUSCULAR; INTRAVENOUS at 15:22

## 2024-12-19 RX ADMIN — FENTANYL CITRATE 100 MCG: 50 INJECTION, SOLUTION INTRAMUSCULAR; INTRAVENOUS at 15:21

## 2024-12-19 RX ADMIN — DEXAMETHASONE SODIUM PHOSPHATE 4 MG: 4 INJECTION, SOLUTION INTRA-ARTICULAR; INTRALESIONAL; INTRAMUSCULAR; INTRAVENOUS; SOFT TISSUE at 15:22

## 2024-12-19 RX ADMIN — CEFAZOLIN 2 G: 1 INJECTION, POWDER, FOR SOLUTION INTRAMUSCULAR; INTRAVENOUS; PARENTERAL at 14:19

## 2024-12-19 RX ADMIN — KETOROLAC TROMETHAMINE 15 MG: 30 INJECTION, SOLUTION INTRAMUSCULAR; INTRAVENOUS at 15:22

## 2024-12-19 RX ADMIN — DEXAMETHASONE SODIUM PHOSPHATE 4 MG: 4 INJECTION, SOLUTION INTRA-ARTICULAR; INTRALESIONAL; INTRAMUSCULAR; INTRAVENOUS; SOFT TISSUE at 13:39

## 2024-12-19 RX ADMIN — ENOXAPARIN SODIUM 40 MG: 100 INJECTION SUBCUTANEOUS at 10:23

## 2024-12-19 RX ADMIN — Medication 3 MG: at 15:19

## 2024-12-19 RX ADMIN — PROPOFOL 140 MG: 10 INJECTION, EMULSION INTRAVENOUS at 14:14

## 2024-12-19 RX ADMIN — ROCURONIUM BROMIDE 40 MG: 10 INJECTION, SOLUTION INTRAVENOUS at 14:14

## 2024-12-19 NOTE — OP NOTE
PREOPERATIVE DIAGNOSIS: Chronic calculous cholecystitis        POSTOPERATIVE DIAGNOSIS: Acute gangrenous cholecystitis, gallbladder hydrops with purulent bile        PROCEDURE PERFORMED: Robotic cholecystectomy with intraoperative cholangiogram       SURGEON: Rao Iglesias MD        ASSISTANT:  Denis Coronel CST, CST        SPECIMENS: Gallbladder for frozen and permanent        ANESTHESIA: General tracheal anesthesia with bilateral TAP block        BLOOD LOSS:  5 mL    FLUIDS: 1000 mL        WOUND CLASSIFICATION: Class 4, dirty        FINDINGS:   Massively distended fibrotic and inflamed gallbladder.  Gallbladder was densely adherent to the surrounding greater omentum and to the duodenum.  Gallbladder was sent for frozen section to rule out gallbladder malignancy.  Critical view of safety obtained with a solitary cystic duct and a solitary cystic artery and a clear space the entire way up the gallbladder fossa.  Normal biliary anatomy.  Common bile duct clearly visualized with ICG under firefly.  Cholangiogram showed a mild narrowing in the common hepatic duct and.  Excellent hemostasis.         INDICATIONS:   Sada Casey is a 69 y.o. female with suspected gallbladder disease in the setting of a negative workup for any active cardiac disease.  A HIDA scan showed obstruction of the cystic duct.         DESCRIPTION OF PROCEDURE:      Informed consent was obtained. The patient was taken to the operating room and placed on the operating table in the supine position. Bilateral SCD were placed. General anesthesia was administered and the patient was intubated without difficulty. Ancef 2 g was administered for preoperative antibiotics. The abdomen was prepped and draped in the usual fashion.  A full surgical timeout was performed verifying the correct patient, correct procedure and correct site for surgery.     Local anesthesia was infiltrated into the left upper quadrant at Haddad's point.  A small incision was made  and a Veress needle was inserted with 2 satisfactory clicks.  An excellent saline drop test confirmed correct intra-abdominal placement.  Pneumoperitoneum was initiated to 12 mmHg.  Patient was positioned in reverse Trendelenburg.  Local anesthesia was injected in the left lower hypogastrium.  An 8 mm robotic trocar was inserted under direct visualization in Optiview fashion.  The abdomen was entered safely.  A bilateral TAP block was performed using 10 mL of 1% ropivacaine and 100 mcg of Precedex and 100 mL of injectable saline.  Local was injected at the usual 3 additional trocar sites transversely across the abdomen.  Incisions were made and 3 more robotic trocars were inserted under direct visualization.     The robot was docked and instruments were placed.  The gallbladder was buried under the greater omentum.  Was a significant amount of fibrosis from the dome of the gallbladder to that adjacent greater omentum.  Given the thickened parents of the gallbladder, I was concerned that there could be an underlying gallbladder malignancy.  A 1 cm section of the dome of the gallbladder with the most significant amount of fibrosis was sent to pathology for a frozen section.  From the cholecystotomy, clear bile was drained.  Once the suction  was inserted into the gallbladder to suction out the remainder of the bile, purulent drainage was noted.  There were two 1 cm stones that were extracted and removed from the abdominal cavity.  Next, the gallbladder was was retracted cephalad and the peritoneum was incised using hook cautery. A top down dissection was performed.  Careful dissection was undertaken in the cystohepatic triangle. The dissection was of significant difficulty given the amount of inflammation and scar tissue. There was normal biliary anatomy.  The cystic duct was fairly dilated. A critical view of safety was obtained of a solitary cystic duct and a solitary cystic artery was obtained with a clear  space posteriorly. Clips were placed on the cystic artery which was divided with electrocautery. A clip was placed high up on the cystic duct up to the neck of the gallbladder and a ductotomy was made.  The cholangiogram catheter was assembled and inserted into the cystic duct.  Saline was flushed without extravasation.  Under fluoroscopy, contrast was injected with filling of the cystic duct. There was retrograde filling of the common hepatic duct and the left and right hepatic duct branches.  Common hepatic duct was narrowed and the common bile duct was mildly dilated.  There was appropriate filling of the secondary hepatic radicles.  Contrast migrated anterograde through the common bile duct without any obvious filling defects and entered the duodenum in appropriate transit time.  The catheter was flushed with saline and removed.  The cystic duct stump was ligated using several laparoscopic clips followed by a PDS Endoloop.  The cystic duct was divided and removed through the supraumbilical car site using an Endo Catch bag. The fascia of the extraction site was closed with a 0 Vicryl suture in a figure-of-eight fashion using a transfascial suture passer.  Given the presence of purulent bile and amount of inflammation, a 19 Portuguese Nate drain was placed in the subhepatic space exiting out of the right lower quadrant.  This was sewn to the skin using a 2-0 nylon suture.  There was meticulous hemostasis.  The trocars were removed under direct visualization and pneumoperitoneum was released.  The incisions were irrigated using saline and closed with 4-0 Monocryl suture.  The incisions were dressed with Mastisol and Steri-Strips.     At the conclusion of the case all needle, sharp and sponge counts were correct x 2. The patient was awoken from anesthesia, extubated in the operating room and was taken to the post anesthesia care unit in stable condition without any immediate complications.

## 2024-12-19 NOTE — ANESTHESIA PREPROCEDURE EVALUATION
Anesthesia Evaluation     Patient summary reviewed   no history of anesthetic complications:   NPO Solid Status: > 8 hours             Airway   Mallampati: II  Dental      Pulmonary    (+) ,sleep apnea on CPAP  (-) not a smoker  Cardiovascular   Exercise tolerance: excellent (>7 METS)    (+) hypertension, dysrhythmias Bradycardia, DVT      Neuro/Psych  (+) TIA, numbness  GI/Hepatic/Renal/Endo    (+) obesity, morbid obesity, GERD    Musculoskeletal     Abdominal   (+) obese   Substance History      OB/GYN          Other                          Anesthesia Plan    ASA 3     general     (intractable epigastric pain, now for cholecystectomy )  intravenous induction     Anesthetic plan, risks, benefits, and alternatives have been provided, discussed and informed consent has been obtained with: patient.

## 2024-12-19 NOTE — PLAN OF CARE
Goal Outcome Evaluation:  Plan of Care Reviewed With: patient        Progress: improving  Outcome Evaluation: Patient stable overnight, rested quietly. IV abx given. C/O pain see mar. NPO since midnight for lap scott today. up ad danilo. call light within reach and safety maintained

## 2024-12-19 NOTE — PROGRESS NOTES
[facility] Medicine Services  INPATIENT PROGRESS NOTE    Patient Name: Sada Casey  Date of Admission: 12/14/2024  Today's Date: 12/19/24  Length of Stay: 0  Primary Care Physician: Jennifer Hummel DO    Subjective   Chief Complaint: Cholecystitis.   HPI   Blood pressure stable slightly bradycardia.  Plan for cholecystectomy today.  White blood cells normal. Slight decrease in hemoglobin.    Review of Systems   Constitutional:  Positive for activity change, appetite change and fatigue. Negative for chills and fever.   HENT:  Negative for hearing loss, nosebleeds, tinnitus and trouble swallowing.    Eyes:  Negative for visual disturbance.   Respiratory:  Negative for cough, chest tightness, shortness of breath and wheezing.    Cardiovascular:  Negative for chest pain, palpitations and leg swelling.   Gastrointestinal:  Negative for abdominal distention, abdominal pain, blood in stool, constipation, diarrhea, nausea and vomiting.   Endocrine: Negative for cold intolerance, heat intolerance, polydipsia, polyphagia and polyuria.   Genitourinary:  Negative for decreased urine volume, difficulty urinating, dysuria, flank pain, frequency and hematuria.   Musculoskeletal:  Negative for arthralgias, joint swelling and myalgias.   Skin:  Negative for rash.   Allergic/Immunologic: Negative for immunocompromised state.   Neurological:  Positive for weakness. Negative for dizziness, syncope, light-headedness and headaches.   Hematological:  Negative for adenopathy. Does not bruise/bleed easily.   Psychiatric/Behavioral:  Negative for confusion and sleep disturbance. The patient is not nervous/anxious.  All pertinent negatives and positives are as above. All other systems have been reviewed and are negative unless otherwise stated.     Objective    Temp:  [97.8 °F (36.6 °C)-98.5 °F (36.9 °C)] 97.9 °F (36.6 °C)  Heart Rate:  [54-73] 59  Resp:  [16-18] 16  BP: (103-161)/(48-79)  139/59  Physical Exam  Vitals and nursing note reviewed.   Constitutional:       General: She is not in acute distress.     Appearance: She is not toxic-appearing.   HENT:      Head: Normocephalic and atraumatic.      Mouth/Throat:      Mouth: Mucous membranes are moist.      Pharynx: Oropharynx is clear.   Eyes:      Extraocular Movements: Extraocular movements intact.      Conjunctiva/sclera: Conjunctivae normal.      Pupils: Pupils are equal, round, and reactive to light.   Cardiovascular:      Rate and Rhythm: Normal rate and regular rhythm.      Pulses: Normal pulses.      Heart sounds: No murmur heard.  Pulmonary:      Effort: Pulmonary effort is normal. No respiratory distress.      Breath sounds: Normal breath sounds. No wheezing or rhonchi.   Abdominal:      General: Bowel sounds are normal. There is no distension.      Palpations: Abdomen is soft.      Tenderness: There is no abdominal tenderness.      Comments: Obesity.   Musculoskeletal:         General: No swelling or tenderness. Normal range of motion.      Cervical back: Normal range of motion and neck supple. No muscular tenderness.   Skin:     General: Skin is warm and dry.      Capillary Refill: Capillary refill takes 2 to 3 seconds.      Findings: No erythema or rash.   Neurological:      General: No focal deficit present.      Mental Status: She is alert and oriented to person, place, and time.      Cranial Nerves: No cranial nerve deficit.      Motor: No weakness.   Psychiatric:         Mood and Affect: Mood normal.         Behavior: Behavior normal.          Results Review:  I have reviewed the labs, radiology results, and diagnostic studies.    Laboratory Data:   Results from last 7 days   Lab Units 12/19/24  0328 12/18/24  0128 12/17/24  0445   WBC 10*3/mm3 6.44 7.16 6.24   HEMOGLOBIN g/dL 11.5* 12.5 12.2   HEMATOCRIT % 38.0 41.0 38.8   PLATELETS 10*3/mm3 216 214 202        Results from last 7 days   Lab Units 12/19/24  0328 12/18/24  0128  "12/17/24  0237 12/16/24  0657 12/15/24  0825   SODIUM mmol/L 140 138 140   < > 138   POTASSIUM mmol/L 4.0 3.3* 3.7   < > 3.6   CHLORIDE mmol/L 106 103 104   < > 101   CO2 mmol/L 25.0 24.0 25.0   < > 25.0   BUN mg/dL 15 10 10   < > 10   CREATININE mg/dL 0.80 0.69 0.70   < > 0.55*   CALCIUM mg/dL 8.4* 8.7 8.5*   < > 9.2   BILIRUBIN mg/dL 0.6  --  0.5  --  0.6   ALK PHOS U/L 63  --  67  --  74   ALT (SGPT) U/L 14  --  21  --  28   AST (SGOT) U/L 13  --  18  --  19   GLUCOSE mg/dL 116* 93 119*   < > 129*    < > = values in this interval not displayed.       Culture Data:   No results found for: \"BLOODCX\", \"URINECX\", \"WOUNDCX\", \"MRSACX\", \"RESPCX\", \"STOOLCX\"    Radiology Data:   Imaging Results (Last 24 Hours)       ** No results found for the last 24 hours. **            I have reviewed the patient's current medications.     Assessment/Plan   Assessment  Active Hospital Problems    Diagnosis     **Intractable pain     Common bile duct dilation     Hypertension     Hyperlipidemia     Intractable abdominal pain     Chronic cholecystitis with calculus     Irritable bowel syndrome with diarrhea     Heartburn     Belching        HPI . 69-year-old female patient with a medical history of diabetes mellitus type 2 and hypertension, presenting to the ED with intractable epigastric pain.  She has been having this abdominal pain for the last 3 days, and was associated with nausea and nonbloody vomiting.  She feels bloated, but is having normal bowel movements.  She denies any fever or chills.  She denies any chest pain shortness of breath.        Treatment Plan  Abdominal pain/common bile duct dilatation/cholecystitis.  Zosyn antibiotics.  General surgery consult.  CT abdomen pelvic- concern for a solitary radiodense gallstone in the region of the gallbladder neck by CT- not visible on the subsequent ultrasound- gallbladder is nondistended which suggests against acute cholecystitis-common bile duct is mildly dilated and there is a " mild intrahepatic biliary dilation as well- new when compared to the October 2019 CT scan- not see any obvious radiodense choledocholithiasis by CT- new mild biliary dilation would include choledocholithiasis or potentially sphincter of Oddi dysfunction-do not see any obstructing lesion at the level of the ampulla/pancreatic head.  Gallbladder ultrasound- No evidence of acute cholecystitis- Gallbladder is nondistended and  there is no wall thickening or visible gallstones, Liver steatosis.   EGD-Small hiatal hernia. - Erythematous mucosa in the gastric body. Biopsied. - Normal examined duodenum. Biopsied.  Colonoscopy- ileum was normal. - Diverticulosis in the sigmoid colon. - Non-bleeding external and internal hemorrhoids. - No specimens collected.  HIDA-Nonvisualization of the gallbladder at 4 hours- concerning for acute cholecystitis.     Hypokalemia.  Resolved..  Magnesium level is normal.     Small hiatal hernia hernia.  Protonix.  Zofran as needed.     Internal hemorrhoids.  Anusol suppository.     Sleep apnea.  Patient is on a CPAP machine at home.  Patient will bring it up to be used.  Patient is on room air..       Diabetes . Low-dose sliding scale.  Hemoglobin A1c 6.6.     Hypertension/hyperlipidemia.  Toprol.  Hold off statin due to possible cholecystitis.     Reflux.  Protonix.  Zofran as needed.     Pain .  Ultram as needed.  Toradol as needed.  Lidocaine patch.  Voltaren gel.  .     Lovenox prophylaxis.     Obesity.  BMI is 38.     Nutrition.  Regular diet/n.p.o. after midnight.     Pathology  Duodenum biopsies: Histologic normal small bowel mucosa. Active duodenitis is not present.  Gastric biopsies: Chronic superficial gastritis.     Medical Decision Making  Number and Complexity of problems: Abdomen pain.  Dilated gallbladder.  Differential Diagnosis: None.     Conditions and Status        Condition is unchanged.     [facility] Data  External documents reviewed: Previous note.  Cardiac tracing (EKG,  telemetry) interpretation: No monitor.  Radiology interpretation: CT scan/ultrasound  Labs reviewed: Laboratory .   Any tests that were considered but not ordered: Laboratory in AM.     Decision rules/scores evaluated (example [id number], Wells, etc): None     Discussed with: Patient     Care Planning  Shared decision making: Patient  Code status and discussions: Full code     Disposition  Social Determinants of Health that impact treatment or disposition: From home  1 to 2 days        Electronically signed by Harshil Augustin MD, 12/19/24, 11:04 CST.

## 2024-12-19 NOTE — CONSULTS
"EP CONSULT NOTE    Subjective        History of Present Illness    EP Problems:  Paroxysmal atrial fibrillation    Cardiology Problems:  1.  Hypertension  2.  Hyperlipidemia    Medical Problems:  1.  Cholecystitis  2.  GERD  3.  Obesity      Sada Casey is a 69 y.o. female with problem list as above for whom EP is consulted regarding paroxysmal atrial fibrillation.  She was notably presented to the hospital with abdominal pain felt to be secondary to cholecystitis.  She was found to be in atrial fibrillation with RVR.  She has not had a previous diagnosis of atrial fibrillation though states that she has had episodes like this in the past that she can now associated with the atrial fibrillation.  She states that the episodes are usually relatively brief did cause some symptoms of fatigue and shortness of breath.  She had spontaneous return of sinus rhythm without need for intervention.    Objective   Vital Signs:  /79 (BP Location: Left arm, Patient Position: Sitting)   Pulse 70   Temp 97.8 °F (36.6 °C) (Oral)   Resp 18   Ht 160 cm (63\")   Wt 98.4 kg (217 lb)   SpO2 100%   BMI 38.44 kg/m²   Estimated body mass index is 38.44 kg/m² as calculated from the following:    Height as of this encounter: 160 cm (63\").    Weight as of this encounter: 98.4 kg (217 lb).      Physical Exam  Vitals reviewed.   Constitutional:       Appearance: She is obese.   Cardiovascular:      Rate and Rhythm: Normal rate and regular rhythm.      Pulses: Normal pulses.      Heart sounds: Normal heart sounds. No murmur heard.  Pulmonary:      Effort: Pulmonary effort is normal. No respiratory distress.      Breath sounds: Normal breath sounds.   Skin:     General: Skin is warm and dry.   Neurological:      General: No focal deficit present.      Mental Status: She is alert and oriented to person, place, and time.   Psychiatric:         Mood and Affect: Mood normal.         Judgment: Judgment normal.          Result Review :  The " following data was reviewed by: Lex Stoll MD on 12/14/2024:  CMP          12/16/2024    06:57 12/17/2024    02:37 12/18/2024    01:28   CMP   Glucose 103  119  93    BUN 12  10  10    Creatinine 0.85  0.70  0.69    EGFR 73.8  93.2  93.5    Sodium 139  140  138    Potassium 4.0  3.7  3.3    Chloride 106  104  103    Calcium 9.0  8.5  8.7    Total Protein  6.1     Albumin  3.3     Globulin  2.8     Total Bilirubin  0.5     Alkaline Phosphatase  67     AST (SGOT)  18     ALT (SGPT)  21     Albumin/Globulin Ratio  1.2     BUN/Creatinine Ratio 14.1  14.3  14.5    Anion Gap 9.0  11.0  11.0      CBC          12/16/2024    06:57 12/17/2024    04:45 12/18/2024    01:28   CBC   WBC 8.21  6.24  7.16    RBC 5.04  4.50  4.72    Hemoglobin 14.3  12.2  12.5    Hematocrit 43.6  38.8  41.0    MCV 86.5  86.2  86.9    MCH 28.4  27.1  26.5    MCHC 32.8  31.4  30.5    RDW 12.8  13.5  13.5    Platelets 252  202  214      TSH          12/17/2024    02:37   TSH   TSH 1.280          [id number] SCORE   No data recorded         Assessment and Plan   Problems:  Paroxysmal atrial fibrillation with acute exacerbation    Sada Casey is a 69 y.o. female with problem list as above for whom EP is consulted regarding paroxysmal atrial fibrillation.  She has a new diagnosis of atrial fibrillation though has likely had episodes before based on symptoms.  She does warrant long-term anticoagulation given her elevated [id number], however we will hold off on this for the time being given her upcoming abdominal surgery.  For now, since she is in sinus rhythm, we will plan to start her on flecainide.  Long-term, given her young age, I do think that she is going to benefit from long-term rhythm control with ablation as well as lifestyle risk factor modification.    Plan:  -Short-term add flecainide 50 mg twice daily  -Continue metoprolol at current dose  -Plan for anticoagulation once recovered from abdominal surgery  -Long-term plans for ablation  as an outpatient  -Lifestyle risk factor modification for secondary prophylaxis of atrial fibrillation                     Part of this note may be an electronic transcription/translation of spoken language to printed text using the Dragon Dictation System.

## 2024-12-19 NOTE — ANESTHESIA PROCEDURE NOTES
Airway  Urgency: elective    Date/Time: 12/19/2024 2:15 PM  Airway not difficult    General Information and Staff    Patient location during procedure: OR  CRNA/CAA: Waldo Strong CRNA    Indications and Patient Condition  Indications for airway management: airway protection    Preoxygenated: yes  Mask difficulty assessment: 1 - vent by mask    Final Airway Details  Final airway type: endotracheal airway      Successful airway: ETT  Cuffed: yes   Successful intubation technique: direct laryngoscopy  Blade: Lee  Blade size: 2  ETT size (mm): 7.5  Cormack-Lehane Classification: grade I - full view of glottis  Placement verified by: chest auscultation and capnometry   Measured from: lips  ETT/EBT  to lips (cm): 21  Number of attempts at approach: 1  Assessment: lips, teeth, and gum same as pre-op and atraumatic intubation

## 2024-12-20 VITALS
TEMPERATURE: 98.7 F | HEART RATE: 67 BPM | BODY MASS INDEX: 38.45 KG/M2 | DIASTOLIC BLOOD PRESSURE: 59 MMHG | HEIGHT: 63 IN | RESPIRATION RATE: 18 BRPM | WEIGHT: 217 LBS | SYSTOLIC BLOOD PRESSURE: 142 MMHG | OXYGEN SATURATION: 95 %

## 2024-12-20 LAB
ALBUMIN SERPL-MCNC: 3.4 G/DL (ref 3.5–5.2)
ALBUMIN/GLOB SERPL: 1.2 G/DL
ALP SERPL-CCNC: 86 U/L (ref 39–117)
ALT SERPL W P-5'-P-CCNC: 42 U/L (ref 1–33)
ANION GAP SERPL CALCULATED.3IONS-SCNC: 10 MMOL/L (ref 5–15)
AST SERPL-CCNC: 54 U/L (ref 1–32)
BASOPHILS # BLD AUTO: 0.02 10*3/MM3 (ref 0–0.2)
BASOPHILS NFR BLD AUTO: 0.2 % (ref 0–1.5)
BILIRUB SERPL-MCNC: 0.4 MG/DL (ref 0–1.2)
BUN SERPL-MCNC: 12 MG/DL (ref 8–23)
BUN/CREAT SERPL: 22.6 (ref 7–25)
CALCIUM SPEC-SCNC: 8.7 MG/DL (ref 8.6–10.5)
CHLORIDE SERPL-SCNC: 103 MMOL/L (ref 98–107)
CO2 SERPL-SCNC: 24 MMOL/L (ref 22–29)
CREAT SERPL-MCNC: 0.53 MG/DL (ref 0.57–1)
CYTO UR: NORMAL
DEPRECATED RDW RBC AUTO: 41.1 FL (ref 37–54)
EGFRCR SERPLBLD CKD-EPI 2021: 99.6 ML/MIN/1.73
EOSINOPHIL # BLD AUTO: 0 10*3/MM3 (ref 0–0.4)
EOSINOPHIL NFR BLD AUTO: 0 % (ref 0.3–6.2)
ERYTHROCYTE [DISTWIDTH] IN BLOOD BY AUTOMATED COUNT: 13.1 % (ref 12.3–15.4)
GLOBULIN UR ELPH-MCNC: 2.9 GM/DL
GLUCOSE BLDC GLUCOMTR-MCNC: 111 MG/DL (ref 70–130)
GLUCOSE BLDC GLUCOMTR-MCNC: 130 MG/DL (ref 70–130)
GLUCOSE SERPL-MCNC: 144 MG/DL (ref 65–99)
HCT VFR BLD AUTO: 39.2 % (ref 34–46.6)
HGB BLD-MCNC: 12.1 G/DL (ref 12–15.9)
IMM GRANULOCYTES # BLD AUTO: 0.04 10*3/MM3 (ref 0–0.05)
IMM GRANULOCYTES NFR BLD AUTO: 0.4 % (ref 0–0.5)
LAB AP CASE REPORT: NORMAL
LAB AP DIAGNOSIS COMMENT: NORMAL
LYMPHOCYTES # BLD AUTO: 0.81 10*3/MM3 (ref 0.7–3.1)
LYMPHOCYTES NFR BLD AUTO: 8.5 % (ref 19.6–45.3)
Lab: NORMAL
Lab: NORMAL
MCH RBC QN AUTO: 26.6 PG (ref 26.6–33)
MCHC RBC AUTO-ENTMCNC: 30.9 G/DL (ref 31.5–35.7)
MCV RBC AUTO: 86.2 FL (ref 79–97)
MONOCYTES # BLD AUTO: 0.47 10*3/MM3 (ref 0.1–0.9)
MONOCYTES NFR BLD AUTO: 4.9 % (ref 5–12)
NEUTROPHILS NFR BLD AUTO: 8.17 10*3/MM3 (ref 1.7–7)
NEUTROPHILS NFR BLD AUTO: 86 % (ref 42.7–76)
NRBC BLD AUTO-RTO: 0 /100 WBC (ref 0–0.2)
PATH REPORT.FINAL DX SPEC: NORMAL
PATH REPORT.GROSS SPEC: NORMAL
PLATELET # BLD AUTO: 217 10*3/MM3 (ref 140–450)
PMV BLD AUTO: 9.4 FL (ref 6–12)
POTASSIUM SERPL-SCNC: 4.5 MMOL/L (ref 3.5–5.2)
PROT SERPL-MCNC: 6.3 G/DL (ref 6–8.5)
RBC # BLD AUTO: 4.55 10*6/MM3 (ref 3.77–5.28)
SODIUM SERPL-SCNC: 137 MMOL/L (ref 136–145)
WBC NRBC COR # BLD AUTO: 9.51 10*3/MM3 (ref 3.4–10.8)

## 2024-12-20 PROCEDURE — 80053 COMPREHEN METABOLIC PANEL: CPT | Performed by: SURGERY

## 2024-12-20 PROCEDURE — 25010000002 KETOROLAC TROMETHAMINE PER 15 MG: Performed by: SURGERY

## 2024-12-20 PROCEDURE — 25010000002 ENOXAPARIN PER 10 MG: Performed by: SURGERY

## 2024-12-20 PROCEDURE — 85025 COMPLETE CBC W/AUTO DIFF WBC: CPT | Performed by: SURGERY

## 2024-12-20 PROCEDURE — G0378 HOSPITAL OBSERVATION PER HR: HCPCS

## 2024-12-20 PROCEDURE — 82948 REAGENT STRIP/BLOOD GLUCOSE: CPT

## 2024-12-20 PROCEDURE — 25010000002 AMPICILLIN-SULBACTAM PER 1.5 G: Performed by: SURGERY

## 2024-12-20 RX ORDER — IRBESARTAN 300 MG/1
150 TABLET ORAL DAILY
Start: 2024-12-20

## 2024-12-20 RX ORDER — METOPROLOL SUCCINATE 50 MG/1
50 TABLET, EXTENDED RELEASE ORAL DAILY
Qty: 90 TABLET | Refills: 0 | Status: SHIPPED | OUTPATIENT
Start: 2024-12-21

## 2024-12-20 RX ORDER — HYDROCORTISONE ACETATE 25 MG/1
25 SUPPOSITORY RECTAL NIGHTLY PRN
Qty: 30 SUPPOSITORY | Refills: 0 | Status: SHIPPED | OUTPATIENT
Start: 2024-12-20

## 2024-12-20 RX ORDER — FLECAINIDE ACETATE 50 MG/1
25 TABLET ORAL EVERY 12 HOURS SCHEDULED
Qty: 90 TABLET | Refills: 0 | Status: SHIPPED | OUTPATIENT
Start: 2024-12-20

## 2024-12-20 RX ADMIN — ENOXAPARIN SODIUM 40 MG: 100 INJECTION SUBCUTANEOUS at 09:02

## 2024-12-20 RX ADMIN — FLECAINIDE ACETATE 25 MG: 50 TABLET ORAL at 09:45

## 2024-12-20 RX ADMIN — PANTOPRAZOLE SODIUM 40 MG: 40 TABLET, DELAYED RELEASE ORAL at 09:02

## 2024-12-20 RX ADMIN — Medication 10 ML: at 09:03

## 2024-12-20 RX ADMIN — TRAMADOL HYDROCHLORIDE 25 MG: 50 TABLET, COATED ORAL at 12:39

## 2024-12-20 RX ADMIN — AMPICILLIN SODIUM, SULBACTAM SODIUM 3 G: 2; 1 INJECTION, POWDER, FOR SOLUTION INTRAMUSCULAR; INTRAVENOUS at 03:01

## 2024-12-20 RX ADMIN — METOPROLOL SUCCINATE 50 MG: 50 TABLET, FILM COATED, EXTENDED RELEASE ORAL at 09:02

## 2024-12-20 RX ADMIN — KETOROLAC TROMETHAMINE 15 MG: 15 INJECTION, SOLUTION INTRAMUSCULAR; INTRAVENOUS at 04:00

## 2024-12-20 NOTE — PLAN OF CARE
Goal Outcome Evaluation:  Plan of Care Reviewed With: patient, family        Progress: improving  Outcome Evaluation: A&OX4, VSS. Lap levy today, MARIANGEL placed to RLQ, Lap x3, CDI. On room air, IV ABX, regular diet. No c/o pain at this time, voiding, Regular diet. Lovenox. Call light in reach, safety maintained

## 2024-12-20 NOTE — PLAN OF CARE
9/28/2012.  On warfarin.    Goal Outcome Evaluation:  Plan of Care Reviewed With: patient  Progress: improving       Pt medicated with prn Toradol x2 thus far this shift. IV abx infused per order, otherwise IV saline locked. Lap sites intact. MARIANGEL x1 Up with SBA. Voiding per BRP. Tolerating diet. VSS. Safety maintained.

## 2024-12-20 NOTE — ANESTHESIA POSTPROCEDURE EVALUATION
"Patient: Sada Casey    Procedure Summary       Date: 12/19/24 Room / Location: [facility] OR  /  [facility] OR    Anesthesia Start: 1411 Anesthesia Stop: 1539    Procedure: LAPAROSCOPIC CHOLECYSTECTOMY WITH DAVINCI ROBOT AND CHOLANGIOGRAM (Abdomen) Diagnosis:       Chronic cholecystitis      (Chronic cholecystitis [K81.1])    Surgeons: Rao Iglesias MD Provider: Waldo Strong CRNA    Anesthesia Type: general ASA Status: 3            Anesthesia Type: general    Vitals  Vitals Value Taken Time   /66 12/19/24 1725   Temp 97.9 °F (36.6 °C) 12/19/24 1725   Pulse 63 12/19/24 1734   Resp 14 12/19/24 1725   SpO2 97 % 12/19/24 1734   Vitals shown include unfiled device data.        Post Anesthesia Care and Evaluation    Patient location during evaluation: PACU  Patient participation: complete - patient participated  Level of consciousness: awake and awake and alert  Pain score: 0  Pain management: adequate    Airway patency: patent  Anesthetic complications: No anesthetic complications  PONV Status: none  Cardiovascular status: acceptable  Respiratory status: acceptable  Hydration status: acceptable    Comments: Patient discharged according to acceptable Juan score per RN assessment. See nursing records for further information.     Blood pressure 139/57, pulse 64, temperature 98.3 °F (36.8 °C), temperature source Oral, resp. rate 18, height 160 cm (63\"), weight 98.4 kg (217 lb), SpO2 99%, not currently breastfeeding.      "

## 2024-12-20 NOTE — CASE MANAGEMENT/SOCIAL WORK
Continued Stay Note   Rosa Isela     Patient Name: Sada Casey  MRN: [id number]  Today's Date: 12/20/2024    Admit Date: 12/14/2024    Plan: Home   Discharge Plan       Row Name 12/20/24 1051       Plan    Plan Home    Patient/Family in Agreement with Plan yes    Plan Comments Pt lives at home alone and plans to return home at d/c. No needs identified at this time.                   Discharge Codes    No documentation.                       JOSEPHINE Calvert

## 2024-12-20 NOTE — DISCHARGE SUMMARY
[facility] Medicine Services  DISCHARGE SUMMARY       Date of Admission: 12/14/2024  Date of Discharge:  12/20/2024  Primary Care Physician: Jennifer Hummel DO    Presenting Problem/History of Present Illness:      Final Discharge Diagnoses:  Active Hospital Problems    Diagnosis     **Intractable pain     Common bile duct dilation     Hypertension     Hyperlipidemia     Intractable abdominal pain     Chronic cholecystitis with calculus     Irritable bowel syndrome with diarrhea     Heartburn     Belching        Consults: EP.  General surgery . GI.    Procedures Performed:   PROCEDURE PERFORMED: Robotic cholecystectomy with intraoperative cholangiogram      EGD .  Small hiatal hernia. - Erythematous mucosa in the gastric body. Biopsied. - Normal examined duodenum. Biopsied    Colonoscopy  The terminal ileum appeared normal. Multiple medium-mouthed and small-mouthed diverticula were found in the sigmoid colon.    Pertinent Test Results:   Results for orders placed during the hospital encounter of 12/14/24    Adult Transthoracic Echo Complete w/ Color, Spectral and Contrast if Necessary Per Protocol    Interpretation Summary    Left ventricular wall thickness is consistent with mild concentric hypertrophy.    Left ventricular diastolic function is consistent with (grade I) impaired relaxation and age.    Estimated right ventricular systolic pressure from tricuspid regurgitation is normal (<35 mmHg).    The right ventricular cavity is borderline dilated, with normal systolic function.    No significant valvular pathology.    No previous exams are available for comparison.      Imaging Results (All)       Procedure Component Value Units Date/Time    XR Abdomen KUB [[id number]] Collected: 12/19/24 1620     Updated: 12/19/24 1624    Narrative:      EXAMINATION: XR ABDOMEN KUB- 12/19/2024 4:20 PM     HISTORY: Laprascopic Cholecystectomy with DaVinci Robot and  Cholangiogram;  R10.9-Unspecified abdominal pain; K81.1-Chronic  cholecystitis; K83.8-Other specified diseases of biliary tract;  K80.10-Calculus of gallbladder with chronic cholecystitis without  obstruction.     REPORT:     Fluoroscopy time: 9.9 seconds     Radiation dose: 1.2580 Gycm2 (Air Kerma).     Number of fluoro spot images obtained: 1.     COMPARISON: CT abdomen pelvis 12/14/2024.     Intraoperative or intra-procedural fluoroscopic spot films of the right  upper quadrant were obtained under the supervision of the General  surgery, no radiologist was present for image acquisition. The  cholangiogram demonstrates patency of the ampulla, no significant  biliary ductal dilatation, though there is smooth narrowing and possible  stricture involving the proximal common bile duct. No definite filling  defects are seen within the bile ducts. Please see the operative or  procedural notes for details.        This report was signed and finalized on 12/19/2024 4:21 PM by Dr. Mani Schreiber MD.       FL C Arm During Surgery [[id number]] Resulted: 12/19/24 1604     Updated: 12/19/24 1604    Narrative:      This procedure was auto-finalized with no dictation required.    NM HIDA SCAN WITHOUT PHARMACOLOGICAL INTERVENTION [[id number]] Collected: 12/17/24 1205     Updated: 12/17/24 1524    Narrative:      EXAM: NM HIDA SCAN WITHOUT PHARMACOLOGICAL INTERVENTION- - 12/17/2024  9:06 AM     HISTORY: abd pain; R10.9-Unspecified abdominal pain       COMPARISON: 12/15/2024     TECHNIQUE:    5.0 mCi of Tc-99m Mebrofenin was administered intravenously and serial  anterior planar images over the liver were obtained. 30 mL corn oil  emulsion was administered orally with subsequent imaging per protocol.     FINDINGS:   Homogenous activity throughout the liver with good clearance of  background activity. This indicates good hepatocellular function.      Biliary tree activity is seen at 15 minutes. The gallbladder is not  visualized on 4-hour delay  image. Bowel activity is seen at 25 minutes.          Impression:      1. Nonvisualization of the gallbladder at 4 hours, concerning for acute  cholecystitis.     Result communicated by telephone Jolly Stroud patient's nurse at 3:19 PM  on 12/17/2024.      This report was signed and finalized on 12/17/2024 3:21 PM by Dr Crystal Gilbert MD.       CT Abdomen Pelvis With Contrast [[id number]] Collected: 12/15/24 0936     Updated: 12/15/24 0951    Narrative:      CT ABDOMEN PELVIS W CONTRAST- 12/14/2024 10:41 PM     HISTORY: R sided abd pain x 3 days.       COMPARISON: CT scan dated 10/20/2019.     DOSE LENGTH PRODUCT: 722.69 mGy.cm. Automated exposure control was also  utilized to decrease patient radiation dose.     TECHNIQUE: Following the intravenous administration of contrast, helical  CT tomographic images of the abdomen and pelvis were acquired.  Multiplanar reformatted images were provided for review.     FINDINGS:  LOWER CHEST: The lung bases are clear. Included mediastinal structures  are unremarkable.     LIVER: Hepatomegaly with diffuse steatosis. Portal and hepatic veins are  patent. No focal lesion.     BILIARY SYSTEM: Gallbladder is nondistended. On axial image #23 there is  suspicion of a solitary gallstone at the gallbladder neck, also seen on  sagittal image #60. No overt gallbladder wall thickening. The common  bile duct is mildly dilated for age, measuring up to 9 mm in diameter.  There is mild intrahepatic biliary dilation as well which is new.     PANCREAS: No focal pancreatic lesion identified.      SPLEEN: Unremarkable.     KIDNEYS AND ADRENALS: Adrenal glands are unremarkable. Kidneys are  unremarkable. The ureters are decompressed and normal in appearance.     RETROPERITONEUM: No mass, lymphadenopathy or hemorrhage.     GI TRACT: The stomach is nondistended. Small bowel loops are nondilated.   No inflammatory changes are seen along the colon. The appendix is  visualized and unremarkable.      OTHER: There is no mesenteric mass, lymphadenopathy, free fluid or free  air. The abdominal vascular structures are patent. No acute bony  abnormality. Lumbar facet arthropathy. L4-L5 level degenerative  anterolisthesis. Tiny fat-containing left inguinal hernia.     PELVIS: No mass lesion, fluid collection or significant lymphadenopathy  is seen in the pelvis. The urinary bladder is normal in appearance.  Prior hysterectomy.       Impression:         1.  There is concern for a solitary radiodense gallstone in the region  of the gallbladder neck by CT. This was not visible on the subsequent  ultrasound. The gallbladder is nondistended which suggests against acute  cholecystitis. The common bile duct is mildly dilated and there is a  mild intrahepatic biliary dilation as well. This is new when compared to  the October 2019 CT scan. I do not see any obvious radiodense  choledocholithiasis by CT. Differential for this new mild biliary  dilation would include choledocholithiasis or potentially sphincter of  Oddi dysfunction. I do not see any obstructing lesion at the level of  the ampulla/pancreatic head.           This report was signed and finalized on 12/15/2024 9:48 AM by Dr Everton Ly.       US Gallbladder [[id number]] Collected: 12/15/24 0829     Updated: 12/15/24 0835    Narrative:      US GALLBLADDER-12/15/2024 12:18 AM     REASON FOR EXAM: CT concerning for acute cholecystitis.       COMPARISON: CT scan dated 12/14/2024     TECHNIQUE: Multiple longitudinal and transverse realtime sonographic  images of the right upper quadrant of the abdomen are obtained.  Ultrasound images and report are permanently stored in the PACS.     FINDINGS:    Pancreas: Included portions of the pancreatic head and neck are  unremarkable. The body and tail of the pancreas are obscured from view.     Liver: There are portions of the liver which are poorly assessed. There  is some underlying steatosis identified. No obvious focal  liver lesion.  Portal veins are patent with hepatopetal flow.     Gallbladder: The gallbladder is nondistended. No gallstones identified.  No abnormal wall thickening identified.      Bile ducts: The CBD measures 0.6 cm in diameter. There is no  intrahepatic or extrahepatic ductal dilation.     Right kidney: Normal in size, shape and contour. No mass, hydronephrosis  or calculi. No perinephric fluid collection.       Other: The visualized abdominal aorta is normal in caliber.       Impression:      1. No evidence of acute cholecystitis. Gallbladder is nondistended and  there is no wall thickening or visible gallstones.  2. Liver steatosis.  3. These findings are in agreement with the critical and emergent  findings from the [facility] preliminary report.           This report was signed and finalized on 12/15/2024 8:32 AM by Dr Everton Ly.             LAB RESULTS:      Lab 12/20/24  0319 12/19/24  0328 12/18/24  0128 12/17/24  0445 12/16/24  0657 12/15/24  0825 12/14/24  2253   WBC 9.51 6.44 7.16 6.24 8.21   < > 9.29   HEMOGLOBIN 12.1 11.5* 12.5 12.2 14.3   < > 14.1   HEMATOCRIT 39.2 38.0 41.0 38.8 43.6   < > 43.7   PLATELETS 217 216 214 202 252   < > 237   NEUTROS ABS 8.17* 3.44  --  3.60 5.62  --  7.22*   IMMATURE GRANS (ABS) 0.04 0.02  --  0.01 0.03  --  0.05   LYMPHS ABS 0.81 2.20  --  2.03 1.88  --  1.63   MONOS ABS 0.47 0.55  --  0.47 0.60  --  0.35   EOS ABS 0.00 0.19  --  0.11 0.05  --  0.01   MCV 86.2 87.4 86.9 86.2 86.5   < > 82.6    < > = values in this interval not displayed.         Lab 12/20/24  0319 12/19/24  0328 12/18/24  0128 12/17/24  0237 12/16/24  0657 12/15/24  0826   SODIUM 137 140 138 140 139  --    POTASSIUM 4.5 4.0 3.3* 3.7 4.0  --    CHLORIDE 103 106 103 104 106  --    CO2 24.0 25.0 24.0 25.0 24.0  --    ANION GAP 10.0 9.0 11.0 11.0 9.0  --    BUN 12 15 10 10 12  --    CREATININE 0.53* 0.80 0.69 0.70 0.85  --    EGFR 99.6 79.4 93.5 93.2 73.8  --    GLUCOSE 144* 116* 93 119* 103*  --     CALCIUM 8.7 8.4* 8.7 8.5* 9.0  --    MAGNESIUM  --   --  2.1  --   --   --    HEMOGLOBIN A1C  --   --   --   --   --  6.60*   TSH  --   --   --  1.280  --   --          Lab 12/20/24  0319 12/19/24  0328 12/17/24  0237 12/15/24  0825 12/14/24  2253   TOTAL PROTEIN 6.3 5.9* 6.1 7.0 7.0   ALBUMIN 3.4* 3.2* 3.3* 3.9 3.8   GLOBULIN 2.9 2.7 2.8 3.1 3.2   ALT (SGPT) 42* 14 21 28 31   AST (SGOT) 54* 13 18 19 23   BILIRUBIN 0.4 0.6 0.5 0.6 0.5   ALK PHOS 86 63 67 74 78   LIPASE  --   --   --   --  25         Lab 12/15/24  0056 12/14/24  2253   HSTROP T 7 7         Lab 12/17/24  0237   CHOLESTEROL 169   LDL CHOL 103*   HDL CHOL 31*   TRIGLYCERIDES 200*             Brief Urine Lab Results  (Last result in the past 365 days)        Color   Clarity   Blood   Leuk Est   Nitrite   Protein   CREAT   Urine HCG        12/14/24 2343 Yellow   Clear   Negative   Trace   Negative   Negative                 Microbiology Results (last 10 days)       ** No results found for the last 240 hours. **        HPI . 69-year-old female patient with a medical history of diabetes mellitus type 2 and hypertension, presenting to the ED with intractable epigastric pain.  She has been having this abdominal pain for the last 3 days, and was associated with nausea and nonbloody vomiting.  She feels bloated, but is having normal bowel movements.  She denies any fever or chills.  She denies any chest pain shortness of breath.     Hospital Course:   Abdominal pain/common bile duct dilatation/cholecystitis.  Zosyn antibiotics.  General surgery consult.  CT abdomen pelvic- concern for a solitary radiodense gallstone in the region of the gallbladder neck by CT- not visible on the subsequent ultrasound- gallbladder is nondistended which suggests against acute cholecystitis-common bile duct is mildly dilated and there is a mild intrahepatic biliary dilation as well- new when compared to the October 2019 CT scan- not see any obvious radiodense choledocholithiasis  by CT- new mild biliary dilation would include choledocholithiasis or potentially sphincter of Oddi dysfunction-do not see any obstructing lesion at the level of the ampulla/pancreatic head.  Gallbladder ultrasound- No evidence of acute cholecystitis- Gallbladder is nondistended and  there is no wall thickening or visible gallstones, Liver steatosis.   EGD-Small hiatal hernia. - Erythematous mucosa in the gastric body. Biopsied. - Normal examined duodenum. Biopsied.  Colonoscopy- ileum was normal. - Diverticulosis in the sigmoid colon. - Non-bleeding external and internal hemorrhoids. - No specimens collected.  HIDA-Nonvisualization of the gallbladder at 4 hours- concerning for acute cholecystitis.  Status postcholecystectomy 12/20/2024.  Pathology report from EGD-  Final Diagnosis   1.  Duodenum biopsies:  Histologic normal small bowel mucosa.  Active duodenitis is not present.     2.  Gastric biopsies:  Chronic superficial gastritis.     Hypokalemia.  Resolved..  Magnesium level is normal.     Small hiatal hernia hernia.  Protonix.  Zofran as needed.     Internal hemorrhoids.  Anusol suppository.     Sleep apnea.  Patient is on a CPAP machine at home.  Patient will bring it up to be used.  Patient is on room air..       Diabetes . Low-dose sliding scale.  Hemoglobin A1c 6.6.     Hypertension/hyperlipidemia.  Toprol.  Hold off statin due to possible cholecystitis.     Reflux.  Protonix.  Zofran as needed.     Pain .  Ultram as needed.  Toradol as needed.  Lidocaine patch.  Voltaren gel.  .     Lovenox prophylaxis.     Obesity.  BMI is 38.     Nutrition.  Regular diet/n.p.o. after midnight.     Blood pressure stable, afebrile.  Plan to discharge patient home today.  Follow with PCP 1 week.  Follow-up with general surgery patient has appointment next week.    Physical Exam on Discharge:  /65 (BP Location: Left arm, Patient Position: Lying)   Pulse 67   Temp 97.8 °F (36.6 °C) (Oral)   Resp 18   Ht 160 cm  "(63\")   Wt 98.4 kg (217 lb)   SpO2 94%   BMI 38.44 kg/m²   Physical Exam  Vitals and nursing note reviewed.   Constitutional:       General: She is not in acute distress.     Appearance: She is not toxic-appearing.   HENT:      Head: Normocephalic and atraumatic.      Mouth/Throat:      Mouth: Mucous membranes are moist.      Pharynx: Oropharynx is clear.   Eyes:      Extraocular Movements: Extraocular movements intact.      Conjunctiva/sclera: Conjunctivae normal.      Pupils: Pupils are equal, round, and reactive to light.   Cardiovascular:      Rate and Rhythm: Normal rate and regular rhythm.      Pulses: Normal pulses.      Heart sounds: No murmur heard.  Pulmonary:      Effort: Pulmonary effort is normal. No respiratory distress.      Breath sounds: Normal breath sounds. No wheezing or rhonchi.   Abdominal:      General: Bowel sounds are normal. There is no distension.      Palpations: Abdomen is soft.      Tenderness: There is no abdominal tenderness.      Comments: Obesity.   Musculoskeletal:         General: No swelling or tenderness. Normal range of motion.      Cervical back: Normal range of motion and neck supple. No muscular tenderness.   Skin:     General: Skin is warm and dry.      Capillary Refill: Capillary refill takes 2 to 3 seconds.      Findings: No erythema or rash.   Neurological:      General: No focal deficit present.      Mental Status: She is alert and oriented to person, place, and time.      Cranial Nerves: No cranial nerve deficit.      Motor: No weakness.   Psychiatric:         Mood and Affect: Mood normal.         Behavior: Behavior normal.     Condition on Discharge: Stable.    Discharge Disposition: Discharge home with daughter.  Home or Self Care    Discharge Medications:     Discharge Medications        New Medications        Instructions Start Date   apixaban 5 MG tablet tablet  Commonly known as: ELIQUIS   5 mg, Oral, Every 12 Hours Scheduled      cyclobenzaprine 5 MG " tablet  Commonly known as: FLEXERIL   5 mg, Oral, Every 8 Hours PRN      Diclofenac Sodium 1 % gel gel  Commonly known as: VOLTAREN   4 g, Topical, 4 Times Daily      flecainide 50 MG tablet  Commonly known as: TAMBOCOR   25 mg, Oral, Every 12 Hours Scheduled      hydrocortisone 25 MG suppository  Commonly known as: ANUSOL-HC   25 mg, Rectal, Nightly PRN      oxyCODONE 5 MG immediate release tablet  Commonly known as: Roxicodone   5 mg, Oral, Every 6 Hours PRN             Changes to Medications        Instructions Start Date   irbesartan 300 MG tablet  Commonly known as: AVAPRO  What changed: how much to take   150 mg, Oral, Daily      metoprolol succinate XL 50 MG 24 hr tablet  Commonly known as: TOPROL-XL  What changed:   medication strength  how much to take  when to take this   50 mg, Oral, Daily   Start Date: December 21, 2024            Continue These Medications        Instructions Start Date   celecoxib 200 MG capsule  Commonly known as: CeleBREX   200 mg, Oral, 2 Times Daily      cholecalciferol 25 MCG (1000 UT) tablet  Commonly known as: VITAMIN D3   1,000 Units, Oral, Daily      citalopram 20 MG tablet  Commonly known as: CeleXA   20 mg, Oral, Daily      dicyclomine 10 MG capsule  Commonly known as: BENTYL   10 mg, Oral, 4 Times Daily Before Meals & Nightly      ezetimibe 10 MG tablet  Commonly known as: ZETIA   10 mg, Oral, Daily      fluticasone 50 MCG/ACT nasal spray  Commonly known as: FLONASE   2 sprays, Nasal, As Needed      loperamide 2 MG tablet  Commonly known as: Imodium A-D   2 mg, Oral, 4 Times Daily PRN      pantoprazole 40 MG EC tablet  Commonly known as: PROTONIX   40 mg, Oral, Daily             Stop These Medications      hydroCHLOROthiazide 12.5 MG capsule  Commonly known as: MICROZIDE                Discharge Diet:   Diet Instructions       Advance Diet As Tolerated -Target Diet: Regular diet.      Target Diet: Regular diet.            Activity at Discharge:   Activity Instructions        Activity as Tolerated              Follow-up Appointments:   Future Appointments   Date Time Provider Department Center   1/21/2025  1:30 PM Karen Quintero, PA [facility]     Follow with PCP 1 week.  Follow with general surgery next week.    Electronically signed by Harshil Augustin MD, 12/20/24, 11:31 CST.    Time: Greater than 30 minutes.

## 2024-12-23 ENCOUNTER — CLINICAL SUPPORT (OUTPATIENT)
Dept: SURGERY | Facility: CLINIC | Age: 69
End: 2024-12-23
Payer: MEDICARE

## 2024-12-23 NOTE — PROGRESS NOTES
Only 20 CC out from drain last night. 5 CC out this AM. Removed MARIANGEL drain. Pt tolerated well.

## 2024-12-24 LAB
QT INTERVAL: 330 MS
QTC INTERVAL: 470 MS

## 2025-01-21 ENCOUNTER — OFFICE VISIT (OUTPATIENT)
Dept: CARDIOLOGY | Facility: CLINIC | Age: 70
End: 2025-01-21
Payer: MEDICARE

## 2025-01-21 VITALS
WEIGHT: 211 LBS | OXYGEN SATURATION: 97 % | HEIGHT: 63 IN | SYSTOLIC BLOOD PRESSURE: 102 MMHG | DIASTOLIC BLOOD PRESSURE: 74 MMHG | HEART RATE: 69 BPM | BODY MASS INDEX: 37.39 KG/M2

## 2025-01-21 DIAGNOSIS — I48.0 PAF (PAROXYSMAL ATRIAL FIBRILLATION): Primary | ICD-10-CM

## 2025-01-21 NOTE — PROGRESS NOTES
"[facility] -  CLINIC FOLLOW UP     Patient Care Team:  Jennifer Hummel DO as PCP - General (Family Medicine)  Shawnee Marin MD as Consulting Physician (Gastroenterology)    Chief Complaint: follow up to palpitations     Subjective   EP Problems:  Paroxysmal atrial fibrillation     Cardiology Problems:  1.  Hypertension  2.  Hyperlipidemia     Medical Problems:  1.  Cholecystitis  2.  GERD  3.  Obesity  4. UDAY with CPAP      HPI: Today I had the pleasure of seeing Sada Casey in the cardiology clinic for follow up. She was notably presented to the hospital with abdominal pain felt to be secondary to cholecystitis. She was found to be in atrial fibrillation with RVR. She has not had a previous diagnosis of atrial fibrillation though states that she has had episodes like this in the past that she can now associate with the atrial fibrillation. She was started on flecainide 50 mg twice a day in the hospital but for some reason her dose was decreased to 25 mg twice a day upon discharge. Patient states that since starting flecainide she does not feel like the palpitations have improved.     Objective     Visit Vitals  /74   Pulse 69   Ht 160 cm (62.99\")   Wt 95.7 kg (211 lb)   SpO2 97%   BMI 37.39 kg/m²           Vitals reviewed.   Constitutional:       Appearance: Not in distress.   Eyes:      Extraocular Movements: Extraocular movements intact.      Conjunctiva/sclera: Conjunctivae normal.      Pupils: Pupils are equal, round, and reactive to light.   HENT:      Head: Normocephalic and atraumatic.      Nose: Nose normal.    Mouth/Throat:      Lips: Pink.      Mouth: Mucous membranes are moist.      Pharynx: Oropharynx is clear.   Neck:      Vascular: No carotid bruit or JVD. JVD normal.   Pulmonary:      Effort: Pulmonary effort is normal.      Breath sounds: Normal breath sounds.   Chest:      Chest wall: Not tender to palpatation.   Cardiovascular:      PMI at left " midclavicular line. Normal rate. Regular rhythm. Normal S1. Normal S2.       Murmurs: There is no murmur.      No gallop.  No rub.   Pulses:     Radial: 2+ bilaterally.  Edema:     Peripheral edema absent.   Musculoskeletal:      Extremities: No clubbing present.Skin:     General: Skin is warm and dry.   Neurological:      General: No focal deficit present.      Mental Status: Alert and oriented to person, place, and time.   Psychiatric:         Attention and Perception: Attention normal.         Mood and Affect: Affect normal.         Speech: Speech normal.         Behavior: Behavior normal.         Cognition and Memory: Cognition normal.             The following portions of the patient's history were reviewed and updated as appropriate: allergies, current medications, past medical history, past social history, past and problem list.     Review of Systems   Constitutional: Negative.    HENT: Negative.     Eyes: Negative.    Respiratory: Negative.     Cardiovascular: Negative.    Gastrointestinal: Negative.    Endocrine: Negative.    Genitourinary: Negative.    Musculoskeletal: Negative.    Skin: Negative.    Allergic/Immunologic: Negative.    Neurological: Negative.    Hematological: Negative.    Psychiatric/Behavioral: Negative.                ECG 12 Lead    Date/Time: 1/21/2025 1:52 PM  Performed by: Karen Quintero PA    Authorized by: Karen Quintero PA  Comparison: compared with previous ECG from 12/16/2024  Rhythm: sinus rhythm  Ectopy: atrial premature contractions  Rate: normal  BPM: 68          Medication Review: yes    Current Outpatient Medications:     apixaban (ELIQUIS) 5 MG tablet tablet, Take 1 tablet by mouth Every 12 (Twelve) Hours. Indications: Atrial Fibrillation, Disp: 60 tablet, Rfl: 2    celecoxib (CeleBREX) 200 MG capsule, Take 1 capsule by mouth 2 (Two) Times a Day., Disp: , Rfl:     Cholecalciferol 25 MCG (1000 UT) tablet, Take 1 tablet by mouth Daily., Disp: , Rfl:     citalopram  "(CeleXA) 20 MG tablet, Take 1 tablet by mouth Daily., Disp: , Rfl:     Diclofenac Sodium (VOLTAREN) 1 % gel gel, Apply 4 g topically to the appropriate area as directed 4 (Four) Times a Day., Disp: 350 g, Rfl: 0    dicyclomine (BENTYL) 10 MG capsule, Take 1 capsule by mouth 4 (Four) Times a Day Before Meals & at Bedtime., Disp: 360 capsule, Rfl: 3    ezetimibe (ZETIA) 10 MG tablet, Take 1 tablet by mouth Daily., Disp: , Rfl:     flecainide (TAMBOCOR) 50 MG tablet, Take 0.5 tablets by mouth Every 12 (Twelve) Hours., Disp: 90 tablet, Rfl: 0    fluticasone (FLONASE) 50 MCG/ACT nasal spray, Administer 2 sprays into the nostril(s) as directed by provider As Needed., Disp: , Rfl:     hydrocortisone (ANUSOL-HC) 25 MG suppository, Insert 1 suppository into the rectum At Night As Needed for Hemorrhoids., Disp: 30 suppository, Rfl: 0    irbesartan (AVAPRO) 300 MG tablet, Take 0.5 tablets by mouth Daily., Disp: , Rfl:     loperamide (Imodium A-D) 2 MG tablet, Take 1 tablet by mouth 4 (Four) Times a Day As Needed for Diarrhea., Disp: 120 tablet, Rfl: 12    metoprolol succinate XL (TOPROL-XL) 50 MG 24 hr tablet, Take 1 tablet by mouth Daily., Disp: 90 tablet, Rfl: 0    pantoprazole (PROTONIX) 40 MG EC tablet, Take 1 tablet by mouth Daily., Disp: 90 tablet, Rfl: 3   Allergies   Allergen Reactions    Nyquil Multi-Symptom [Pseudoeph-Doxylamine-Dm-Apap] Rash       I have reviewed       CBC:  Lab Results - Last 18 Months   Lab Units 12/20/24  0319   WBC 10*3/mm3 9.51   HEMOGLOBIN g/dL 12.1   HEMATOCRIT % 39.2   PLATELETS 10*3/mm3 217      BMP/CMP:  Lab Results - Last 18 Months   Lab Units 12/20/24  0319   SODIUM mmol/L 137   POTASSIUM mmol/L 4.5   CHLORIDE mmol/L 103   CO2 mmol/L 24.0   GLUCOSE mg/dL 144*   BUN mg/dL 12   CREATININE mg/dL 0.53*   CALCIUM mg/dL 8.7     BNP: No results for input(s): \"PROBNP\" in the last 47827 hours.   THYROID:  Lab Results - Last 18 Months   Lab Units 12/17/24  0237   TSH uIU/mL 1.280       Results for " orders placed during the hospital encounter of 12/14/24    Adult Transthoracic Echo Complete w/ Color, Spectral and Contrast if Necessary Per Protocol    Interpretation Summary    Left ventricular wall thickness is consistent with mild concentric hypertrophy.    Left ventricular diastolic function is consistent with (grade I) impaired relaxation and age.    Estimated right ventricular systolic pressure from tricuspid regurgitation is normal (<35 mmHg).    The right ventricular cavity is borderline dilated, with normal systolic function.    No significant valvular pathology.    No previous exams are available for comparison.     Assessment:   Diagnoses and all orders for this visit:    1. PAF (paroxysmal atrial fibrillation) (Primary)    Other orders  -     ECG 12 Lead    PAF: She still is experiencing palpitations and fluttering and overall doesn't feel any better. When she was discharged, she was advised to take flecainide 25mg BID on her DC summary, which is below recommended dosage.  - Increase dose of flecainide back to 50 mg BID.  - Continue eliquis.  - Continue Metoprolol.  - Follow up in 1-2 months.    I spent 30 minutes caring for Sada on this date of service. This time includes time spent by me in the following activities:preparing for the visit, reviewing tests, obtaining and/or reviewing a separately obtained history, performing a medically appropriate examination and/or evaluation , counseling and educating the patient/family/caregiver, ordering medications, tests, or procedures, referring and communicating with other health care professionals , documenting information in the medical record, and independently interpreting results and communicating that information with the patient/family/caregiver        Electronically signed by ISIDRO Monson

## 2025-03-05 ENCOUNTER — OFFICE VISIT (OUTPATIENT)
Dept: SURGERY | Age: 70
End: 2025-03-05
Payer: MEDICARE

## 2025-03-05 VITALS — HEART RATE: 68 BPM | OXYGEN SATURATION: 96 % | HEIGHT: 63 IN | WEIGHT: 210 LBS | BODY MASS INDEX: 37.21 KG/M2

## 2025-03-05 DIAGNOSIS — Z85.3 PERSONAL HISTORY OF MALIGNANT NEOPLASM OF BREAST: ICD-10-CM

## 2025-03-05 DIAGNOSIS — Z12.31 VISIT FOR SCREENING MAMMOGRAM: Primary | ICD-10-CM

## 2025-03-05 PROCEDURE — G8417 CALC BMI ABV UP PARAM F/U: HCPCS | Performed by: PHYSICIAN ASSISTANT

## 2025-03-05 PROCEDURE — G8427 DOCREV CUR MEDS BY ELIG CLIN: HCPCS | Performed by: PHYSICIAN ASSISTANT

## 2025-03-05 PROCEDURE — 1036F TOBACCO NON-USER: CPT | Performed by: PHYSICIAN ASSISTANT

## 2025-03-05 PROCEDURE — 99213 OFFICE O/P EST LOW 20 MIN: CPT | Performed by: PHYSICIAN ASSISTANT

## 2025-03-05 PROCEDURE — G8399 PT W/DXA RESULTS DOCUMENT: HCPCS | Performed by: PHYSICIAN ASSISTANT

## 2025-03-05 PROCEDURE — 3017F COLORECTAL CA SCREEN DOC REV: CPT | Performed by: PHYSICIAN ASSISTANT

## 2025-03-05 PROCEDURE — 1159F MED LIST DOCD IN RCRD: CPT | Performed by: PHYSICIAN ASSISTANT

## 2025-03-05 PROCEDURE — 1123F ACP DISCUSS/DSCN MKR DOCD: CPT | Performed by: PHYSICIAN ASSISTANT

## 2025-03-05 PROCEDURE — 1090F PRES/ABSN URINE INCON ASSESS: CPT | Performed by: PHYSICIAN ASSISTANT

## 2025-03-05 RX ORDER — EZETIMIBE 10 MG/1
TABLET ORAL
COMMUNITY
Start: 2025-01-08

## 2025-03-05 RX ORDER — DICYCLOMINE HYDROCHLORIDE 10 MG/1
10 CAPSULE ORAL
COMMUNITY
Start: 2024-11-21

## 2025-03-05 RX ORDER — FLECAINIDE ACETATE 50 MG/1
50 TABLET ORAL EVERY 12 HOURS SCHEDULED
COMMUNITY
Start: 2024-12-20

## 2025-03-05 RX ORDER — CELECOXIB 200 MG/1
CAPSULE ORAL
COMMUNITY
Start: 2024-12-23

## 2025-03-05 RX ORDER — METOPROLOL SUCCINATE 50 MG/1
TABLET, EXTENDED RELEASE ORAL
COMMUNITY
Start: 2025-03-05

## 2025-03-05 RX ORDER — DULOXETIN HYDROCHLORIDE 30 MG/1
30 CAPSULE, DELAYED RELEASE ORAL DAILY
COMMUNITY

## 2025-03-05 NOTE — PROGRESS NOTES
masses.  She has fibrocystic changes throughout both breast.  There is no appreciable skin dimpling.  There is no axillary adenopathy or supraclavicular adenopathy appreciable.      Impression  s/p right lumpectomy and IORT-12/30/2019     Plan  We will see her back next year for yearly exam and mammography        20 minutes was spent during this exam with face-to-face counseling, review of data and physical exam

## 2025-03-11 RX ORDER — FLECAINIDE ACETATE 50 MG/1
50 TABLET ORAL EVERY 12 HOURS SCHEDULED
Qty: 180 TABLET | Refills: 3 | Status: SHIPPED | OUTPATIENT
Start: 2025-03-11 | End: 2026-03-11

## 2025-04-15 ENCOUNTER — OFFICE VISIT (OUTPATIENT)
Dept: CARDIOLOGY | Facility: CLINIC | Age: 70
End: 2025-04-15
Payer: MEDICARE

## 2025-04-15 VITALS
HEART RATE: 61 BPM | HEIGHT: 63 IN | DIASTOLIC BLOOD PRESSURE: 76 MMHG | SYSTOLIC BLOOD PRESSURE: 138 MMHG | OXYGEN SATURATION: 97 % | BODY MASS INDEX: 37.92 KG/M2 | WEIGHT: 214 LBS

## 2025-04-15 DIAGNOSIS — I48.0 PAF (PAROXYSMAL ATRIAL FIBRILLATION): Primary | ICD-10-CM

## 2025-04-15 DIAGNOSIS — R00.2 PALPITATIONS: ICD-10-CM

## 2025-04-15 PROCEDURE — 99214 OFFICE O/P EST MOD 30 MIN: CPT

## 2025-04-15 PROCEDURE — 3078F DIAST BP <80 MM HG: CPT

## 2025-04-15 PROCEDURE — 3075F SYST BP GE 130 - 139MM HG: CPT

## 2025-04-15 PROCEDURE — 93000 ELECTROCARDIOGRAM COMPLETE: CPT

## 2025-04-15 RX ORDER — IRBESARTAN 300 MG/1
300 TABLET ORAL DAILY
Start: 2025-04-15

## 2025-04-15 NOTE — PROGRESS NOTES
[facility] Electrophysiology   Reason For Visit:  Atrial Fibrillation     Subjective        EP Problems:  Paroxysmal atrial fibrillation     Cardiology Problems:  1.  Hypertension  2.  Hyperlipidemia     Medical Problems:  1.  Cholecystitis  2.  GERD  3.  Obesity  4. UDAY with CPAP       Sada Casey is a 69 y.o. female with above pertinent PMH who presents for follow-up of PAF.    Last seen in January 2025.  This was following hospitalization for A-fib RVR.  She was maintained on flecainide.  She was on inappropriate dose, this was increased to flecainide 50 mg twice daily.  She was advised to continue the rest of her medicines and follow-up.    Since her previous appointment she has been doing well.  She notices intermittent palpitations and fluttering.  These occur less than weekly.  She is tolerating her blood thinner well with no significant bleeding concerns.  No significant dizziness or lightheadedness.  She has had some hypertensive readings recently that her PCP is adjusting medications for.      ROS: Pertinent findings as noted above        Pertinent past medical, surgical, family, and social history were reviewed.      Current Outpatient Medications:     apixaban (ELIQUIS) 5 MG tablet tablet, Take 1 tablet by mouth Every 12 (Twelve) Hours. Indications: Atrial Fibrillation, Disp: 60 tablet, Rfl: 2    celecoxib (CeleBREX) 200 MG capsule, Take 1 capsule by mouth 2 (Two) Times a Day., Disp: , Rfl:     Cholecalciferol 25 MCG (1000 UT) tablet, Take 1 tablet by mouth Daily., Disp: , Rfl:     Diclofenac Sodium (VOLTAREN) 1 % gel gel, Apply 4 g topically to the appropriate area as directed 4 (Four) Times a Day., Disp: 350 g, Rfl: 0    dicyclomine (BENTYL) 10 MG capsule, Take 1 capsule by mouth 4 (Four) Times a Day Before Meals & at Bedtime., Disp: 360 capsule, Rfl: 3    flecainide (TAMBOCOR) 50 MG tablet, Take 1 tablet by mouth Every 12 (Twelve) Hours., Disp: 180 tablet, Rfl: 3    fluticasone (FLONASE) 50  "MCG/ACT nasal spray, Administer 2 sprays into the nostril(s) as directed by provider As Needed., Disp: , Rfl:     hydrocortisone (ANUSOL-HC) 25 MG suppository, Insert 1 suppository into the rectum At Night As Needed for Hemorrhoids., Disp: 30 suppository, Rfl: 0    irbesartan (AVAPRO) 300 MG tablet, Take 1 tablet by mouth Daily., Disp: , Rfl:     metoprolol succinate XL (TOPROL-XL) 50 MG 24 hr tablet, Take 1 tablet by mouth Daily., Disp: 90 tablet, Rfl: 0    pantoprazole (PROTONIX) 40 MG EC tablet, Take 1 tablet by mouth Daily., Disp: 90 tablet, Rfl: 3    loperamide (Imodium A-D) 2 MG tablet, Take 1 tablet by mouth 4 (Four) Times a Day As Needed for Diarrhea. (Patient not taking: Reported on 4/15/2025), Disp: 120 tablet, Rfl: 12     Objective   Vital Signs:  /76   Pulse 61   Ht 160 cm (62.99\")   Wt 97.1 kg (214 lb)   SpO2 97%   BMI 37.92 kg/m²   Estimated body mass index is 37.92 kg/m² as calculated from the following:    Height as of this encounter: 160 cm (62.99\").    Weight as of this encounter: 97.1 kg (214 lb).      Constitutional:       Appearance: Healthy appearance. Not in distress.   Pulmonary:      Effort: Pulmonary effort is normal.      Breath sounds: Normal breath sounds.   Cardiovascular:      PMI at left midclavicular line. Normal rate. Regular rhythm. Normal S1. Normal S2.       Murmurs: There is no murmur.      No gallop.  No click. No rub.   Pulses:     Intact distal pulses.   Edema:     Peripheral edema absent.   Neurological:      Mental Status: Alert and oriented to person, place and time.        Result Review :  The following data was reviewed by: VINICIO Alcocer on 04/15/2025:  CMP   CMP          12/18/2024    01:28 12/19/2024    03:28 12/20/2024    03:19   CMP   Glucose 93  116  144    BUN 10  15  12    Creatinine 0.69  0.80  0.53    EGFR 93.5  79.4  99.6    Sodium 138  140  137    Potassium 3.3  4.0  4.5    Chloride 103  106  103    Calcium 8.7  8.4  8.7    Total Protein  " 5.9  6.3    Albumin  3.2  3.4    Globulin  2.7  2.9    Total Bilirubin  0.6  0.4    Alkaline Phosphatase  63  86    AST (SGOT)  13  54    ALT (SGPT)  14  42    Albumin/Globulin Ratio  1.2  1.2    BUN/Creatinine Ratio 14.5  18.8  22.6    Anion Gap 11.0  9.0  10.0      CBC   CBC          12/18/2024    01:28 12/19/2024    03:28 12/20/2024    03:19   CBC   WBC 7.16  6.44  9.51    RBC 4.72  4.35  4.55    Hemoglobin 12.5  11.5  12.1    Hematocrit 41.0  38.0  39.2    MCV 86.9  87.4  86.2    MCH 26.5  26.4  26.6    MCHC 30.5  30.3  30.9    RDW 13.5  13.3  13.1    Platelets 214  216  217           ECG 12 Lead    Date/Time: 4/15/2025 10:44 AM  Performed by: Bakari Mercado APRN    Authorized by: Bakari Mercado APRN  Comparison: compared with previous ECG from 1/21/2025  Similar to previous ECG  Comparison to previous ECG: Sinus bradycardia with sinus arrhythmia  Rhythm: sinus bradycardia and sinus arrhythmia  Rate: bradycardic  QRS axis: normal    Clinical impression: normal ECG            Assessment and Plan   Diagnoses and all orders for this visit:    1. PAF (paroxysmal atrial fibrillation) (Primary)  2. Palpitations  -Overall the patient is doing reasonably well today  - Continue Eliquis 5 mg twice daily  - Continue flecainide 50 mg twice daily as well Toprol-XL 50 mg daily  - Continue antihypertensive regimen management per PCP  - Follow-up in the EP clinic in 6 months or sooner if symptoms worsen                     I spent 2 minutes on the separately reported service of EKG interpretation. This time is not included in the time used to support the E/M service also reported today.      Follow Up   Return in about 6 months (around 10/15/2025).  Patient was given instructions and counseling regarding her condition or for health maintenance advice. Please see specific information pulled into the AVS if appropriate.       Part of this note may be an electronic transcription/translation of spoken language to printed text  using the Dragon Dictation System.

## 2025-05-23 NOTE — OP NOTE
sternal border getting the intercostal perforators. This was done using ultrasound guidance. We then injected the  inframammary crease as well as the infraclavicular area. We then went  laterally and injected the interpectoral space under ultrasound guidance  and then again with ultrasound guidance, injected the lateral  intercostal perforators at the lateral edge of the serratus. Once this  was accomplished, we re-prepped, re-draped, and started out with a far  lateral curvilinear incision at the lateral aspect of the breast.  We  dissected down into the subcu and then elevated the breast tissue off of  the lesion. We did get a little close to the lesion and had to  re-excise some of the anterior margin, which fit nicely on the top of  our specimen quite nicely. Once we had elevated our flaps, we then used  the previously placed wire and our ultrasound to fam this area in  ellipse and excised a generous ellipse of breast tissue. This was  excised all the way down the chest wall. We marked cranial, caudal,  medial, and lateral margins and then excised our specimen and then  placed the deep margin as well. Because of the anterior margin, we had  tacked it back down in place with 2-0 silk stitches. We then did  specimen ultrasound. During our dissection, we appeared to have lost  the clip, but you could see the actual clip site in our specimen. We  then irrigated copiously, had good hemostasis. We utilized the  MarginProbe and it was hot on the cranial and the caudal and the medial  and the deep; the lateral and anterior were clear. We then excised  additional portions of the cranial, caudal, medial, and deep margins. We then tested the balloon. It appeared that 90 mL balloon was going to  work quite nicely. We then filled the 90 mL balloon; checked for  symmetry, which was excellent; checked for integrity, which was good. It fit nicely into the wound.   We then placed a 2-0 Prolene pursestring  around the periphery of our cavity. We then inserted the balloon and  tied down our pursestring, and it actually fit quite nicely. We did  ultrasound for _____ skin to balloon distance and it was greater than  1.5 cm on all sides. We were quite pleased with this. She then  underwent uncomplicated intraoperative radiation therapy. Once that was  done, the catheter was removed. We took out our pursestring and then  placed a three-dimensional implant in the tumor bed marking the site for  additional radiation if it should be necessary as well as serving as a  tissue filler as well as marking the tumor site for future mammograms. We sutured this in place with 3-0 Vicryl sutures. We then irrigated  copiously and had good hemostasis. We then rotated our breast  parenchymal flaps into the wound, closing the primary 8 x 10 cm defect. This worked quite nicely and then placed a large round Galen-Gray  drain in the subcu and closed our subcu defect with 2-0 and 3-0 Vicryl  and 4-0 Monocryl for the skin. Sterile dressings were applied. Estimated blood loss, minimal.  Complications, none. She tolerated this  quite well.         Lauri Santos MD    D: 12/30/2019 20:37:14      T: 12/30/2019 23:37:44     [email or web address]  Job#: [id number]     Doc#: [id number]    CC: patient

## 2025-07-15 ENCOUNTER — OFFICE VISIT (OUTPATIENT)
Age: 70
End: 2025-07-15
Payer: MEDICARE

## 2025-07-15 DIAGNOSIS — L84 CALLUS OF TOE: ICD-10-CM

## 2025-07-15 DIAGNOSIS — M79.671 BILATERAL FOOT PAIN: Primary | ICD-10-CM

## 2025-07-15 DIAGNOSIS — E11.9 CONTROLLED TYPE 2 DIABETES MELLITUS WITHOUT COMPLICATION, WITHOUT LONG-TERM CURRENT USE OF INSULIN (HCC): ICD-10-CM

## 2025-07-15 DIAGNOSIS — M79.672 BILATERAL FOOT PAIN: Primary | ICD-10-CM

## 2025-07-15 PROCEDURE — 1159F MED LIST DOCD IN RCRD: CPT | Performed by: NURSE PRACTITIONER

## 2025-07-15 PROCEDURE — 3046F HEMOGLOBIN A1C LEVEL >9.0%: CPT | Performed by: NURSE PRACTITIONER

## 2025-07-15 PROCEDURE — 3017F COLORECTAL CA SCREEN DOC REV: CPT | Performed by: NURSE PRACTITIONER

## 2025-07-15 PROCEDURE — 99204 OFFICE O/P NEW MOD 45 MIN: CPT | Performed by: NURSE PRACTITIONER

## 2025-07-15 PROCEDURE — G8399 PT W/DXA RESULTS DOCUMENT: HCPCS | Performed by: NURSE PRACTITIONER

## 2025-07-15 PROCEDURE — 1036F TOBACCO NON-USER: CPT | Performed by: NURSE PRACTITIONER

## 2025-07-15 PROCEDURE — 1123F ACP DISCUSS/DSCN MKR DOCD: CPT | Performed by: NURSE PRACTITIONER

## 2025-07-15 PROCEDURE — 2022F DILAT RTA XM EVC RTNOPTHY: CPT | Performed by: NURSE PRACTITIONER

## 2025-07-15 PROCEDURE — G8427 DOCREV CUR MEDS BY ELIG CLIN: HCPCS | Performed by: NURSE PRACTITIONER

## 2025-07-15 PROCEDURE — G8417 CALC BMI ABV UP PARAM F/U: HCPCS | Performed by: NURSE PRACTITIONER

## 2025-07-15 PROCEDURE — 1090F PRES/ABSN URINE INCON ASSESS: CPT | Performed by: NURSE PRACTITIONER

## 2025-07-15 RX ORDER — UREA 40 %
CREAM (GRAM) TOPICAL
Qty: 85 G | Refills: 0 | Status: SHIPPED | OUTPATIENT
Start: 2025-07-15

## 2025-07-15 NOTE — PROGRESS NOTES
[facility]  [facility]  [address]  [facility] [id number] [phone]     Patient: Mirian Rodriguez   YOB: 1955   Date: 7/15/2025   Visit Type:      History of Present Illness  Chief Complaint   Patient presents with    Foot Pain     COURTNEY foot pain. Patient states her toes have redness and Lt great toe has a lot of pain that feels like a laceration and not arthritis pain. Rt foot 2nd toe feels numb at the top.        This is a 69 y.o. female presents today complaining of left great toe pain. Redness of both feet. She reports dull pain to both feet when walking for months. She states the right second toe feels numb at times.   She takes Celebrex for arthritis. She states the Diclofenac worked better but states it is hard on her stomach. She has tried topical Diclofenac.   Diabetic.  Unsure of last A1C.  States diabetes is controlled with diet.  She states she has had callus for a very long time.  She gets pedicure and \"they will file it down some\".  She states but they never really file it all off.     Past Medical History:   Diagnosis Date    Allergic rhinitis     Anxiety     BiPAP (biphasic positive airway pressure) dependence     BiPAP ASV 8cm to 15cm with pressure support at 4cm to 6cm and automatic rate    Breast cancer (HCC) 2019    DCIS with IORT    Central sleep apnea     GERD (gastroesophageal reflux disease)     History of therapeutic radiation 2019    IORT    Hx of blood clots 2009    DVT LEFT CALF    Hyperlipidemia     Hypertension     Obstructive sleep apnea     AHI:  116.8 per PSG, 2/2017      Past Surgical History:   Procedure Laterality Date    BREAST BIOPSY Right 2019    DCIS    BREAST LUMPECTOMY      Done by Dr Wheat    BREAST LUMPECTOMY Right 12/30/2019    RIGHT LUMPECTOMY NO SENTINEL NODE, AM OF SURGERY ULTRASOUND AND INTRAOP ULTRASOUND GUIDED NEEDLE LOCALIZATION, BIOZORB, FLAPS, PECTORAL BLOCK, IORT

## 2025-08-15 ENCOUNTER — OFFICE VISIT (OUTPATIENT)
Age: 70
End: 2025-08-15
Payer: MEDICARE

## 2025-08-15 VITALS — HEIGHT: 63 IN | BODY MASS INDEX: 39.69 KG/M2 | WEIGHT: 224 LBS

## 2025-08-15 DIAGNOSIS — G89.29 CHRONIC PAIN OF LEFT KNEE: ICD-10-CM

## 2025-08-15 DIAGNOSIS — M79.604 RIGHT LEG PAIN: ICD-10-CM

## 2025-08-15 DIAGNOSIS — M17.12 PRIMARY OSTEOARTHRITIS OF LEFT KNEE: Primary | ICD-10-CM

## 2025-08-15 DIAGNOSIS — M79.605 LEFT LEG PAIN: ICD-10-CM

## 2025-08-15 DIAGNOSIS — M25.562 CHRONIC PAIN OF LEFT KNEE: ICD-10-CM

## 2025-08-15 PROCEDURE — 1123F ACP DISCUSS/DSCN MKR DOCD: CPT | Performed by: PHYSICIAN ASSISTANT

## 2025-08-15 PROCEDURE — 1160F RVW MEDS BY RX/DR IN RCRD: CPT | Performed by: PHYSICIAN ASSISTANT

## 2025-08-15 PROCEDURE — 99213 OFFICE O/P EST LOW 20 MIN: CPT | Performed by: PHYSICIAN ASSISTANT

## 2025-08-15 PROCEDURE — 1090F PRES/ABSN URINE INCON ASSESS: CPT | Performed by: PHYSICIAN ASSISTANT

## 2025-08-15 PROCEDURE — 3017F COLORECTAL CA SCREEN DOC REV: CPT | Performed by: PHYSICIAN ASSISTANT

## 2025-08-15 PROCEDURE — 1036F TOBACCO NON-USER: CPT | Performed by: PHYSICIAN ASSISTANT

## 2025-08-15 PROCEDURE — G8417 CALC BMI ABV UP PARAM F/U: HCPCS | Performed by: PHYSICIAN ASSISTANT

## 2025-08-15 PROCEDURE — G8399 PT W/DXA RESULTS DOCUMENT: HCPCS | Performed by: PHYSICIAN ASSISTANT

## 2025-08-15 PROCEDURE — 1159F MED LIST DOCD IN RCRD: CPT | Performed by: PHYSICIAN ASSISTANT

## 2025-08-15 PROCEDURE — G8427 DOCREV CUR MEDS BY ELIG CLIN: HCPCS | Performed by: PHYSICIAN ASSISTANT

## 2025-08-15 RX ORDER — LIFITEGRAST 50 MG/ML
SOLUTION/ DROPS OPHTHALMIC
COMMUNITY

## 2025-08-15 RX ORDER — FLUTICASONE PROPIONATE 50 MCG
SPRAY, SUSPENSION (ML) NASAL
COMMUNITY

## 2025-08-15 RX ORDER — CLOBETASOL PROPIONATE 0.5 MG/ML
SOLUTION TOPICAL
COMMUNITY

## 2025-08-15 RX ORDER — CYCLOBENZAPRINE HCL 5 MG
TABLET ORAL
COMMUNITY

## 2025-08-15 RX ORDER — FLUOCINOLONE ACETONIDE 0.11 MG/ML
OIL TOPICAL
COMMUNITY

## 2025-08-15 RX ORDER — CLONIDINE HYDROCHLORIDE 0.1 MG/1
TABLET ORAL
COMMUNITY
Start: 2025-04-02

## 2025-09-02 ENCOUNTER — OFFICE VISIT (OUTPATIENT)
Age: 70
End: 2025-09-02
Payer: MEDICARE

## 2025-09-02 VITALS — BODY MASS INDEX: 40.22 KG/M2 | WEIGHT: 227 LBS | HEIGHT: 63 IN

## 2025-09-02 DIAGNOSIS — Z01.812 PRE-OPERATIVE LABORATORY EXAMINATION: ICD-10-CM

## 2025-09-02 DIAGNOSIS — Z01.818 PRE-OP EVALUATION: ICD-10-CM

## 2025-09-02 DIAGNOSIS — M17.12 PRIMARY OSTEOARTHRITIS OF LEFT KNEE: Primary | ICD-10-CM

## 2025-09-02 DIAGNOSIS — Z01.810 PRE-OPERATIVE CARDIOVASCULAR EXAMINATION: ICD-10-CM

## 2025-09-02 PROCEDURE — G8399 PT W/DXA RESULTS DOCUMENT: HCPCS | Performed by: ORTHOPAEDIC SURGERY

## 2025-09-02 PROCEDURE — 1123F ACP DISCUSS/DSCN MKR DOCD: CPT | Performed by: ORTHOPAEDIC SURGERY

## 2025-09-02 PROCEDURE — 1036F TOBACCO NON-USER: CPT | Performed by: ORTHOPAEDIC SURGERY

## 2025-09-02 PROCEDURE — G8427 DOCREV CUR MEDS BY ELIG CLIN: HCPCS | Performed by: ORTHOPAEDIC SURGERY

## 2025-09-02 PROCEDURE — 99215 OFFICE O/P EST HI 40 MIN: CPT | Performed by: ORTHOPAEDIC SURGERY

## 2025-09-02 PROCEDURE — 1090F PRES/ABSN URINE INCON ASSESS: CPT | Performed by: ORTHOPAEDIC SURGERY

## 2025-09-02 PROCEDURE — 3017F COLORECTAL CA SCREEN DOC REV: CPT | Performed by: ORTHOPAEDIC SURGERY

## 2025-09-02 PROCEDURE — G8417 CALC BMI ABV UP PARAM F/U: HCPCS | Performed by: ORTHOPAEDIC SURGERY

## 2025-09-02 PROCEDURE — 1159F MED LIST DOCD IN RCRD: CPT | Performed by: ORTHOPAEDIC SURGERY

## 2025-09-02 RX ORDER — MUPIROCIN 2 %
OINTMENT (GRAM) TOPICAL
Qty: 1 EACH | Refills: 0 | Status: SHIPPED | OUTPATIENT
Start: 2025-09-02

## 2025-09-02 ASSESSMENT — KOOS JR
GOING UP OR DOWN STAIRS: MODERATE
BENDING TO THE FLOOR TO PICK UP OBJECT: SEVERE
RISING FROM SITTING: MODERATE
HOW SEVERE IS YOUR KNEE STIFFNESS AFTER FIRST WAKING IN MORNING: MODERATE
TWISING OR PIVOTING ON KNEE: MODERATE
KOOS JR TOTAL INTERVAL SCORE: 50.012
STANDING UPRIGHT: MODERATE
STRAIGHTENING KNEE FULLY: MODERATE

## 2025-09-02 ASSESSMENT — PROMIS GLOBAL HEALTH SCALE
IN GENERAL, WOULD YOU SAY YOUR QUALITY OF LIFE IS...[ON A SCALE OF 1 (POOR) TO 5 (EXCELLENT)]: EXCELLENT
IN GENERAL, HOW WOULD YOU RATE YOUR MENTAL HEALTH, INCLUDING YOUR MOOD AND YOUR ABILITY TO THINK [ON A SCALE OF 1 (POOR) TO 5 (EXCELLENT)]?: VERY GOOD
WHO IS THE PERSON COMPLETING THE PROMIS V1.1 SURVEY?: SELF
IN THE PAST 7 DAYS, HOW WOULD YOU RATE YOUR FATIGUE ON AVERAGE [ON A SCALE FROM 1 (NONE) TO 5 (VERY SEVERE)]?: MILD
SUM OF RESPONSES TO QUESTIONS 2, 4, 5, & 10: 16
HOW IS THE PROMIS V1.1 BEING ADMINISTERED?: PAPER
IN GENERAL, HOW WOULD YOU RATE YOUR PHYSICAL HEALTH [ON A SCALE OF 1 (POOR) TO 5 (EXCELLENT)]?: GOOD
IN GENERAL, WOULD YOU SAY YOUR HEALTH IS...[ON A SCALE OF 1 (POOR) TO 5 (EXCELLENT)]: GOOD
IN THE PAST 7 DAYS, HOW WOULD YOU RATE YOUR PAIN ON AVERAGE [ON A SCALE FROM 0 (NO PAIN) TO 10 (WORST IMAGINABLE PAIN)]?: 2
IN THE PAST 7 DAYS, HOW OFTEN HAVE YOU BEEN BOTHERED BY EMOTIONAL PROBLEMS, SUCH AS FEELING ANXIOUS, DEPRESSED, OR IRRITABLE [ON A SCALE FROM 1 (NEVER) TO 5 (ALWAYS)]?: RARELY
SUM OF RESPONSES TO QUESTIONS 3, 6, 7, & 8: 12
IN GENERAL, PLEASE RATE HOW WELL YOU CARRY OUT YOUR USUAL SOCIAL ACTIVITIES (INCLUDES ACTIVITIES AT HOME, AT WORK, AND IN YOUR COMMUNITY, AND RESPONSIBILITIES AS A PARENT, CHILD, SPOUSE, EMPLOYEE, FRIEND, ETC) [ON A SCALE OF 1 (POOR) TO 5 (EXCELLENT)]?: VERY GOOD
IN GENERAL, HOW WOULD YOU RATE YOUR SATISFACTION WITH YOUR SOCIAL ACTIVITIES AND RELATIONSHIPS [ON A SCALE OF 1 (POOR) TO 5 (EXCELLENT)]?: GOOD
TO WHAT EXTENT ARE YOU ABLE TO CARRY OUT YOUR EVERYDAY PHYSICAL ACTIVITIES SUCH AS WALKING, CLIMBING STAIRS, CARRYING GROCERIES, OR MOVING A CHAIR [ON A SCALE OF 1 (NOT AT ALL) TO 5 (COMPLETELY)]?: MODERATELY

## (undated) DEVICE — GOWN,PREVENTION PLUS,XL,ST,24/CS: Brand: MEDLINE

## (undated) DEVICE — DRAIN JACKSON PRATT ROUND 15FR: Brand: CARDINAL HEALTH

## (undated) DEVICE — THE CHANNEL CLEANING BRUSH IS A NYLON FLEXI BRUSH ATTACHED TO A FLEXIBLE PLASTIC SHEATH DESIGNED TO SAFELY REMOVE DEBRIS FROM FLEXIBLE ENDOSCOPES.

## (undated) DEVICE — MINOR CDS: Brand: MEDLINE INDUSTRIES, INC.

## (undated) DEVICE — PROBE DTECT MARGIN F/LUMPECTOMY

## (undated) DEVICE — STRIP,CLOSURE,WOUND,MEDI-STRIP,1/2X4: Brand: MEDLINE

## (undated) DEVICE — CATHETER URETH 24FR 30CC BLLN SILAS F 2 W SPEC M RND TIP

## (undated) DEVICE — COVER,MAYO STAND,STERILE: Brand: MEDLINE

## (undated) DEVICE — Device: Brand: DEFENDO AIR/WATER/SUCTION AND BIOPSY VALVE

## (undated) DEVICE — ENDOGATOR AUXILIARY WATER JET CONNECTOR: Brand: ENDOGATOR

## (undated) DEVICE — SUTURE MNCRYL STRATAFIX PS 4-0 30CM

## (undated) DEVICE — Device

## (undated) DEVICE — PDS II VLT 0 107CM AG ST3: Brand: ENDOLOOP

## (undated) DEVICE — TBG SMPL FLTR LINE NASL 02/C02 A/ BX/100

## (undated) DEVICE — FRCP BX RADJAW4 NDL 2.8 240 STD OG

## (undated) DEVICE — GLV SURG SENSICARE W/ALOE PF LF 7.5 STRL

## (undated) DEVICE — FOR PERCUTANEOUS INSERTION, COMBO PACK INCLUDES:(PMIORCA20) DISPOSABLE OPERATIVE CHOLANGIOGRAM CATHETER 20" & (PIN-15) DISPOSABLE PERCUTANEOUS INTRODUCER 4" LENGTH AND 14 GAUGE: Brand: PMI

## (undated) DEVICE — CONMED SCOPE SAVER BITE BLOCK, 20X27 MM: Brand: SCOPE SAVER

## (undated) DEVICE — CUFF,BP,DISP,1 TUBE,ADULT,HP: Brand: MEDLINE

## (undated) DEVICE — YANKAUER,BULB TIP WITH VENT: Brand: ARGYLE

## (undated) DEVICE — SUTURE VCRL SZ 2-0 L36IN ABSRB UD L36MM CT-1 1/2 CIR J945H

## (undated) DEVICE — SYR LUERLOK 50ML

## (undated) DEVICE — DECANTER: Brand: UNBRANDED

## (undated) DEVICE — LARYNGOSCOPE BLDE MAC HNDL M SZ 35 ST CURAPLEX CURAVIEW LED

## (undated) DEVICE — CLIP INT M L GRN TI TRNSVRS GRV CHEVRON SHP W/ PRECIS TIP

## (undated) DEVICE — MASK,OXYGEN,MED CONC,ADLT,7' TUB, UC: Brand: PENDING

## (undated) DEVICE — DAVINCI: Brand: MEDLINE INDUSTRIES, INC.

## (undated) DEVICE — ELECTRD BLD EZ CLN MOD XLNG 2.75IN

## (undated) DEVICE — GOWN,PREVENTION PLUS,2XL,ST,22/CS: Brand: MEDLINE

## (undated) DEVICE — DRSNG WND SIL OPTIFOAM GENTLE BRDR ADHS W/SA 4X4IN

## (undated) DEVICE — ANTIBACTERIAL UNDYED BRAIDED (POLYGLACTIN 910), SYNTHETIC ABSORBABLE SUTURE: Brand: COATED VICRYL

## (undated) DEVICE — AEGIS 1" DISK 4MM HOLE, PEEL OPEN: Brand: MEDLINE

## (undated) DEVICE — SYR LUERLOK 30CC

## (undated) DEVICE — 3M™ IOBAN™ 2 ANTIMICROBIAL INCISE DRAPE 6650EZ: Brand: IOBAN™ 2

## (undated) DEVICE — SUTURE VCRL SZ 3-0 L27IN ABSRB UD L26MM SH 1/2 CIR J416H

## (undated) DEVICE — PACK,UNIVERSAL,NO GOWNS: Brand: MEDLINE

## (undated) DEVICE — TOWEL,OR,DSP,ST,BLUE,DLX,4/PK,20PK/CS: Brand: MEDLINE

## (undated) DEVICE — SENSR O2 OXIMAX FNGR A/ 18IN NONSTR

## (undated) DEVICE — RESERVOIR,SUCTION,100CC,SILICONE: Brand: MEDLINE

## (undated) DEVICE — PATIENT RETURN ELECTRODE, SINGLE-USE, CONTACT QUALITY MONITORING, ADULT, WITH 9FT CORD, FOR PATIENTS WEIGING OVER 33LBS. (15KG): Brand: MEGADYNE

## (undated) DEVICE — MONOPOLAR METZENBAUM SCISSOR, MINI BLADE TIP, DISPOSABLE: Brand: MONOPOLAR METZENBAUM SCISSOR, MINI BLADE TIP, DISPOSABLE

## (undated) DEVICE — KT CLN CLEANOR SCPE

## (undated) DEVICE — ENDOPATH XCEL BLADELESS TROCARS WITH STABILITY SLEEVES: Brand: ENDOPATH XCEL

## (undated) DEVICE — SUTURE PERMAHAND SZ 2-0 L18IN NONABSORBABLE BLK L26MM SH C012D

## (undated) DEVICE — SPECIMEN ORIENTATION CHARMS, SIX DISTINCTLY SHAPED STERILE 10MM CHARMS: Brand: MARGINMAP

## (undated) DEVICE — SOLUTION IV IRRIG WATER 1000ML POUR BRL 2F7114

## (undated) DEVICE — STANDARD HYPODERMIC NEEDLE,POLYPROPYLENE HUB: Brand: MONOJECT

## (undated) DEVICE — GLV SURG TRIUMPH ORTHO W/ALOE PF LTX 8 STRL

## (undated) DEVICE — ADHS LIQ MASTISOL 2/3ML

## (undated) DEVICE — 3 ML SYRINGE WITH HYPODERMIC SAFETY NEEDLE: Brand: MAGELLAN

## (undated) DEVICE — DRESSING GRMCDL 6 12FR D1N CNTR HOLE 4MM ANTMCRBL PRTCTVE DI

## (undated) DEVICE — GLOVE SURG SZ 75 CRM LTX FREE POLYISOPRENE POLYMER BEAD ANTI

## (undated) DEVICE — SUTURE ETHLN SZ 2-0 L30IN NONABSORBABLE BLK L36MM FSLX 3/8 1674H

## (undated) DEVICE — SUT ETHLN 2/0 FS 18IN 664H

## (undated) DEVICE — SYR LL TP 10ML STRL

## (undated) DEVICE — SUT VIC 0 SUTUPAK TIES 18IN J906G

## (undated) DEVICE — COVER US PRB W5XL96IN LTX W/ GEL

## (undated) DEVICE — SEAL

## (undated) DEVICE — SYR LUERLOK 20CC BX/50

## (undated) DEVICE — STPCK 4WY ON/OFF VLV M/COLAR FIT 45PSI STRL

## (undated) DEVICE — TISSUE RETRIEVAL SYSTEM: Brand: INZII RETRIEVAL SYSTEM

## (undated) DEVICE — NDL HYPO PRECISIONGLIDE REG 22G 1 1/2

## (undated) DEVICE — GAUZE,SPONGE,FLUFF,6"X6.75",STRL,10/TRAY: Brand: MEDLINE

## (undated) DEVICE — SYRINGE IRRIG 60ML SFT PLIABLE BLB EZ TO GRP 1 HND USE W/

## (undated) DEVICE — JACKSON-PRATT 100CC BULB RESERVOIR: Brand: CARDINAL HEALTH

## (undated) DEVICE — PACKAGE ASSY BALLOON POUCH ST 5-6CM

## (undated) DEVICE — SUTURE VCRL SZ 3-0 L36IN ABSRB UD L36MM CT-1 1/2 CIR J944H

## (undated) DEVICE — PEN: MARKING STD 100/CS: Brand: MEDICAL ACTION INDUSTRIES

## (undated) DEVICE — BLADELESS OBTURATOR: Brand: WECK VISTA

## (undated) DEVICE — TBG INSUFFLATION LUER LOCK: Brand: MEDLINE INDUSTRIES, INC.

## (undated) DEVICE — ARM DRAPE

## (undated) DEVICE — ST TB EXT STANDARDBORE 30IN

## (undated) DEVICE — NDL FLTR BLNT 18G 1 1/2IN

## (undated) DEVICE — APPL CHLORAPREP W/TINT 26ML ORNG

## (undated) DEVICE — PAD,NON-ADHERENT,3X8,STERILE,LF,1/PK: Brand: MEDLINE

## (undated) DEVICE — GLOVE SURG SZ 85 L12IN FNGR ORTHO 126MIL CRM LTX FREE